# Patient Record
Sex: MALE | Race: WHITE | NOT HISPANIC OR LATINO | Employment: PART TIME | URBAN - METROPOLITAN AREA
[De-identification: names, ages, dates, MRNs, and addresses within clinical notes are randomized per-mention and may not be internally consistent; named-entity substitution may affect disease eponyms.]

---

## 2017-01-04 ENCOUNTER — GENERIC CONVERSION - ENCOUNTER (OUTPATIENT)
Dept: OTHER | Facility: OTHER | Age: 52
End: 2017-01-04

## 2017-02-16 ENCOUNTER — GENERIC CONVERSION - ENCOUNTER (OUTPATIENT)
Dept: OTHER | Facility: OTHER | Age: 52
End: 2017-02-16

## 2017-02-19 ENCOUNTER — ALLSCRIPTS OFFICE VISIT (OUTPATIENT)
Dept: OTHER | Facility: OTHER | Age: 52
End: 2017-02-19

## 2017-02-23 ENCOUNTER — ALLSCRIPTS OFFICE VISIT (OUTPATIENT)
Dept: OTHER | Facility: OTHER | Age: 52
End: 2017-02-23

## 2017-03-22 ENCOUNTER — TRANSCRIBE ORDERS (OUTPATIENT)
Dept: ADMINISTRATIVE | Facility: HOSPITAL | Age: 52
End: 2017-03-22

## 2017-03-22 ENCOUNTER — HOSPITAL ENCOUNTER (OUTPATIENT)
Dept: RADIOLOGY | Facility: MEDICAL CENTER | Age: 52
Discharge: HOME | End: 2017-03-22
Payer: COMMERCIAL

## 2017-03-22 DIAGNOSIS — R06.09 OTHER FORMS OF DYSPNEA: ICD-10-CM

## 2017-03-22 PROCEDURE — 71020 HB CHEST X-RAY 2VW FRONTAL&LATL: CPT

## 2017-03-24 ENCOUNTER — ALLSCRIPTS OFFICE VISIT (OUTPATIENT)
Dept: OTHER | Facility: OTHER | Age: 52
End: 2017-03-24

## 2017-03-24 ENCOUNTER — GENERIC CONVERSION - ENCOUNTER (OUTPATIENT)
Dept: OTHER | Facility: OTHER | Age: 52
End: 2017-03-24

## 2017-07-17 ENCOUNTER — ALLSCRIPTS OFFICE VISIT (OUTPATIENT)
Dept: OTHER | Facility: OTHER | Age: 52
End: 2017-07-17

## 2017-07-26 ENCOUNTER — ALLSCRIPTS OFFICE VISIT (OUTPATIENT)
Dept: OTHER | Facility: OTHER | Age: 52
End: 2017-07-26

## 2017-08-09 ENCOUNTER — GENERIC CONVERSION - ENCOUNTER (OUTPATIENT)
Dept: OTHER | Facility: OTHER | Age: 52
End: 2017-08-09

## 2017-08-09 ENCOUNTER — ALLSCRIPTS OFFICE VISIT (OUTPATIENT)
Dept: OTHER | Facility: OTHER | Age: 52
End: 2017-08-09

## 2017-10-27 DIAGNOSIS — Z12.11 ENCOUNTER FOR SCREENING FOR MALIGNANT NEOPLASM OF COLON: ICD-10-CM

## 2017-10-27 DIAGNOSIS — Z12.5 ENCOUNTER FOR SCREENING FOR MALIGNANT NEOPLASM OF PROSTATE: ICD-10-CM

## 2017-10-27 DIAGNOSIS — Z11.59 ENCOUNTER FOR SCREENING FOR OTHER VIRAL DISEASES (CODE): ICD-10-CM

## 2017-10-27 DIAGNOSIS — Z13.1 ENCOUNTER FOR SCREENING FOR DIABETES MELLITUS: ICD-10-CM

## 2017-10-27 DIAGNOSIS — Z13.220 ENCOUNTER FOR SCREENING FOR LIPOID DISORDERS: ICD-10-CM

## 2017-10-27 DIAGNOSIS — Z13.29 ENCOUNTER FOR SCREENING FOR OTHER SUSPECTED ENDOCRINE DISORDER: ICD-10-CM

## 2017-10-27 DIAGNOSIS — J45.990 EXERCISE-INDUCED BRONCHOSPASM: ICD-10-CM

## 2017-10-27 DIAGNOSIS — Z13.0 ENCOUNTER FOR SCREENING FOR DISEASES OF THE BLOOD AND BLOOD-FORMING ORGANS AND CERTAIN DISORDERS INVOLVING THE IMMUNE MECHANISM: ICD-10-CM

## 2017-11-06 ENCOUNTER — TRANSCRIBE ORDERS (OUTPATIENT)
Dept: ADMINISTRATIVE | Facility: HOSPITAL | Age: 52
End: 2017-11-06

## 2017-11-06 ENCOUNTER — APPOINTMENT (OUTPATIENT)
Dept: LAB | Facility: MEDICAL CENTER | Age: 52
End: 2017-11-06
Payer: COMMERCIAL

## 2017-11-06 DIAGNOSIS — J40 BRONCHITIS: ICD-10-CM

## 2017-11-06 DIAGNOSIS — R06.02 SOB (SHORTNESS OF BREATH): ICD-10-CM

## 2017-11-06 DIAGNOSIS — Z12.11 ENCOUNTER FOR SCREENING FOR MALIGNANT NEOPLASM OF COLON: ICD-10-CM

## 2017-11-06 DIAGNOSIS — Z11.59 ENCOUNTER FOR SCREENING FOR OTHER VIRAL DISEASES (CODE): ICD-10-CM

## 2017-11-06 DIAGNOSIS — Z12.5 ENCOUNTER FOR SCREENING FOR MALIGNANT NEOPLASM OF PROSTATE: ICD-10-CM

## 2017-11-06 DIAGNOSIS — J45.990 EXERCISE-INDUCED BRONCHOSPASM: ICD-10-CM

## 2017-11-06 DIAGNOSIS — R05.9 COUGH: ICD-10-CM

## 2017-11-06 DIAGNOSIS — R05.9 COUGH: Primary | ICD-10-CM

## 2017-11-06 DIAGNOSIS — Z13.29 ENCOUNTER FOR SCREENING FOR OTHER SUSPECTED ENDOCRINE DISORDER: ICD-10-CM

## 2017-11-06 DIAGNOSIS — Z13.1 ENCOUNTER FOR SCREENING FOR DIABETES MELLITUS: ICD-10-CM

## 2017-11-06 DIAGNOSIS — Z13.0 ENCOUNTER FOR SCREENING FOR DISEASES OF THE BLOOD AND BLOOD-FORMING ORGANS AND CERTAIN DISORDERS INVOLVING THE IMMUNE MECHANISM: ICD-10-CM

## 2017-11-06 DIAGNOSIS — Z13.220 ENCOUNTER FOR SCREENING FOR LIPOID DISORDERS: ICD-10-CM

## 2017-11-06 LAB
ALBUMIN SERPL BCP-MCNC: 4.3 G/DL (ref 3.5–5)
ALP SERPL-CCNC: 92 U/L (ref 46–116)
ALT SERPL W P-5'-P-CCNC: 48 U/L (ref 12–78)
ANION GAP SERPL CALCULATED.3IONS-SCNC: 5 MMOL/L (ref 4–13)
AST SERPL W P-5'-P-CCNC: 24 U/L (ref 5–45)
BASOPHILS # BLD AUTO: 0.03 THOUSANDS/ΜL (ref 0–0.1)
BASOPHILS NFR BLD AUTO: 1 % (ref 0–1)
BILIRUB SERPL-MCNC: 0.64 MG/DL (ref 0.2–1)
BUN SERPL-MCNC: 13 MG/DL (ref 5–25)
CALCIUM SERPL-MCNC: 9.4 MG/DL (ref 8.3–10.1)
CHLORIDE SERPL-SCNC: 103 MMOL/L (ref 100–108)
CHOLEST SERPL-MCNC: 189 MG/DL (ref 50–200)
CO2 SERPL-SCNC: 30 MMOL/L (ref 21–32)
CREAT SERPL-MCNC: 1.11 MG/DL (ref 0.6–1.3)
EOSINOPHIL # BLD AUTO: 0.22 THOUSAND/ΜL (ref 0–0.61)
EOSINOPHIL NFR BLD AUTO: 4 % (ref 0–6)
ERYTHROCYTE [DISTWIDTH] IN BLOOD BY AUTOMATED COUNT: 12.6 % (ref 11.6–15.1)
GFR SERPL CREATININE-BSD FRML MDRD: 76 ML/MIN/1.73SQ M
GLUCOSE P FAST SERPL-MCNC: 101 MG/DL (ref 65–99)
HCT VFR BLD AUTO: 48.8 % (ref 36.5–49.3)
HDLC SERPL-MCNC: 60 MG/DL (ref 40–60)
HGB BLD-MCNC: 17.1 G/DL (ref 12–17)
IGA SERPL-MCNC: 324 MG/DL (ref 70–400)
IGG SERPL-MCNC: 957 MG/DL (ref 700–1600)
IGM SERPL-MCNC: 102 MG/DL (ref 40–230)
LDLC SERPL CALC-MCNC: 112 MG/DL (ref 0–100)
LYMPHOCYTES # BLD AUTO: 1.57 THOUSANDS/ΜL (ref 0.6–4.47)
LYMPHOCYTES NFR BLD AUTO: 28 % (ref 14–44)
MCH RBC QN AUTO: 32 PG (ref 26.8–34.3)
MCHC RBC AUTO-ENTMCNC: 35 G/DL (ref 31.4–37.4)
MCV RBC AUTO: 91 FL (ref 82–98)
MONOCYTES # BLD AUTO: 0.61 THOUSAND/ΜL (ref 0.17–1.22)
MONOCYTES NFR BLD AUTO: 11 % (ref 4–12)
NEUTROPHILS # BLD AUTO: 3.21 THOUSANDS/ΜL (ref 1.85–7.62)
NEUTS SEG NFR BLD AUTO: 56 % (ref 43–75)
NRBC BLD AUTO-RTO: 0 /100 WBCS
PLATELET # BLD AUTO: 212 THOUSANDS/UL (ref 149–390)
PMV BLD AUTO: 10.1 FL (ref 8.9–12.7)
POTASSIUM SERPL-SCNC: 4.5 MMOL/L (ref 3.5–5.3)
PROT SERPL-MCNC: 8 G/DL (ref 6.4–8.2)
RBC # BLD AUTO: 5.34 MILLION/UL (ref 3.88–5.62)
SODIUM SERPL-SCNC: 138 MMOL/L (ref 136–145)
TRIGL SERPL-MCNC: 83 MG/DL
TSH SERPL DL<=0.05 MIU/L-ACNC: 3.55 UIU/ML (ref 0.36–3.74)
WBC # BLD AUTO: 5.65 THOUSAND/UL (ref 4.31–10.16)

## 2017-11-06 PROCEDURE — 86803 HEPATITIS C AB TEST: CPT

## 2017-11-06 PROCEDURE — 80061 LIPID PANEL: CPT

## 2017-11-06 PROCEDURE — 85025 COMPLETE CBC W/AUTO DIFF WBC: CPT

## 2017-11-06 PROCEDURE — 80053 COMPREHEN METABOLIC PANEL: CPT

## 2017-11-06 PROCEDURE — 82784 ASSAY IGA/IGD/IGG/IGM EACH: CPT

## 2017-11-06 PROCEDURE — 36415 COLL VENOUS BLD VENIPUNCTURE: CPT

## 2017-11-06 PROCEDURE — 84443 ASSAY THYROID STIM HORMONE: CPT

## 2017-11-07 LAB — HCV AB SER QL: NORMAL

## 2017-11-15 ENCOUNTER — ALLSCRIPTS OFFICE VISIT (OUTPATIENT)
Dept: OTHER | Facility: OTHER | Age: 52
End: 2017-11-15

## 2017-11-22 ENCOUNTER — GENERIC CONVERSION - ENCOUNTER (OUTPATIENT)
Dept: OTHER | Facility: OTHER | Age: 52
End: 2017-11-22

## 2017-12-15 ENCOUNTER — GENERIC CONVERSION - ENCOUNTER (OUTPATIENT)
Dept: FAMILY MEDICINE CLINIC | Facility: CLINIC | Age: 52
End: 2017-12-15

## 2018-01-10 NOTE — RESULT NOTES
Verified Results  (1) TESTOSTERONE, FREE (DIRECT) AND TOTAL [id number] 12:00AM Chidi Sanchez     Test Name Result Flag Reference   Testosterone, Total, LC/ 2 ng/dL  348 0-1197 0   Free Testosterone(Direct) 10 4 pg/mL  7 2-24 0

## 2018-01-11 NOTE — PROGRESS NOTES
Assessment    1  Encounter for preventive health examination (V70 0) (Z00 00)    Discussion/Summary  health maintenance visit eats an adequate diet Currently, he has an adequate exercise regimen  Prostate cancer screening: prostate cancer screening is current  Testicular cancer screening: monthly self testicular exam was advised  Colorectal cancer screening: colorectal cancer screening is current  The immunizations are up to date  Advice and education were given regarding nutrition, cardiovascular risk reduction, weight bearing exercise and weight loss  Patient discussion: discussed with the patient  The patient agrees to Hepatitis C screening  Patient's physical exam is fairly unremarkable  He is struggling a bit with his shortness of breath which is unpredictable and intermittent  He is using his inhalers as recommended by his pulmonologist and will be returning there shortly to review the results of his pulmonary function testing  I reviewed with him his routine blood work today  His total cholesterol was good at 189 with an LDL of 112  Interestingly his HDL has risen significantly over the last 6 months likely due to his significant increase in exercise during that time  The rest of his blood work is unremarkable  Will see him again in 6 months for routine follow-up  Chief Complaint  Patient presents for annual physical  Patient also has a form to get filled out  History of Present Illness  , Adult Male: The patient is being seen for a health maintenance evaluation  General Health: The patient's health since the last visit is described as good  He has regular dental visits  He complains of vision problems  Vision care includes wearing glasses  He denies hearing loss  Lifestyle:  He consumes a diverse and healthy diet  He has weight concerns  He exercises regularly  He does not use tobacco  He consumes alcohol  He reports occasional alcohol use     Reproductive health:  the patient is sexually active  Screening: cancer screening reviewed and current  metabolic screening reviewed and current  risk screening reviewed and current  HPI: Patient presents for annual physical exam  He feels overall that he is struggling with his health a bit currently  He has a problem with chronic cough chest congestion with shortness of breath however cardiac workup and pulmonary workup have been somewhat unremarkable  He continues to see Pulmonary Medicine and has an upcoming appointment to review some recent testing and pulmonary function evaluation  He also has some chronic gastrointestinal issues since his bouts of diverticulitis with a partial colon resection  He is struggling to lose weight despite the fact that he is dieting and working out regularly at the gym with a   Review of Systems    Constitutional: feeling tired  Eyes: No complaints of eye pain, no red eyes, no discharge from eyes, no itchy eyes  ENT: no complaints of earache, no hearing loss, no nosebleeds, no nasal discharge, no sore throat, no hoarseness  Cardiovascular: No complaints of slow heart rate, no fast heart rate, no chest pain, no palpitations, no leg claudication, no lower extremity  Respiratory: cough and shortness of breath during exertion  Genitourinary: No complaints of dysuria, no incontinence, no hesitancy, no nocturia, no genital lesion, no testicular pain  Musculoskeletal: No complaints of arthralgia, no myalgias, no joint swelling or stiffness, no limb pain or swelling  Integumentary: No complaints of skin rash or skin lesions, no itching, no skin wound, no dry skin  Neurological: No compliants of headache, no confusion, no convulsions, no numbness or tingling, no dizziness or fainting, no limb weakness, no difficulty walking  Psychiatric: Is not suicidal, no sleep disturbances, no anxiety or depression, no change in personality, no emotional problems     Endocrine: No complaints of proptosis, no hot flashes, no muscle weakness, no erectile dysfunction, no deepening of the voice, no feelings of weakness  Hematologic/Lymphatic: No complaints of swollen glands, no swollen glands in the neck, does not bleed easily, no easy bruising  Over the past 2 weeks, how often have you been bothered by the following problems? 1 ) Little interest or pleasure in doing things? Not at all    2 ) Feeling down, depressed or hopeless? Not at all    3 ) Trouble falling asleep or sleeping too much? Several days  4 ) Feeling tired or having little energy? Several days  5 ) Poor appetite or overeating? Not at all    6 ) Feeling bad about yourself, or that you are a failure, or have let yourself or your family down? Not at all    7 ) Trouble concentrating on things, such as reading a newspaper or watching television? Not at all    8 ) Moving or speaking so slowly that other people could have noticed, or the opposite, moving or speaking faster than usual? Not at all  How difficult have these problems made it for you to do your work, take care of things at home, or get along with people? Not at all  Score 2      Active Problems    1  Acute bronchitis, unspecified organism (466 0) (J20 9)   2  Diverticulitis of colon (562 11) (K57 32)   3  Dyspnea on exertion (786 09) (R06 09)   4  Exercise-induced bronchospasm (493 81) (J45 990)   5  Herpes simplex type 1 infection (054 9) (B00 9)   6  Need for hepatitis C screening test (V73 89) (Z11 59)   7  Prostate cancer screening (V76 44) (Z12 5)   8  Screening for colon cancer (V76 51) (Z12 11)   9  Screening for deficiency anemia (V78 1) (Z13 0)   10  Screening for diabetes mellitus (DM) (V77 1) (Z13 1)   11  Screening for lipid disorders (V77 91) (Z13 220)   12   Screening for thyroid disorder (V77 0) (Z13 29)    Past Medical History    · History of Herpes infection (054 9) (B00 9)   · History of Plantar fasciitis (838 71) (M72 2)   · History of Proctitis (569 49) (K62 89)   · History of Rectal fissure (565 0) (K60 2)    Surgical History    · History of Elbow Surgery   · History of Hand Surgery   · History of Knee Arthroscopy (Therapeutic)   · History of Myringotomy   · History of Pyloromyotomy   · History of Septoplasty   · History of Septoplasty    Family History  Mother    · Family history of Breast Cancer (V16 3)  Father    · Family history of Angina Pectoris   · Family history of Reported Family History Of Heart Disease  Paternal Grandmother    · Family history of Brain Tumor  Maternal Grandfather    · Family history of Lung Cancer (V16 1)    Social History    · Never A Smoker    Current Meds   1  Acyclovir 5 % External Ointment; APPLY TO AFFECTED AREA UP TO 4 TIMES DAILY; Therapy: 90GEH3206 to (Last Rx:[id number])  Requested for: 61OMX5222 Ordered   2  Advair Diskus 500-50 MCG/DOSE Inhalation Aerosol Powder Breath Activated; Therapy: 76OYL3423 to Recorded   3  Albuterol Sulfate (2 5 MG/3ML) 0 083% Inhalation Nebulization Solution; USE 1 UNIT   DOSE EVERY 4-6 HOURS AS NEEDED FOR WHEEZING ; Therapy: 83WOX1112 to Recorded   4  Aspirin 81 MG CAPS; take 1 capsule daily; Therapy: (Recorded:24Mar2017) to Recorded   5  Fish Oil 1200 MG Oral Capsule; take 1 capsule daily; Therapy: (Recorded:24Mar2017) to Recorded   6  ProAir  (90 Base) MCG/ACT Inhalation Aerosol Solution; INHALE 2 PUFFS   Every 6 hours PRN; Therapy: 72EQB7294 to (Last Rx:[id number])  Requested for: 15IUH9458 Ordered   7  Singulair 10 MG Oral Tablet; take 1 tablet by mouth every day; Therapy: 93WHE5542 to Recorded   8  Vitamin D3 1000 UNIT Oral Tablet; TAKE 1 TABLET DAILY; Therapy: (Recorded:24Mar2017) to Recorded    Allergies    1   No Known Drug Allergies    Vitals   Recorded: 61NTF1585 01:38PM   Temperature 99 9 F, Tympanic   Heart Rate 104   Pulse Quality Normal   Respiration Quality Normal   Respiration 16   Systolic 662, LUE, Sitting   Diastolic 88, LUE, Sitting   Height 6 ft 3 in Weight 239 lb 6 oz   BMI Calculated 29 92   BSA Calculated 2 37   O2 Saturation 97, RA   Pain Scale 0     Physical Exam    Constitutional   General appearance: No acute distress, well appearing and well nourished  Head and Face   Head and face: Normal     Eyes   Conjunctiva and lids: No erythema, swelling or discharge  Pupils and irises: Equal, round, reactive to light  Ears, Nose, Mouth, and Throat   External inspection of ears and nose: Normal     Otoscopic examination: Tympanic membranes translucent with normal light reflex  Canals patent without erythema  Hearing: Normal     Nasal mucosa, septum, and turbinates: Normal without edema or erythema  Lips, teeth, and gums: Normal, good dentition  Oropharynx: Normal with no erythema, edema, exudate or lesions  Neck   Neck: Supple, symmetric, trachea midline, no masses  Thyroid: Normal, no thyromegaly  Pulmonary   Respiratory effort: No increased work of breathing or signs of respiratory distress  Auscultation of lungs: Clear to auscultation  Cardiovascular   Auscultation of heart: Normal rate and rhythm, normal S1 and S2, no murmurs  Carotid pulses: 2+ bilaterally  Femoral pulses: 2+ bilaterally  Examination of extremities for edema and/or varicosities: Normal     Abdomen   Abdomen: Non-tender, no masses  Liver and spleen: No hepatomegaly or splenomegaly  Lymphatic   Palpation of lymph nodes in neck: No lymphadenopathy  Musculoskeletal   Gait and station: Normal     Inspection/palpation of digits and nails: Normal without clubbing or cyanosis  Inspection/palpation of joints, bones, and muscles: Normal     Range of motion: Normal     Stability: Normal     Muscle strength/tone: Normal     Skin   Skin and subcutaneous tissue: Normal without rashes or lesions  Neurologic   Cranial nerves: Cranial nerves 2-12 intact  Cortical function: Normal mental status  Reflexes: 2+ and symmetric      Sensation: No sensory loss     Coordination: Normal finger to nose and heel to shin  Psychiatric   Judgment and insight: Normal     Orientation to person, place and time: Normal     Recent and remote memory: Intact  Mood and affect: Normal        Health Management  History of Encounter for screening colonoscopy   COLONOSCOPY; every 5 years; Last 89ONZ9805; Next Due: 15FJD0005;  Active    Signatures   Electronically signed by : Maria De Jesus St DO; Nov 15 2017  4:26PM EST                       (Author)

## 2018-01-12 VITALS
DIASTOLIC BLOOD PRESSURE: 84 MMHG | HEART RATE: 78 BPM | SYSTOLIC BLOOD PRESSURE: 130 MMHG | WEIGHT: 237.38 LBS | BODY MASS INDEX: 29.51 KG/M2 | RESPIRATION RATE: 16 BRPM | TEMPERATURE: 97.9 F | HEIGHT: 75 IN | OXYGEN SATURATION: 98 %

## 2018-01-12 VITALS
WEIGHT: 242 LBS | HEART RATE: 84 BPM | SYSTOLIC BLOOD PRESSURE: 136 MMHG | DIASTOLIC BLOOD PRESSURE: 84 MMHG | TEMPERATURE: 98.8 F | HEIGHT: 75 IN | BODY MASS INDEX: 30.09 KG/M2 | OXYGEN SATURATION: 98 % | RESPIRATION RATE: 18 BRPM

## 2018-01-13 VITALS
SYSTOLIC BLOOD PRESSURE: 112 MMHG | HEART RATE: 108 BPM | OXYGEN SATURATION: 96 % | TEMPERATURE: 98.5 F | BODY MASS INDEX: 29.65 KG/M2 | RESPIRATION RATE: 18 BRPM | HEIGHT: 75 IN | DIASTOLIC BLOOD PRESSURE: 78 MMHG | WEIGHT: 238.5 LBS

## 2018-01-13 VITALS
TEMPERATURE: 99.9 F | DIASTOLIC BLOOD PRESSURE: 88 MMHG | HEIGHT: 75 IN | WEIGHT: 239.38 LBS | HEART RATE: 104 BPM | BODY MASS INDEX: 29.76 KG/M2 | OXYGEN SATURATION: 97 % | RESPIRATION RATE: 16 BRPM | SYSTOLIC BLOOD PRESSURE: 118 MMHG

## 2018-01-14 VITALS
OXYGEN SATURATION: 98 % | WEIGHT: 243.31 LBS | RESPIRATION RATE: 16 BRPM | TEMPERATURE: 98.1 F | SYSTOLIC BLOOD PRESSURE: 138 MMHG | HEART RATE: 78 BPM | DIASTOLIC BLOOD PRESSURE: 98 MMHG | HEIGHT: 75 IN | BODY MASS INDEX: 30.25 KG/M2

## 2018-01-14 NOTE — RESULT NOTES
Verified Results  * XR CHEST PA & LATERAL [id number] 05:05PM [place] Order Number: [id number]     Test Name Result Flag Reference   XR CHEST PA & LATERAL (Report)     CHEST 2 View     INDICATION: Bronchitis 3 months ago with shortness of breath  COMPARISON: None     VIEWS: PA and lateral projections; 2 images     FINDINGS:        Cardiomediastinal silhouette appears unremarkable  The lungs are clear  No pneumothorax or pleural effusion  Visualized osseous structures appear within normal limits for the patient's age  IMPRESSION:     No active pulmonary disease  Workstation performed: [id number]     Signed by:    Rony Acosta MD   3/24/17

## 2018-01-15 VITALS
OXYGEN SATURATION: 99 % | HEART RATE: 95 BPM | DIASTOLIC BLOOD PRESSURE: 78 MMHG | TEMPERATURE: 97.7 F | SYSTOLIC BLOOD PRESSURE: 100 MMHG

## 2018-01-17 NOTE — PROGRESS NOTES
Assessment    1  Encounter for preventive health examination (V70 0) (Z00 00)   2  Abdominal pain (789 00) (R10 9)   3  Flu vaccine need (V04 81) (Z23)   4  Need for Tdap vaccination (V06 1) (Z23)    Plan  Abdominal pain    · 2 - Flaquito MULLEN, Corina Roberts  (Gastroenterology) Physician Referral  Consult Only: the  expectation is that the referring provider will communicate back to the patient on  treatment options  Evaluation and Treatment: the expectation is that the referred to  provider will communicate back to the patient on treatment options  Status: Active   Requested for: [id number]  Care Summary provided  : Yes  Flu vaccine need    · Fluzone Quadrivalent Intramuscular Suspension  Need for Tdap vaccination    · Adacel 5-2-15 5 LF-MCG/0 5 Intramuscular Suspension    Discussion/Summary  Impression: health maintenance visit  Currently, he eats an adequate diet and has an adequate exercise regimen  Prostate cancer screening: prostate cancer screening is current  Testicular cancer screening: monthly self testicular exam was advised  Colorectal cancer screening: colorectal cancer screening is current  The risks and benefits of immunizations were discussed, immunizations are needed and immunizations will be given as outlined in the orders  Advice and education were given regarding nutrition, aerobic exercise and weight bearing exercise  Patient discussion: discussed with the patient  Patient has a relatively normal physical exam  He does have GI complaints for which the etiology is unclear at this time  We will refer him to GI for further evaluation  He is due for flu vaccine and an Adacel which we'll give today  We reviewed all of his recent blood work  His PSA is normal  Hemoglobin A1c is 5 1 total cholesterol is 173 and LDL is 105 and HDL has risen to 43  Chief Complaint  Pt presents for annual physical and Flu inj today  Pt has no concern at this time  History of Present Illness  , Adult Male:  The patient is being seen for a health maintenance evaluation  General Health: The patient's health since the last visit is described as good  He has regular dental visits  He denies vision problems  He denies hearing loss  Immunizations status: not up to date The patient needs the following immunization(s): tetanus vaccine and influenza vaccine  Lifestyle:  He consumes a diverse and healthy diet  He has weight concerns  He exercises regularly  He does not use tobacco  He consumes alcohol  He denies drug use  Screening: cancer screening reviewed and updated  metabolic screening reviewed and updated  risk screening reviewed and updated  HPI: Patient presents for routine annual wellness physical  His only concerns at this time are related to gastrointestinal issues  Over the last several years he has had GI surgeries for diverticulitis and colon resection  Since then he has intermittent symptoms of gas bloating and abdominal discomfort which are sometimes triggered are made worse by certain foods or exercise  His stool habits have changed since his surgery  He has tried changing his diet and changing his activity without any significant improvement in his symptoms  He does exercise regularly doing both cardio and resistance training  He drinks only moderate alcohol and he feels that his diet is fairly healthy  Review of Systems    Constitutional: feeling tired  Eyes: No complaints of eye pain, no red eyes, no discharge from eyes, no itchy eyes  ENT: no complaints of earache, no hearing loss, no nosebleeds, no nasal discharge, no sore throat, no hoarseness  Cardiovascular: No complaints of slow heart rate, no fast heart rate, no chest pain, no palpitations, no leg claudication, no lower extremity  Respiratory: No complaints of shortness of breath, no wheezing, no cough, no SOB on exertion, no orthopnea or PND  Gastrointestinal: as noted in HPI     Genitourinary: No complaints of dysuria, no incontinence, no hesitancy, no nocturia, no genital lesion, no testicular pain  Musculoskeletal: No complaints of arthralgia, no myalgias, no joint swelling or stiffness, no limb pain or swelling  Integumentary: No complaints of skin rash or skin lesions, no itching, no skin wound, no dry skin  Neurological: No compliants of headache, no confusion, no convulsions, no numbness or tingling, no dizziness or fainting, no limb weakness, no difficulty walking  Psychiatric: Is not suicidal, no sleep disturbances, no anxiety or depression, no change in personality, no emotional problems  Endocrine: No complaints of proptosis, no hot flashes, no muscle weakness, no erectile dysfunction, no deepening of the voice, no feelings of weakness  Active Problems    1  Abdominal pain, RLQ (right lower quadrant) (789 03) (R10 31)   2  Cheilitis (528 5) (K13 0)   3  Diverticulitis of colon (562 11) (K57 32)   4  Dyspnea on exertion (786 09) (R06 09)   5  Fatigue (780 79) (R53 83)   6  Herpes infection (054 9) (B00 9)   7  Proctitis (569 49) (K62 89)   8  Rectal fissure (565 0) (K60 2)   9  Rectal pain (569 42) (K62 89)   10  Screening for diabetes mellitus (V77 1) (Z13 1)   11  Screening for lipid disorders (V77 91) (Z13 220)   12  Screening for prostate cancer (V76 44) (Z12 5)   13   Screening for thyroid disorder (V77 0) (Z13 29)    Past Medical History    · History of Abdominal pain (789 00) (R10 9)   · History of Encounter for screening colonoscopy (V76 51) (Z12 11)   · History of abdominal pain (V13 89) (T08 518)   · History of acute bronchitis (V12 69) (Z87 09)   · History of chest pain (V13 89) (R02 310)   · History of dizziness (V13 89) (J25 025)   · History of shortness of breath (V13 89) (L66 778)   · History of Need for influenza vaccination (V04 81) (Z23)   · History of Plantar fasciitis (728 71) (M72 2)   · History of Visit for pre-operative examination (V72 84) (O48 963)    Surgical History    · History of Elbow Surgery   · History of Hand Surgery   · History of Knee Arthroscopy   · History of Myringotomy   · History of Pyloromyotomy   · History of Septoplasty   · History of Septoplasty    Family History  Mother    · Family history of Breast Cancer (V16 3)  Father    · Family history of Angina Pectoris   · Family history of Reported Family History Of Heart Disease  Paternal Grandmother    · Family history of Brain Tumor  Maternal Grandfather    · Family history of Lung Cancer (V16 1)    Social History    · Never A Smoker    Current Meds   1  No Reported Medications Recorded    Allergies    1  No Known Drug Allergies    Vitals   Recorded: [id number] 09:09AM   Temperature 98 2 F, Tympanic   Heart Rate 103   Pulse Quality Norm   Respiration Quality Norm   Respiration 17   Systolic 871, LUE, Sitting   Diastolic 76, LUE, Sitting   Height 6 ft 3 in   Weight 235 lb 7 04 oz   BMI Calculated 29 43   BSA Calculated 2 36   O2 Saturation 95, RA   Pain Scale 0     Physical Exam    Constitutional   General appearance: No acute distress, well appearing and well nourished  Head and Face   Head and face: Normal     Eyes   Conjunctiva and lids: No erythema, swelling or discharge  Pupils and irises: Equal, round, reactive to light  Ears, Nose, Mouth, and Throat   External inspection of ears and nose: Normal     Otoscopic examination: Tympanic membranes translucent with normal light reflex  Canals patent without erythema  Hearing: Normal     Nasal mucosa, septum, and turbinates: Normal without edema or erythema  Lips, teeth, and gums: Normal, good dentition  Oropharynx: Normal with no erythema, edema, exudate or lesions  Neck   Neck: Supple, symmetric, trachea midline, no masses  Thyroid: Normal, no thyromegaly  Pulmonary   Respiratory effort: No increased work of breathing or signs of respiratory distress  Auscultation of lungs: Clear to auscultation      Cardiovascular   Auscultation of heart: Normal rate and rhythm, normal S1 and S2, no murmurs  Carotid pulses: 2+ bilaterally  Examination of extremities for edema and/or varicosities: Normal     Abdomen   Abdomen: Non-tender, no masses  Liver and spleen: No hepatomegaly or splenomegaly  Lymphatic   Palpation of lymph nodes in neck: No lymphadenopathy  Musculoskeletal   Gait and station: Normal     Inspection/palpation of digits and nails: Normal without clubbing or cyanosis  Inspection/palpation of joints, bones, and muscles: Normal     Range of motion: Normal     Stability: Normal     Muscle strength/tone: Normal     Neurologic   Cranial nerves: Cranial nerves 2-12 intact  Reflexes: 2+ and symmetric  Coordination: Normal finger to nose and heel to shin  Psychiatric   Judgment and insight: Normal     Orientation to person, place and time: Normal     Recent and remote memory: Intact  Mood and affect: Normal        Results/Data  (1) COMPREHENSIVE METABOLIC PANEL [id number] 05:52TD [facility] Order Number: [id number]     Test Name Result Flag Reference   GLUCOSE,RANDM 93 mg/dL     If the patient is fasting, the ADA then defines impaired fasting glucose as > 100 mg/dL and diabetes as > or equal to 123 mg/dL  SODIUM 137 mmol/L  136-145   POTASSIUM 4 3 mmol/L  3 5-5 3   CHLORIDE 103 mmol/L  100-108   CARBON DIOXIDE 29 mmol/L  21-32   ANION GAP (CALC) 5 mmol/L  4-13   BLOOD UREA NITROGEN 16 mg/dL  5-25   CREATININE 1 05 mg/dL  0 60-1 30   Standardized to IDMS reference method   CALCIUM 9 3 mg/dL  8 3-10 1   BILI, TOTAL 0 82 mg/dL  0 20-1 00   ALK PHOSPHATAS 94 U/L     ALT (SGPT) 37 U/L  12-78   AST(SGOT) 15 U/L  5-45   ALBUMIN 4 5 g/dL  3 5-5 0   TOTAL PROTEIN 7 7 g/dL  6 4-8 2   eGFR Non-African American      >60 0 ml/min/1 73sq m   - Patient Instructions:  This is a fasting blood test  Water, black tea or black coffee only after 9:00pm the night before test Drink 2 glasses of water the morning of test - Patient Instructions: This bloodwork is non-fasting  Please drink two glasses of   water morning of bloodwork  National Kidney Disease Education Program recommendations are as follows:  GFR calculation is accurate only with a steady state creatinine  Chronic Kidney disease less than 60 ml/min/1 73 sq  meters  Kidney failure less than 15 ml/min/1 73 sq  meters  (1) HEMOGLOBIN A1C [id number] 10:51AM [facility] Order Number: [id number]     Test Name Result Flag Reference   HEMOGLOBIN A1C 5 1 %  4 2-6 3   EST  AVG  GLUCOSE 100 mg/dl       (1) LIPID PANEL FASTING W DIRECT LDL REFLEX [id number] 10:51AM Bry[facility] Order Number: [id number]     Test Name Result Flag Reference   CHOLESTEROL 173 mg/dL     LDL CHOLESTEROL CALCULATED 105 mg/dL H 0-100   - Patient Instructions: This is a fasting blood test  Water, black tea or black coffee only after 9:00pm the night before test   Drink 2 glasses of water the morning of test     - Patient Instructions: This is a fasting blood test  Water, black tea or black coffee only after 9:00pm the night before test Drink 2 glasses of water the morning of test - Patient Instructions: This bloodwork is non-fasting  Please drink two glasses of   water morning of bloodwork  Triglyceride:         Normal              <150 mg/dl       Borderline High    150-199 mg/dl       High               200-499 mg/dl       Very High          >499 mg/dl  Cholesterol:         Desirable        <200 mg/dl      Borderline High  200-239 mg/dl      High             >239 mg/dl  HDL Cholesterol:        High    >59 mg/dL      Low     <41 mg/dL  LDL Cholesterol:        Optimal          <100 mg/dl        Near Optimal     100-129 mg/dl        Above Optimal          Borderline High   130-159 mg/dl          High              160-189 mg/dl          Very High        >189 mg/dl  LDL CALCULATED:    This screening LDL is a calculated result    It does not have the accuracy of the Direct Measured LDL in the monitoring of patients with hyperlipidemia and/or statin therapy  Direct Measure LDL (XTI967) must be ordered separately in these patients  TRIGLYCERIDES 127 mg/dL  <=150   Specimen collection should occur prior to N-Acetylcysteine or Metamizole administration due to the potential for falsely depressed results  HDL,DIRECT 43 mg/dL  40-60   Specimen collection should occur prior to Metamizole administration due to the potential for falsely depressed results  (1) PSA (SCREEN) (Dx V76 44 Screen for Prostate Cancer) [id number] 10:51AM [facility] Order Number: [id number]     Test Name Result Flag Reference   PROSTATE SPECIFIC ANTIGEN 0 4 ng/mL  0 0-4 0   American Urological Association Guidelines define biochemical recurrence of prostate cancer as a detectable or rising PSA value post-radical prostatectomy that is greater than or equal to 0 2 ng/mL with a second confirmatory level of greater than or equal to 0 2 ng/mL  - Patient Instructions: This test is non-fasting  Please drink two glasses of water morning of bloodwork  - Patient Instructions: This test is non-fasting  Please drink two glasses of water morning of bloodwork  (1) TSH [id number] 10:51AM [facility] Order Number: [id number]     Test Name Result Flag Reference   TSH 3 330 uIU/mL  0 358-3 740   - Patient Instructions: This bloodwork is non-fasting  Please drink two glasses of water morning of bloodwork  - Patient Instructions: This is a fasting blood test  Water, black tea or black coffee only after 9:00pm the night before test Drink 2 glasses of water the morning of test - Patient Instructions: This bloodwork is non-fasting  Please drink two glasses of   water morning of bloodwork  Patients undergoing fluorescein dye angiography may retain small amounts of fluorescein in the body for 48-72 hours post procedure   Samples containing fluorescein can produce falsely depressed TSH values  If the patient had this procedure,a specimen should be resubmitted post fluorescein clearance  Health Management  History of Encounter for screening colonoscopy   COLONOSCOPY; every 5 years; Last [id number]; Next Due: 43WQE8982;  Active    Signatures   Electronically signed by : Kat Giles DO; Dec 16 2016 10:33AM EST                       (Author)

## 2018-01-17 NOTE — RESULT NOTES
Verified Results  (1) CBC/PLT/DIFF [id number] 12:00AM Mellen Ray     Test Name Result Flag Reference   WBC 5 2 x10E3/uL  3 4-10 8   RBC 5 55 x10E6/uL  4 14-5 80   Hemoglobin 17 3 g/dL  12 6-17 7   Hematocrit 50 1 %  37 5-51 0   MCV 90 fL  79-97   MCH 31 2 pg  26 6-33 0   MCHC 34 5 g/dL  31 5-35 7   RDW 13 1 %  12 3-15 4   Platelets 110 R85U8/AX  150-379   Neutrophils 52 %     Lymphs 27 %     Monocytes 15 %     Eos 5 %     Basos 1 %     Neutrophils (Absolute) 2 6 x10E3/uL  1 4-7 0   Lymphs (Absolute) 1 4 x10E3/uL  0 7-3 1   Monocytes(Absolute) 0 8 x10E3/uL  0 1-0 9   Eos (Absolute) 0 3 x10E3/uL  0 0-0 4   Baso (Absolute) 0 1 x10E3/uL  0 0-0 2   Immature Granulocytes 0 %     Immature Grans (Abs) 0 0 x10E3/uL  0 0-0 1     (1) COMPREHENSIVE METABOLIC PANEL [id number] 48:52JY Mellen Ray     Test Name Result Flag Reference   Glucose, Serum 90 mg/dL  65-99   BUN 20 mg/dL  6-24   Creatinine, Serum 0 97 mg/dL  0 76-1 27   eGFR If NonAfricn Am 90 mL/min/1 73  >59   eGFR If Africn Am 104 mL/min/1 73  >59   BUN/Creatinine Ratio 21 H 9-20   Sodium, Serum 138 mmol/L  134-144   Potassium, Serum 4 3 mmol/L  3 5-5 2   Chloride, Serum 98 mmol/L     Carbon Dioxide, Total 23 mmol/L  18-29   Calcium, Serum 9 2 mg/dL  8 7-10 2   Protein, Total, Serum 7 0 g/dL  6 0-8 5   Albumin, Serum 4 6 g/dL  3 5-5 5   Globulin, Total 2 4 g/dL  1 5-4 5   A/G Ratio 1 9  1 1-2 5   Bilirubin, Total 0 3 mg/dL  0 0-1 2   Alkaline Phosphatase, S 96 IU/L     AST (SGOT) 19 IU/L  0-40   ALT (SGPT) 29 IU/L  0-44     (1) TSH [id number] 12:00AM Marla Ray     Test Name Result Flag Reference   TSH 3 670 uIU/mL  0 450-4 500     (1) VITAMIN D 25-HYDROXY [id number] 12:00AM Marla Ray     Test Name Result Flag Reference   Vitamin D, 25-Hydroxy 20 8 ng/mL L 30 0-100 0   Vitamin D deficiency has been defined by the [place] of  Medicine and an Endocrine Society practice guideline as a  level of serum 25-OH vitamin D less than 20 ng/mL (1,2)   The Endocrine Society went on to further define vitamin D  insufficiency as a level between 21 and 29 ng/mL (2)  1  IOM ([place] of Medicine)  2010  Dietary reference     intakes for calcium and D  [facility]: The     Scrip-t  2  Luigi MF, Benoit DEY, Surendra MILLER, et al      Evaluation, treatment, and prevention of vitamin D     deficiency: an Endocrine Society clinical practice     guideline  [facility]  2011 Jul; 96(7):1911-30  [facility] [id number] 12:00AM [employer] [facility]     Test Name Result Flag Reference   Cholesterol, Total 199 mg/dL  100-199   Triglycerides 250 mg/dL H 0-149   HDL Cholesterol 37 mg/dL L >39   According to ATP-III Guidelines, HDL-C >59 mg/dL is considered a  negative risk factor for CHD  VLDL Cholesterol Cesar 50 mg/dL H 5-40   LDL Cholesterol Calc 112 mg/dL H 0-99     (1) PSA (SCREEN) (Dx V76 44 Screen for Prostate Cancer) [id number] 12:00AM [employer] [facility]     Test Name Result Flag Reference   Prostate Specific Ag, Serum 0 4 ng/mL  0 0-4 0   Roche ECLIA methodology  According to the American Urological Association, Serum PSA should  decrease and remain at undetectable levels after radical  prostatectomy  The AUA defines biochemical recurrence as an initial  PSA value 0 2 ng/mL or greater followed by a subsequent confirmatory  PSA value 0 2 ng/mL or greater  Values obtained with different assay methods or kits cannot be used  interchangeably  Results cannot be interpreted as absolute evidence  of the presence or absence of malignant disease

## 2018-01-22 VITALS
HEART RATE: 78 BPM | HEIGHT: 75 IN | WEIGHT: 235 LBS | BODY MASS INDEX: 29.22 KG/M2 | DIASTOLIC BLOOD PRESSURE: 84 MMHG | SYSTOLIC BLOOD PRESSURE: 120 MMHG

## 2018-01-22 VITALS
SYSTOLIC BLOOD PRESSURE: 130 MMHG | BODY MASS INDEX: 29.72 KG/M2 | HEART RATE: 69 BPM | DIASTOLIC BLOOD PRESSURE: 90 MMHG | HEIGHT: 75 IN | WEIGHT: 239 LBS

## 2018-01-31 RX ORDER — ACYCLOVIR 50 MG/G
OINTMENT TOPICAL 4 TIMES DAILY
COMMUNITY
Start: 2017-07-17 | End: 2020-11-09

## 2018-01-31 RX ORDER — BIOTIN 1 MG
1 TABLET ORAL DAILY
COMMUNITY

## 2018-01-31 RX ORDER — BUDESONIDE AND FORMOTEROL FUMARATE DIHYDRATE 160; 4.5 UG/1; UG/1
AEROSOL RESPIRATORY (INHALATION)
Refills: 3 | COMMUNITY
Start: 2017-12-06

## 2018-01-31 RX ORDER — MONTELUKAST SODIUM 10 MG/1
1 TABLET ORAL DAILY
COMMUNITY
Start: 2017-11-15 | End: 2020-11-09

## 2018-01-31 RX ORDER — AMOXICILLIN 500 MG
1 CAPSULE ORAL DAILY
COMMUNITY

## 2018-01-31 RX ORDER — ALBUTEROL SULFATE 2.5 MG/3ML
1 SOLUTION RESPIRATORY (INHALATION)
COMMUNITY
Start: 2017-11-15

## 2018-01-31 RX ORDER — ALBUTEROL SULFATE 90 UG/1
2 AEROSOL, METERED RESPIRATORY (INHALATION) EVERY 6 HOURS PRN
COMMUNITY
Start: 2017-02-19 | End: 2018-03-02 | Stop reason: SDUPTHER

## 2018-02-05 ENCOUNTER — OFFICE VISIT (OUTPATIENT)
Dept: FAMILY MEDICINE CLINIC | Facility: CLINIC | Age: 53
End: 2018-02-05

## 2018-02-05 VITALS
HEIGHT: 76 IN | SYSTOLIC BLOOD PRESSURE: 110 MMHG | BODY MASS INDEX: 29.96 KG/M2 | RESPIRATION RATE: 16 BRPM | HEART RATE: 72 BPM | WEIGHT: 246 LBS | DIASTOLIC BLOOD PRESSURE: 80 MMHG

## 2018-02-05 DIAGNOSIS — Z02.89 ENCOUNTER FOR FEDERAL AVIATION ADMINISTRATION (FAA) EXAMINATION: Primary | ICD-10-CM

## 2018-02-05 PROCEDURE — 99499FA: Performed by: FAMILY MEDICINE

## 2018-02-06 NOTE — PROGRESS NOTES
Chief Complaint   Patient presents with   [facility] Exam     [address] [place] [id number] [facility] Contacts

## 2018-03-01 PROBLEM — J45.990 EXERCISE-INDUCED BRONCHOSPASM: Status: ACTIVE | Noted: 2017-03-27

## 2018-03-01 PROBLEM — B00.9 HERPES SIMPLEX TYPE 1 INFECTION: Status: ACTIVE | Noted: 2017-07-17

## 2018-03-01 PROBLEM — R06.00 DYSPNEA ON EXERTION: Status: ACTIVE | Noted: 2017-02-23

## 2018-03-01 PROBLEM — R06.09 DYSPNEA ON EXERTION: Status: ACTIVE | Noted: 2017-02-23

## 2018-03-01 RX ORDER — DM/P-EPHED/ACETAMINOPH/DOXYLAM
2000 LIQUID (ML) ORAL
COMMUNITY
Start: 2014-07-25

## 2018-03-01 RX ORDER — UBIDECARENONE 100 MG
100 CAPSULE ORAL
COMMUNITY
Start: 2014-07-25

## 2018-03-02 ENCOUNTER — DOCUMENTATION (OUTPATIENT)
Dept: FAMILY MEDICINE CLINIC | Facility: CLINIC | Age: 53
End: 2018-03-02

## 2018-03-02 ENCOUNTER — OFFICE VISIT (OUTPATIENT)
Dept: FAMILY MEDICINE CLINIC | Facility: CLINIC | Age: 53
End: 2018-03-02
Payer: COMMERCIAL

## 2018-03-02 VITALS
BODY MASS INDEX: 32.23 KG/M2 | OXYGEN SATURATION: 97 % | HEIGHT: 73 IN | SYSTOLIC BLOOD PRESSURE: 122 MMHG | RESPIRATION RATE: 20 BRPM | WEIGHT: 243.2 LBS | DIASTOLIC BLOOD PRESSURE: 94 MMHG | HEART RATE: 83 BPM | TEMPERATURE: 97.7 F

## 2018-03-02 DIAGNOSIS — R10.84 GENERALIZED ABDOMINAL PAIN: Primary | ICD-10-CM

## 2018-03-02 DIAGNOSIS — K57.32 DIVERTICULITIS OF LARGE INTESTINE WITHOUT PERFORATION OR ABSCESS WITHOUT BLEEDING: ICD-10-CM

## 2018-03-02 PROCEDURE — 1036F TOBACCO NON-USER: CPT | Performed by: FAMILY MEDICINE

## 2018-03-02 PROCEDURE — 99214 OFFICE O/P EST MOD 30 MIN: CPT | Performed by: FAMILY MEDICINE

## 2018-03-02 NOTE — PROGRESS NOTES
Assessment/Plan:  30 minutes spent in review of history and discussion of alternatives    Generalized abdominal pain  Generalized intermittent abdominal pain in a patient with a history of prior abdominal surgery and diverticulitis  Pain does not seem to be acute or surgical in nature however given that it has been present and slightly worse over the last 6 weeks will check CT scan  Diagnoses and all orders for this visit:    Generalized abdominal pain  -     CT abdomen pelvis wo contrast; Future    Diverticulitis of large intestine without perforation or abscess without bleeding  -     CT abdomen pelvis wo contrast; Future          Subjective:      Patient ID: Marcel Connelly is a 48 y o  male  Patient presents with a chief complaint of abdominal pain  Patient has a history of chronic issues over the last several years with his abdomen and has had 2 surgeries related to diverticulitis  He relates that over the last few months and in particular over the last 6 weeks he has had recurrent bouts of rather sharp now gang or stabbing pain in his right lower quadrant left upper quadrant and in scattered areas of his abdomen  The pain generally comes in go was though in his right lower quadrant is almost constant  He has no fever chills  There has been no change from his current bowel habit  The following portions of the patient's history were reviewed and updated as appropriate: allergies, current medications, past family history, past medical history, past social history, past surgical history and problem list     Review of Systems   Constitutional: Negative  Respiratory: Negative  Cardiovascular: Negative  Gastrointestinal: Positive for abdominal pain  Negative for blood in stool, constipation and diarrhea  Genitourinary: Negative  Musculoskeletal: Negative  Psychiatric/Behavioral: Negative            Objective:      /94 (BP Location: Left arm, Patient Position: Sitting, Cuff Size: Large)   Pulse 83   Temp 97 7 °F (36 5 °C) (Tympanic)   Resp 20   Ht 6' 1 23" (1 86 m)   Wt 110 kg (243 lb 3 2 oz)   SpO2 97%   BMI 31 89 kg/m²          Physical Exam   Constitutional: He is oriented to person, place, and time  He appears well-developed and well-nourished  No distress  HENT:   Head: Normocephalic and atraumatic  Eyes: Conjunctivae are normal  Pupils are equal, round, and reactive to light  Right eye exhibits no discharge  Neck: Normal range of motion  No thyromegaly present  Cardiovascular: Normal rate and regular rhythm  Pulmonary/Chest: Effort normal and breath sounds normal  No respiratory distress  Abdominal: Soft  Bowel sounds are normal  He exhibits no distension and no mass  There is tenderness  There is no rebound and no guarding  Mild generalized tenderness  Slightly worse in right lower quadrant  No guarding or rebound  Lymphadenopathy:     He has no cervical adenopathy  Neurological: He is alert and oriented to person, place, and time  Skin: Skin is warm and dry  He is not diaphoretic  Psychiatric: He has a normal mood and affect  His behavior is normal  Judgment and thought content normal    Nursing note and vitals reviewed

## 2018-03-02 NOTE — ASSESSMENT & PLAN NOTE
Generalized intermittent abdominal pain in a patient with a history of prior abdominal surgery and diverticulitis  Pain does not seem to be acute or surgical in nature however given that it has been present and slightly worse over the last 6 weeks will check CT scan

## 2018-03-10 ENCOUNTER — HOSPITAL ENCOUNTER (OUTPATIENT)
Dept: CT IMAGING | Facility: HOSPITAL | Age: 53
Discharge: HOME | End: 2018-03-10
Payer: COMMERCIAL

## 2018-03-10 DIAGNOSIS — K57.32 DIVERTICULITIS OF LARGE INTESTINE WITHOUT PERFORATION OR ABSCESS WITHOUT BLEEDING: ICD-10-CM

## 2018-03-10 DIAGNOSIS — R10.84 GENERALIZED ABDOMINAL PAIN: ICD-10-CM

## 2018-03-10 PROCEDURE — 74176 CT ABD & PELVIS W/O CONTRAST: CPT

## 2018-08-14 ENCOUNTER — OFFICE VISIT (OUTPATIENT)
Dept: FAMILY MEDICINE CLINIC | Facility: CLINIC | Age: 53
End: 2018-08-14

## 2018-08-14 VITALS
BODY MASS INDEX: 31.57 KG/M2 | DIASTOLIC BLOOD PRESSURE: 62 MMHG | HEART RATE: 69 BPM | SYSTOLIC BLOOD PRESSURE: 110 MMHG | WEIGHT: 246 LBS | HEIGHT: 74 IN

## 2018-08-14 DIAGNOSIS — Z02.89 ENCOUNTER FOR FEDERAL AVIATION ADMINISTRATION (FAA) EXAMINATION: Primary | ICD-10-CM

## 2018-08-14 PROCEDURE — 93000 ELECTROCARDIOGRAM COMPLETE: CPT | Performed by: FAMILY MEDICINE

## 2018-08-14 PROCEDURE — 99499 UNLISTED E&M SERVICE: CPT | Performed by: FAMILY MEDICINE

## 2018-08-14 NOTE — PROGRESS NOTES
Chief Complaint   Patient presents with    Physical Exam     [facility] 1ST CLASS ,WITH EKG/MEDS FJTW [id number]

## 2018-11-19 DIAGNOSIS — Z13.29 SCREENING FOR THYROID DISORDER: Primary | ICD-10-CM

## 2018-11-19 DIAGNOSIS — Z12.5 SCREENING FOR PROSTATE CANCER: ICD-10-CM

## 2018-11-19 DIAGNOSIS — Z13.1 SCREENING FOR DIABETES MELLITUS: ICD-10-CM

## 2018-11-19 DIAGNOSIS — E78.5 HYPERLIPIDEMIA, UNSPECIFIED HYPERLIPIDEMIA TYPE: ICD-10-CM

## 2018-11-23 ENCOUNTER — APPOINTMENT (OUTPATIENT)
Dept: LAB | Facility: MEDICAL CENTER | Age: 53
End: 2018-11-23
Payer: COMMERCIAL

## 2018-11-23 DIAGNOSIS — E78.5 HYPERLIPIDEMIA, UNSPECIFIED HYPERLIPIDEMIA TYPE: ICD-10-CM

## 2018-11-23 DIAGNOSIS — Z12.5 SCREENING FOR PROSTATE CANCER: ICD-10-CM

## 2018-11-23 DIAGNOSIS — Z13.1 SCREENING FOR DIABETES MELLITUS: ICD-10-CM

## 2018-11-23 DIAGNOSIS — Z13.29 SCREENING FOR THYROID DISORDER: ICD-10-CM

## 2018-11-23 LAB
ALBUMIN SERPL BCP-MCNC: 4.4 G/DL (ref 3.5–5)
ALP SERPL-CCNC: 101 U/L (ref 46–116)
ALT SERPL W P-5'-P-CCNC: 46 U/L (ref 12–78)
ANION GAP SERPL CALCULATED.3IONS-SCNC: 3 MMOL/L (ref 4–13)
AST SERPL W P-5'-P-CCNC: 21 U/L (ref 5–45)
BILIRUB SERPL-MCNC: 0.69 MG/DL (ref 0.2–1)
BUN SERPL-MCNC: 13 MG/DL (ref 5–25)
CALCIUM SERPL-MCNC: 8.7 MG/DL (ref 8.3–10.1)
CHLORIDE SERPL-SCNC: 104 MMOL/L (ref 100–108)
CHOLEST SERPL-MCNC: 185 MG/DL (ref 50–200)
CO2 SERPL-SCNC: 30 MMOL/L (ref 21–32)
CREAT SERPL-MCNC: 1.07 MG/DL (ref 0.6–1.3)
GFR SERPL CREATININE-BSD FRML MDRD: 79 ML/MIN/1.73SQ M
GLUCOSE P FAST SERPL-MCNC: 91 MG/DL (ref 65–99)
HDLC SERPL-MCNC: 47 MG/DL (ref 40–60)
LDLC SERPL CALC-MCNC: 115 MG/DL (ref 0–100)
POTASSIUM SERPL-SCNC: 4.4 MMOL/L (ref 3.5–5.3)
PROT SERPL-MCNC: 7.8 G/DL (ref 6.4–8.2)
SODIUM SERPL-SCNC: 137 MMOL/L (ref 136–145)
TRIGL SERPL-MCNC: 114 MG/DL
TSH SERPL DL<=0.05 MIU/L-ACNC: 3.85 UIU/ML (ref 0.36–3.74)

## 2018-11-23 PROCEDURE — 84153 ASSAY OF PSA TOTAL: CPT

## 2018-11-23 PROCEDURE — 84154 ASSAY OF PSA FREE: CPT

## 2018-11-23 PROCEDURE — 36415 COLL VENOUS BLD VENIPUNCTURE: CPT

## 2018-11-23 PROCEDURE — 80061 LIPID PANEL: CPT

## 2018-11-23 PROCEDURE — 80053 COMPREHEN METABOLIC PANEL: CPT

## 2018-11-23 PROCEDURE — 84443 ASSAY THYROID STIM HORMONE: CPT

## 2018-11-24 LAB
PSA FREE MFR SERPL: 45 %
PSA FREE SERPL-MCNC: 0.18 NG/ML
PSA SERPL-MCNC: 0.4 NG/ML (ref 0–4)

## 2018-12-05 ENCOUNTER — OFFICE VISIT (OUTPATIENT)
Dept: FAMILY MEDICINE CLINIC | Facility: CLINIC | Age: 53
End: 2018-12-05
Payer: COMMERCIAL

## 2018-12-05 VITALS
HEIGHT: 74 IN | BODY MASS INDEX: 31.79 KG/M2 | SYSTOLIC BLOOD PRESSURE: 128 MMHG | WEIGHT: 247.7 LBS | OXYGEN SATURATION: 97 % | DIASTOLIC BLOOD PRESSURE: 82 MMHG | TEMPERATURE: 98.8 F | HEART RATE: 90 BPM

## 2018-12-05 DIAGNOSIS — Z00.00 ANNUAL PHYSICAL EXAM: Primary | ICD-10-CM

## 2018-12-05 DIAGNOSIS — E66.9 OBESITY (BMI 30.0-34.9): ICD-10-CM

## 2018-12-05 PROCEDURE — 99396 PREV VISIT EST AGE 40-64: CPT | Performed by: FAMILY MEDICINE

## 2018-12-05 RX ORDER — PROPRANOLOL/HYDROCHLOROTHIAZID 40 MG-25MG
1 TABLET ORAL
COMMUNITY

## 2018-12-05 RX ORDER — MULTIVIT-MIN/IRON FUM/FOLIC AC 7.5 MG-4
1 TABLET ORAL DAILY
COMMUNITY

## 2018-12-05 NOTE — PROGRESS NOTES
ADULT ANNUAL PHYSICAL  [facility] - [address] [facility]    NAME: Angelika Sandoval  AGE: 48 y o  SEX: male  : 1965     DATE: 2018     Assessment and Plan:    patient's physical exam is fairly unremarkable  We reviewed recent fasting blood work all of which is acceptable  His blood pressure has been borderline at times but it is good today  We did discuss his ongoing chronic abdominal pain which I believe may be coming from multiple sources  He does have right lower quadrant pain which seems to be related to physical activity  This may be related to his prior extensive bowel surgery for diverticulitis  He also has GI symptoms with gas bloating and discomfort especially after certain foods  This may be more of an irritable bowel situation  He is not interested in taking medication for his symptoms but is frustrated with them  The other issue is more difficult to discern  He does state that he does not feel well he feels somewhat dysphoric in over well as at times  He has multiple stressors related to family issues  He also has a high-stress occupation as an   He does not exhibit overt signs of depression or generalized anxiety and is not interested in medication  I encouraged him to continue with his exercise regimen and keeping on has stable at diet as possible  He should limit caffeine and alcohol  He is up-to-date on general screening  Problem List Items Addressed This Visit     None          Health maintenance and preventative care screenings were discussed with patient today  Appropriate education was printed on patient's after visit summary  · Discussed risks/benefits of screening for prostate cancer, high cholesterol and diabetes  Patient is up-to-date with their preventive screenings  · Immunizations were reviewed: patient is up-to-date with his immunizations      Counseling:  · Dental Health: discussed importance of regular tooth brushing, flossing, and dental visits  No Follow-up on file  Chief Complaint:     Chief Complaint   Patient presents with    Physical Exam      History of Present Illness:     Adult Annual Physical   Patient here for a comprehensive physical exam  The patient reports   Chronic abdominal pain and mild dysphoric mood  Diet and Physical Activity  · Diet/Nutrition: well balanced diet  · Weight concerns: patient has class 1 obesity (BMI 30-34 9)  · Exercise: vigorous cardiovascular exercise, strength training exercises and 3-4 times a week on average  Depression Screening  PHQ-9 Depression Screening    PHQ-9:    Frequency of the following problems over the past two weeks:       Little interest or pleasure in doing things:  0 - not at all  Feeling down, depressed, or hopeless:  0 - not at all  PHQ-2 Score:  0       General Health  · Sleep: sleeps well  · Hearing: normal - bilateral   · Vision: no vision problems  · Dental: regular dental visits   Health  · Erectile dysfunction: no        Review of Systems:     Review of Systems   Constitutional: Negative  HENT: Negative  Negative for congestion, ear pain, hearing loss, nosebleeds, sore throat and trouble swallowing  Eyes: Negative  Respiratory: Negative for apnea, cough, chest tightness, shortness of breath and wheezing  Cardiovascular: Negative  Gastrointestinal: Positive for abdominal pain  Negative for blood in stool, constipation, diarrhea, nausea and vomiting  Endocrine: Negative  Genitourinary: Negative for difficulty urinating, dysuria, frequency, hematuria and urgency  Musculoskeletal: Negative for arthralgias, joint swelling and myalgias  Skin: Negative for rash  Neurological: Negative for dizziness, syncope, light-headedness, numbness and headaches  Hematological: Negative  Psychiatric/Behavioral: Positive for dysphoric mood  Negative for confusion and sleep disturbance   The patient is not nervous/anxious  Past Medical History:     History reviewed  No pertinent past medical history  Past Surgical History:     History reviewed  No pertinent surgical history  Social History:     Social History     Social History    Marital status: /Civil Union     Spouse name: N/A    Number of children: N/A    Years of education: N/A     Social History Main Topics    Smoking status: Never Smoker    Smokeless tobacco: Never Used    Alcohol use Yes    Drug use: No    Sexual activity: Yes     Partners: Female     Other Topics Concern    None     Social History Narrative    None      Family History:     History reviewed  No pertinent family history  Current Medications:     Current Outpatient Prescriptions   Medication Sig Dispense Refill    Acai Berry 500 MG CAPS 2,000 mg      acyclovir (ZOVIRAX) 5 % ointment Apply topically 4 (four) times a day      aspirin 81 MG tablet Take 1 capsule by mouth daily      B-Complex-C CAPS take one cap by mouth once daily      Cholecalciferol (VITAMIN D3) 1000 units CAPS Take 1 tablet by mouth daily      Cholecalciferol 1000 units capsule Take 1,000 Units by mouth      co-enzyme Q-10 100 mg capsule Take 100 mg by mouth      montelukast (SINGULAIR) 10 mg tablet Take 1 tablet by mouth daily      Multiple Vitamins-Minerals (MULTIVITAMIN WITH MINERALS) tablet Take 1 tablet by mouth daily      Omega-3 Fatty Acids (FISH OIL) 1200 MG CAPS Take 1 capsule by mouth daily      Turmeric (CURCUMIN 95) 500 MG CAPS Take 1 capsule by mouth      albuterol (2 5 mg/3 mL) 0 083 % nebulizer solution Inhale 1 each      fluticasone-salmeterol (ADVAIR DISKUS) 250-50 mcg/dose inhaler Inhale      fluticasone-salmeterol (ADVAIR DISKUS) 500-50 mcg/dose Inhale      SYMBICORT 160-4 5 MCG/ACT inhaler INL 2 PUFFS PO Q 12 H  3     No current facility-administered medications for this visit  Allergies:      Allergies   Allergen Reactions    Bee Venom Other (See Comments) swelling      Objective:     /82   Pulse 90   Temp 98 8 °F (37 1 °C) (Tympanic)   Ht 6' 2" (1 88 m)   Wt 112 kg (247 lb 11 2 oz)   SpO2 97%   BMI 31 80 kg/m²     Physical Exam   Constitutional: He is oriented to person, place, and time  He appears well-developed and well-nourished  HENT:   Head: Normocephalic and atraumatic  Right Ear: External ear normal    Left Ear: External ear normal    Nose: Nose normal    Mouth/Throat: Oropharynx is clear and moist    Eyes: Pupils are equal, round, and reactive to light  Conjunctivae and EOM are normal    Neck: Normal range of motion  Neck supple  Cardiovascular: Normal rate, regular rhythm, normal heart sounds and intact distal pulses  Pulmonary/Chest: Effort normal and breath sounds normal    Musculoskeletal: Normal range of motion  Neurological: He is alert and oriented to person, place, and time  Skin: Skin is warm and dry  Psychiatric: He has a normal mood and affect  His behavior is normal  Judgment and thought content normal    Nursing note and vitals reviewed         Health Maintenance:     Health Maintenance   Topic Date Due    CRC Screening: Colonoscopy  10/07/2019    Depression Screening PHQ  12/05/2019    Hepatitis C Screening  Completed    INFLUENZA VACCINE  Completed    DTaP,Tdap,and Td Vaccines  Excluded     Immunization History   Administered Date(s) Administered    Influenza 11/02/2018    Influenza Quadrivalent, 6-35 Months IM 12/16/2016, 09/30/2017    Influenza TIV (IM) 10/20/2014    Tdap 12/16/2016       Tali Joshi DO  [address] [address]

## 2018-12-05 NOTE — PATIENT INSTRUCTIONS
Wellness Visit for Adults   AMBULATORY CARE:   A wellness visit  is when you see your healthcare provider to get screened for health problems  You can also get advice on how to stay healthy  Write down your questions so you remember to ask them  Ask your healthcare provider how often you should have a wellness visit  What happens at a wellness visit:  Your healthcare provider will ask about your health, and your family history of health problems  This includes high blood pressure, heart disease, and cancer  He or she will ask if you have symptoms that concern you, if you smoke, and about your mood  You may also be asked about your intake of medicines, supplements, food, and alcohol  Any of the following may be done:  · Your weight  will be checked  Your height may also be checked so your body mass index (BMI) can be calculated  Your BMI shows if you are at a healthy weight  · Your blood pressure  and heart rate will be checked  Your temperature may also be checked  · Blood and urine tests  may be done  Blood tests may be done to check your cholesterol levels  Abnormal cholesterol levels increase your risk for heart disease and stroke  You may also need a blood or urine test to check for diabetes if you are at increased risk  Urine tests may be done to look for signs of an infection or kidney disease  · A physical exam  includes checking your heartbeat and lungs with a stethoscope  Your healthcare provider may also check your skin to look for sun damage  · Screening tests  may be recommended  A screening test is done to check for diseases that may not cause symptoms  The screening tests you may need depend on your age, gender, family history, and lifestyle habits  For example, colorectal screening may be recommended if you are 48years old or older  Screening tests you need if you are a woman:   · A Pap smear  is used to screen for cervical cancer   Pap smears are usually done every 3 to 5 years depending on your age  You may need them more often if you have had abnormal Pap smear test results in the past  Ask your healthcare provider how often you should have a Pap smear  · A mammogram  is an x-ray of your breasts to screen for breast cancer  Experts recommend mammograms every 2 years starting at age 48 years  You may need a mammogram at age 52 years or younger if you have an increased risk for breast cancer  Talk to your healthcare provider about when you should start having mammograms and how often you need them  Vaccines you may need:   · Get an influenza vaccine  every year  The influenza vaccine protects you from the flu  Several types of viruses cause the flu  The viruses change over time, so new vaccines are made each year  · Get a tetanus-diphtheria (Td) booster vaccine  every 10 years  This vaccine protects you against tetanus and diphtheria  Tetanus is a severe infection that may cause painful muscle spasms and lockjaw  Diphtheria is a severe bacterial infection that causes a thick covering in the back of your mouth and throat  · Get a human papillomavirus (HPV) vaccine  if you are female and aged 23 to 32 or male 23 to 24 and never received it  This vaccine protects you from HPV infection  HPV is the most common infection spread by sexual contact  HPV may also cause vaginal, penile, and anal cancers  · Get a pneumococcal vaccine  if you are aged 72 years or older  The pneumococcal vaccine is an injection given to protect you from pneumococcal disease  Pneumococcal disease is an infection caused by pneumococcal bacteria  The infection may cause pneumonia, meningitis, or an ear infection  · Get a shingles vaccine  if you are aged 61 or older, even if you have had shingles before  The shingles vaccine is an injection to protect you from the varicella-zoster virus  This is the same virus that causes chickenpox   Shingles is a painful rash that develops in people who had chickenpox or have been exposed to the virus  How to eat healthy:  My Plate is a model for planning healthy meals  It shows the types and amounts of foods that should go on your plate  Fruits and vegetables make up about half of your plate, and grains and protein make up the other half  A serving of dairy is included on the side of your plate  The amount of calories and serving sizes you need depends on your age, gender, weight, and height  Examples of healthy foods are listed below:  · Eat a variety of vegetables  such as dark green, red, and orange vegetables  You can also include canned vegetables low in sodium (salt) and frozen vegetables without added butter or sauces  · Eat a variety of fresh fruits , canned fruit in 100% juice, frozen fruit, and dried fruit  · Include whole grains  At least half of the grains you eat should be whole grains  Examples include whole-wheat bread, wheat pasta, brown rice, and whole-grain cereals such as oatmeal     · Eat a variety of protein foods such as seafood (fish and shellfish), lean meat, and poultry without skin (turkey and chicken)  Examples of lean meats include pork leg, shoulder, or tenderloin, and beef round, sirloin, tenderloin, and extra lean ground beef  Other protein foods include eggs and egg substitutes, beans, peas, soy products, nuts, and seeds  · Choose low-fat dairy products such as skim or 1% milk or low-fat yogurt, cheese, and cottage cheese  · Limit unhealthy fats  such as butter, hard margarine, and shortening  Exercise:  Exercise at least 30 minutes per day on most days of the week  Some examples of exercise include walking, biking, dancing, and swimming  You can also fit in more physical activity by taking the stairs instead of the elevator or parking farther away from stores  Include muscle strengthening activities 2 days each week  Regular exercise provides many health benefits   It helps you manage your weight, and decreases your risk for type 2 diabetes, heart disease, stroke, and high blood pressure  Exercise can also help improve your mood  Ask your healthcare provider about the best exercise plan for you  General health and safety guidelines:   · Do not smoke  Nicotine and other chemicals in cigarettes and cigars can cause lung damage  Ask your healthcare provider for information if you currently smoke and need help to quit  E-cigarettes or smokeless tobacco still contain nicotine  Talk to your healthcare provider before you use these products  · Limit alcohol  A drink of alcohol is 12 ounces of beer, 5 ounces of wine, or 1½ ounces of liquor  · Lose weight, if needed  Being overweight increases your risk of certain health conditions  These include heart disease, high blood pressure, type 2 diabetes, and certain types of cancer  · Protect your skin  Do not sunbathe or use tanning beds  Use sunscreen with a SPF 15 or higher  Apply sunscreen at least 15 minutes before you go outside  Reapply sunscreen every 2 hours  Wear protective clothing, hats, and sunglasses when you are outside  · Drive safely  Always wear your seatbelt  Make sure everyone in your car wears a seatbelt  A seatbelt can save your life if you are in an accident  Do not use your cell phone when you are driving  This could distract you and cause an accident  Pull over if you need to make a call or send a text message  · Practice safe sex  Use latex condoms if are sexually active and have more than one partner  Your healthcare provider may recommend screening tests for sexually transmitted infections (STIs)  · Wear helmets, lifejackets, and protective gear  Always wear a helmet when you ride a bike or motorcycle, go skiing, or play sports that could cause a head injury  Wear protective equipment when you play sports  Wear a lifejacket when you are on a boat or doing water sports    © 2017 [employer] Information is for End User's use only and may not be sold, redistributed or otherwise used for commercial purposes  All illustrations and images included in CareNotes® are the copyrighted property of [employer]  or [employer]  The above information is an  only  It is not intended as medical advice for individual conditions or treatments  Talk to your doctor, nurse or pharmacist before following any medical regimen to see if it is safe and effective for you  Weight Management   AMBULATORY CARE:   Why it is important to manage your weight:  Being overweight increases your risk of health conditions such as heart disease, high blood pressure, type 2 diabetes, and certain types of cancer  It can also increase your risk for osteoarthritis, sleep apnea, and other respiratory problems  Aim for a slow, steady weight loss  Even a small amount of weight loss can lower your risk of health problems  How to lose weight safely:  A safe and healthy way to lose weight is to eat fewer calories and get regular exercise  You can lose up about 1 pound a week by decreasing the number of calories you eat by 500 calories each day  You can decrease calories by eating smaller portion sizes or by cutting out high-calorie foods  Read labels to find out how many calories are in the foods you eat  You can also burn calories with exercise such as walking, swimming, or biking  You will be more likely to keep weight off if you make these changes part of your lifestyle  Healthy meal plan for weight management:  A healthy meal plan includes a variety of foods, contains fewer calories, and helps you stay healthy  A healthy meal plan includes the following:  · Eat whole-grain foods more often  A healthy meal plan should contain fiber  Fiber is the part of grains, fruits, and vegetables that is not broken down by your body  Whole-grain foods are healthy and provide extra fiber in your diet   Some examples of whole-grain foods are whole-wheat breads and pastas, oatmeal, brown rice, and bulgur  · Eat a variety of vegetables every day  Include dark, leafy greens such as spinach, kale, malina greens, and mustard greens  Eat yellow and orange vegetables such as carrots, sweet potatoes, and winter squash  · Eat a variety of fruits every day  Choose fresh or canned fruit (canned in its own juice or light syrup) instead of juice  Fruit juice has very little or no fiber  · Eat low-fat dairy foods  Drink fat-free (skim) milk or 1% milk  Eat fat-free yogurt and low-fat cottage cheese  Try low-fat cheeses such as mozzarella and other reduced-fat cheeses  · Choose meat and other protein foods that are low in fat  Choose beans or other legumes such as split peas or lentils  Choose fish, skinless poultry (chicken or turkey), or lean cuts of red meat (beef or pork)  Before you cook meat or poultry, cut off any visible fat  · Use less fat and oil  Try baking foods instead of frying them  Add less fat, such as margarine, sour cream, regular salad dressing and mayonnaise to foods  Eat fewer high-fat foods  Some examples of high-fat foods include french fries, doughnuts, ice cream, and cakes  · Eat fewer sweets  Limit foods and drinks that are high in sugar  This includes candy, cookies, regular soda, and sweetened drinks  Ways to decrease calories:   · Eat smaller portions  ¨ Use a small plate with smaller servings  ¨ Do not eat second helpings  ¨ When you eat at a restaurant, ask for a box and place half of your meal in the box before you eat  ¨ Share an entrée with someone else  · Replace high-calorie snacks with healthy, low-calorie snacks  ¨ Choose fresh fruit, vegetables, fat-free rice cakes, or air-popped popcorn instead of potato chips, nuts, or chocolate  ¨ Choose water or calorie-free drinks instead of soda or sweetened drinks  · Eat regular meals  Skipping meals can lead to overeating later in the day   Eat a healthy snack in place of a meal if you do not have time to eat a regular meal      · Do not shop for groceries when you are hungry  You may be more likely to make unhealthy food choices  Take a grocery list of healthy foods and shop after you have eaten  Exercise:  Exercise at least 30 minutes per day on most days of the week  Some examples of exercise include walking, biking, dancing, and swimming  You can also fit in more physical activity by taking the stairs instead of the elevator or parking farther away from stores  Ask your healthcare provider about the best exercise plan for you  Other things to consider as you try to lose weight:   · Be aware of situations that may give you the urge to overeat, such as eating while watching television  Find ways to avoid these situations  For example, read a book, go for a walk, or do crafts  · Meet with a weight loss support group or friends who are also trying to lose weight  This may help you stay motivated to continue working on your weight loss goals  © 2017 [employer] Information is for End User's use only and may not be sold, redistributed or otherwise used for commercial purposes  All illustrations and images included in CareNotes® are the copyrighted property of [employer]  or [employer]  The above information is an  only  It is not intended as medical advice for individual conditions or treatments  Talk to your doctor, nurse or pharmacist before following any medical regimen to see if it is safe and effective for you

## 2019-02-14 ENCOUNTER — OFFICE VISIT (OUTPATIENT)
Dept: FAMILY MEDICINE CLINIC | Facility: CLINIC | Age: 54
End: 2019-02-14
Payer: COMMERCIAL

## 2019-02-14 VITALS
DIASTOLIC BLOOD PRESSURE: 68 MMHG | HEART RATE: 79 BPM | HEIGHT: 74 IN | SYSTOLIC BLOOD PRESSURE: 116 MMHG | WEIGHT: 249 LBS | BODY MASS INDEX: 31.95 KG/M2

## 2019-02-14 DIAGNOSIS — Z02.89 ENCOUNTER FOR FEDERAL AVIATION ADMINISTRATION (FAA) EXAMINATION: Primary | ICD-10-CM

## 2019-02-14 PROCEDURE — 3008F BODY MASS INDEX DOCD: CPT | Performed by: FAMILY MEDICINE

## 2019-02-14 PROCEDURE — 99499 UNLISTED E&M SERVICE: CPT | Performed by: FAMILY MEDICINE

## 2019-02-14 NOTE — PROGRESS NOTES
Chief Complaint   Patient presents with    Physical Exam     1st Class FAA No EKG (08/15/2018) W/Glasses Conf Numb [id number] NO SODA and NO JOSEPH

## 2019-08-06 ENCOUNTER — OFFICE VISIT (OUTPATIENT)
Dept: FAMILY MEDICINE CLINIC | Facility: CLINIC | Age: 54
End: 2019-08-06

## 2019-08-06 VITALS
WEIGHT: 247 LBS | HEIGHT: 74 IN | HEART RATE: 89 BPM | DIASTOLIC BLOOD PRESSURE: 82 MMHG | BODY MASS INDEX: 31.7 KG/M2 | SYSTOLIC BLOOD PRESSURE: 118 MMHG

## 2019-08-06 DIAGNOSIS — Z02.89 ENCOUNTER FOR FEDERAL AVIATION ADMINISTRATION (FAA) EXAMINATION: Primary | ICD-10-CM

## 2019-08-06 PROCEDURE — 93000 ELECTROCARDIOGRAM COMPLETE: CPT | Performed by: FAMILY MEDICINE

## 2019-08-06 PROCEDURE — 99499 UNLISTED E&M SERVICE: CPT | Performed by: FAMILY MEDICINE

## 2019-08-06 NOTE — PROGRESS NOTES
Chief Complaint   Patient presents with    Physical Exam     FAA 1st class (WITH EKG) [facility] #[id number] corrected vision  No meds

## 2019-09-25 ENCOUNTER — OFFICE VISIT (OUTPATIENT)
Dept: FAMILY MEDICINE CLINIC | Facility: CLINIC | Age: 54
End: 2019-09-25
Payer: COMMERCIAL

## 2019-09-25 ENCOUNTER — TELEPHONE (OUTPATIENT)
Dept: FAMILY MEDICINE CLINIC | Facility: CLINIC | Age: 54
End: 2019-09-25

## 2019-09-25 VITALS
DIASTOLIC BLOOD PRESSURE: 88 MMHG | HEIGHT: 74 IN | BODY MASS INDEX: 32.03 KG/M2 | WEIGHT: 249.6 LBS | SYSTOLIC BLOOD PRESSURE: 118 MMHG | OXYGEN SATURATION: 96 % | HEART RATE: 86 BPM | TEMPERATURE: 98.1 F | RESPIRATION RATE: 16 BRPM

## 2019-09-25 DIAGNOSIS — T63.441A BEE STING REACTION, ACCIDENTAL OR UNINTENTIONAL, INITIAL ENCOUNTER: Primary | ICD-10-CM

## 2019-09-25 DIAGNOSIS — Z23 NEED FOR INFLUENZA VACCINATION: ICD-10-CM

## 2019-09-25 PROBLEM — IMO0002 CURRENT TEAR OF MEDIAL CARTILAGE OR MENISCUS OF KNEE: Status: ACTIVE | Noted: 2019-09-25

## 2019-09-25 PROBLEM — M67.00 ACQUIRED SHORT ACHILLES TENDON: Status: ACTIVE | Noted: 2019-09-25

## 2019-09-25 PROBLEM — M77.50 TENDINITIS OF ANKLE: Status: ACTIVE | Noted: 2019-09-25

## 2019-09-25 PROBLEM — G56.30 LESION OF RADIAL NERVE: Status: ACTIVE | Noted: 2019-09-25

## 2019-09-25 PROBLEM — R06.09 DYSPNEA ON EXERTION: Status: RESOLVED | Noted: 2017-02-23 | Resolved: 2019-09-25

## 2019-09-25 PROBLEM — Z02.89 ENCOUNTER FOR FEDERAL AVIATION ADMINISTRATION (FAA) EXAMINATION: Status: RESOLVED | Noted: 2019-02-14 | Resolved: 2019-09-25

## 2019-09-25 PROBLEM — R06.00 DYSPNEA ON EXERTION: Status: RESOLVED | Noted: 2017-02-23 | Resolved: 2019-09-25

## 2019-09-25 PROBLEM — M22.40 CHONDROMALACIA PATELLAE: Status: ACTIVE | Noted: 2019-09-25

## 2019-09-25 PROBLEM — R10.84 GENERALIZED ABDOMINAL PAIN: Status: RESOLVED | Noted: 2018-03-02 | Resolved: 2019-09-25

## 2019-09-25 PROCEDURE — 90682 RIV4 VACC RECOMBINANT DNA IM: CPT | Performed by: PHYSICIAN ASSISTANT

## 2019-09-25 PROCEDURE — 90471 IMMUNIZATION ADMIN: CPT | Performed by: PHYSICIAN ASSISTANT

## 2019-09-25 PROCEDURE — 3008F BODY MASS INDEX DOCD: CPT | Performed by: PHYSICIAN ASSISTANT

## 2019-09-25 PROCEDURE — 99213 OFFICE O/P EST LOW 20 MIN: CPT | Performed by: PHYSICIAN ASSISTANT

## 2019-09-25 RX ORDER — METHYLPREDNISOLONE 4 MG/1
TABLET ORAL
Qty: 21 EACH | Refills: 0 | Status: SHIPPED | OUTPATIENT
Start: 2019-09-25 | End: 2020-11-09

## 2019-09-25 RX ORDER — NICOTINE 14MG/24HR
PATCH, TRANSDERMAL 24 HOURS TRANSDERMAL
COMMUNITY

## 2019-09-25 NOTE — TELEPHONE ENCOUNTER
Dr Sahara Tellez was in the office today  He will be scheduling an appt for a physical with you soon  He asked if you would please put bl wk orders in so he can have it done before seeing you  He also asked if you would please include a PSA      Thanks

## 2019-09-25 NOTE — PROGRESS NOTES
Assessment/Plan:    1  Bee sting reaction, accidental or unintentional, initial encounter  The reaction to bee venom  Discussed treatment options  Patient agreeable to Medrol pack  Finished course completely  No NSAIDs during course  Tylenol as needed  Benadryl as needed  Return to care if symptoms worsen or fail to improve  - methylPREDNISolone 4 MG tablet therapy pack; Use as directed on package  Dispense: 21 each; Refill: 0    2  Need for influenza vaccination  Influenza vaccine given today  Patient tolerated well  - influenza vaccine, 3620-4325, quadrivalent, recombinant, PF, 0 5 mL, for patients 18 yr+ (FLUBLOK)    No problem-specific Assessment & Plan notes found for this encounter  Patient Active Problem List   Diagnosis    Exercise-induced bronchospasm    Herpes simplex type 1 infection    Acquired short Achilles tendon    Tendinitis of ankle    Chondromalacia patellae    Current tear of medial cartilage or meniscus of knee    Lesion of radial nerve     Subjective:      Patient ID: Annika Rubio is a 47 y o  male  Patient is here complaining a bee sting 4 days ago  States he was stung on his right inner thigh  It has been red, swollen, and tender since then  He states it is not getting any better  It does feel warm at times  Denies fever  Denies spreading  States he has had reactions to bee stings in the past   Denies ever having a anaphylactic reaction  Denies cough, wheezing, and shortness of breath  He has been taking Benadryl to no relief  The following portions of the patient's history were reviewed and updated as appropriate: allergies, current medications, past family history, past medical history, past social history, past surgical history and problem list     Review of Systems   Constitutional: Negative for chills, fatigue and fever  HENT: Negative for congestion, ear pain, postnasal drip, rhinorrhea and sore throat      Respiratory: Negative for cough, chest tightness, shortness of breath and wheezing  Cardiovascular: Negative for chest pain and palpitations  Gastrointestinal: Negative for abdominal pain, diarrhea, nausea and vomiting  Musculoskeletal: Positive for arthralgias (Sees an orthopedic) and myalgias  Skin: Negative for rash  Allergic/Immunologic: Positive for environmental allergies  Neurological: Negative for dizziness, weakness, light-headedness, numbness and headaches  Hematological: Negative for adenopathy  Objective:      /88 (BP Location: Left arm, Patient Position: Sitting)   Pulse 86   Temp 98 1 °F (36 7 °C) (Tympanic)   Resp 16   Ht 6' 2" (1 88 m)   Wt 113 kg (249 lb 9 6 oz)   SpO2 96%   BMI 32 05 kg/m²          Physical Exam   Constitutional: He is oriented to person, place, and time  He appears well-developed and well-nourished  HENT:   Head: Normocephalic and atraumatic  Eyes: Pupils are equal, round, and reactive to light  Conjunctivae are normal    Neck: Normal range of motion  Neck supple  Cardiovascular: Normal rate, regular rhythm, S1 normal, S2 normal, normal heart sounds and intact distal pulses  Pulmonary/Chest: Effort normal and breath sounds normal    Abdominal: Soft  Normal appearance and bowel sounds are normal  He exhibits no mass  There is no hepatosplenomegaly  There is no tenderness  Musculoskeletal: Normal range of motion  Lymphadenopathy:     He has no cervical adenopathy  Neurological: He is alert and oriented to person, place, and time  Skin: Skin is warm, dry and intact  No rash noted  3x2cm area of erythema and induration, tender to touch surrounding mild erythema right inner thigh  No warmth or fluctuance  Psychiatric: He has a normal mood and affect  His behavior is normal  Judgment and thought content normal    Nursing note and vitals reviewed        Current Outpatient Medications on File Prior to Visit   Medication Sig Dispense Refill    Acai Berry 500 MG CAPS 2,000 mg      albuterol (2 5 mg/3 mL) 0 083 % nebulizer solution Inhale 1 each      aspirin 81 MG tablet Take 1 capsule by mouth daily      B-Complex-C CAPS take one cap by mouth once daily      Cholecalciferol (VITAMIN D3) 1000 units CAPS Take 1 tablet by mouth daily      Cholecalciferol 1000 units capsule Take 1,000 Units by mouth      co-enzyme Q-10 100 mg capsule Take 100 mg by mouth      montelukast (SINGULAIR) 10 mg tablet Take 1 tablet by mouth daily      Multiple Vitamins-Minerals (MULTIVITAMIN WITH MINERALS) tablet Take 1 tablet by mouth daily      Omega-3 Fatty Acids (FISH OIL) 1200 MG CAPS Take 1 capsule by mouth daily      Saccharomyces boulardii (PROBIOTIC) 250 MG CAPS Take by mouth      SYMBICORT 160-4 5 MCG/ACT inhaler INL 2 PUFFS PO Q 12 H  3    Turmeric (CURCUMIN 95) 500 MG CAPS Take 1 capsule by mouth      acyclovir (ZOVIRAX) 5 % ointment Apply topically 4 (four) times a day      fluticasone-salmeterol (ADVAIR DISKUS) 250-50 mcg/dose inhaler Inhale      fluticasone-salmeterol (ADVAIR DISKUS) 500-50 mcg/dose Inhale       No current facility-administered medications on file prior to visit

## 2019-10-23 ENCOUNTER — TELEPHONE (OUTPATIENT)
Dept: FAMILY MEDICINE CLINIC | Facility: CLINIC | Age: 54
End: 2019-10-23

## 2019-10-23 DIAGNOSIS — Z12.5 SCREENING FOR PROSTATE CANCER: ICD-10-CM

## 2019-10-23 DIAGNOSIS — E78.2 MIXED HYPERLIPIDEMIA: Primary | ICD-10-CM

## 2019-10-23 NOTE — TELEPHONE ENCOUNTER
Patient came into the office requesting BW orders to be placed so that he can have this and his physical entered before 11/30/19  Patient is schedule for a physical 11/18  Atilio Simpson please advise

## 2019-11-04 ENCOUNTER — TRANSCRIBE ORDERS (OUTPATIENT)
Dept: ADMINISTRATIVE | Facility: HOSPITAL | Age: 54
End: 2019-11-04

## 2019-11-04 ENCOUNTER — APPOINTMENT (OUTPATIENT)
Dept: LAB | Facility: MEDICAL CENTER | Age: 54
End: 2019-11-04
Payer: COMMERCIAL

## 2019-11-04 DIAGNOSIS — J45.40 MODERATE PERSISTENT ASTHMA, UNSPECIFIED WHETHER COMPLICATED: ICD-10-CM

## 2019-11-04 DIAGNOSIS — D84.89 PRIMARY IMMUNE DEFICIENCY DISORDER (HCC): ICD-10-CM

## 2019-11-04 DIAGNOSIS — E78.2 MIXED HYPERLIPIDEMIA: Primary | ICD-10-CM

## 2019-11-04 DIAGNOSIS — D84.89 PRIMARY IMMUNE DEFICIENCY DISORDER (HCC): Primary | ICD-10-CM

## 2019-11-04 DIAGNOSIS — Z12.5 SCREENING FOR PROSTATE CANCER: ICD-10-CM

## 2019-11-04 LAB
ALBUMIN SERPL BCP-MCNC: 4 G/DL (ref 3.5–5)
ALP SERPL-CCNC: 94 U/L (ref 46–116)
ALT SERPL W P-5'-P-CCNC: 41 U/L (ref 12–78)
ANION GAP SERPL CALCULATED.3IONS-SCNC: 6 MMOL/L (ref 4–13)
AST SERPL W P-5'-P-CCNC: 18 U/L (ref 5–45)
BASOPHILS # BLD AUTO: 0.08 THOUSANDS/ΜL (ref 0–0.1)
BASOPHILS NFR BLD AUTO: 2 % (ref 0–1)
BILIRUB SERPL-MCNC: 0.52 MG/DL (ref 0.2–1)
BUN SERPL-MCNC: 20 MG/DL (ref 5–25)
CALCIUM SERPL-MCNC: 8.9 MG/DL (ref 8.3–10.1)
CHLORIDE SERPL-SCNC: 109 MMOL/L (ref 100–108)
CHOLEST SERPL-MCNC: 171 MG/DL (ref 50–200)
CO2 SERPL-SCNC: 24 MMOL/L (ref 21–32)
CREAT SERPL-MCNC: 0.98 MG/DL (ref 0.6–1.3)
EOSINOPHIL # BLD AUTO: 0.19 THOUSAND/ΜL (ref 0–0.61)
EOSINOPHIL NFR BLD AUTO: 4 % (ref 0–6)
ERYTHROCYTE [DISTWIDTH] IN BLOOD BY AUTOMATED COUNT: 11.9 % (ref 11.6–15.1)
GFR SERPL CREATININE-BSD FRML MDRD: 87 ML/MIN/1.73SQ M
GLUCOSE P FAST SERPL-MCNC: 103 MG/DL (ref 65–99)
HCT VFR BLD AUTO: 48.4 % (ref 36.5–49.3)
HDLC SERPL-MCNC: 47 MG/DL
HGB BLD-MCNC: 16.7 G/DL (ref 12–17)
IGA SERPL-MCNC: 241 MG/DL (ref 70–400)
IGG SERPL-MCNC: 778 MG/DL (ref 700–1600)
IGM SERPL-MCNC: 67 MG/DL (ref 40–230)
IMM GRANULOCYTES # BLD AUTO: 0.01 THOUSAND/UL (ref 0–0.2)
IMM GRANULOCYTES NFR BLD AUTO: 0 % (ref 0–2)
LDLC SERPL CALC-MCNC: 110 MG/DL (ref 0–100)
LYMPHOCYTES # BLD AUTO: 1.15 THOUSANDS/ΜL (ref 0.6–4.47)
LYMPHOCYTES NFR BLD AUTO: 25 % (ref 14–44)
MCH RBC QN AUTO: 31.9 PG (ref 26.8–34.3)
MCHC RBC AUTO-ENTMCNC: 34.5 G/DL (ref 31.4–37.4)
MCV RBC AUTO: 93 FL (ref 82–98)
MONOCYTES # BLD AUTO: 0.7 THOUSAND/ΜL (ref 0.17–1.22)
MONOCYTES NFR BLD AUTO: 16 % (ref 4–12)
NEUTROPHILS # BLD AUTO: 2.4 THOUSANDS/ΜL (ref 1.85–7.62)
NEUTS SEG NFR BLD AUTO: 53 % (ref 43–75)
NRBC BLD AUTO-RTO: 0 /100 WBCS
PLATELET # BLD AUTO: 207 THOUSANDS/UL (ref 149–390)
PMV BLD AUTO: 10.5 FL (ref 8.9–12.7)
POTASSIUM SERPL-SCNC: 4.3 MMOL/L (ref 3.5–5.3)
PROT SERPL-MCNC: 7.4 G/DL (ref 6.4–8.2)
RBC # BLD AUTO: 5.23 MILLION/UL (ref 3.88–5.62)
SODIUM SERPL-SCNC: 139 MMOL/L (ref 136–145)
TRIGL SERPL-MCNC: 68 MG/DL
TSH SERPL DL<=0.05 MIU/L-ACNC: 3.02 UIU/ML (ref 0.36–3.74)
WBC # BLD AUTO: 4.53 THOUSAND/UL (ref 4.31–10.16)

## 2019-11-04 PROCEDURE — 85025 COMPLETE CBC W/AUTO DIFF WBC: CPT

## 2019-11-04 PROCEDURE — 84443 ASSAY THYROID STIM HORMONE: CPT

## 2019-11-04 PROCEDURE — 82784 ASSAY IGA/IGD/IGG/IGM EACH: CPT

## 2019-11-04 PROCEDURE — 86355 B CELLS TOTAL COUNT: CPT

## 2019-11-04 PROCEDURE — 86360 T CELL ABSOLUTE COUNT/RATIO: CPT

## 2019-11-04 PROCEDURE — 36415 COLL VENOUS BLD VENIPUNCTURE: CPT

## 2019-11-04 PROCEDURE — 84153 ASSAY OF PSA TOTAL: CPT

## 2019-11-04 PROCEDURE — 80061 LIPID PANEL: CPT

## 2019-11-04 PROCEDURE — 84154 ASSAY OF PSA FREE: CPT

## 2019-11-04 PROCEDURE — 86359 T CELLS TOTAL COUNT: CPT

## 2019-11-04 PROCEDURE — 86357 NK CELLS TOTAL COUNT: CPT

## 2019-11-04 PROCEDURE — 86609 BACTERIUM ANTIBODY: CPT

## 2019-11-04 PROCEDURE — 80053 COMPREHEN METABOLIC PANEL: CPT

## 2019-11-05 LAB
MISCELLANEOUS LAB TEST RESULT: NORMAL
PSA FREE MFR SERPL: 40 %
PSA FREE SERPL-MCNC: 0.16 NG/ML
PSA SERPL-MCNC: 0.4 NG/ML (ref 0–4)

## 2019-11-07 LAB
DEPRECATED S PNEUM14 IGG SER-MCNC: 2.2 UG/ML
DEPRECATED S PNEUM19 IGG SER-MCNC: 1 UG/ML
DEPRECATED S PNEUM23 IGG SER-MCNC: 0.2 UG/ML
DEPRECATED S PNEUM3 IGG SER-MCNC: 0.5 UG/ML
DEPRECATED S PNEUM4 IGG SER-MCNC: <0.1 UG/ML
DEPRECATED S PNEUM8 AB SER-MCNC: <0.1 UG/ML
DEPRECATED S PNEUM9 IGG SER-MCNC: <0.1 UG/ML
FLUBV RNA SPEC QL NAA+PROBE: <0.1 UG/ML
S PNEUM DA 18C IGG SER-MCNC: 0.5 UG/ML
S PNEUM DA 19A IGG SER-MCNC: 1.1 UG/ML
S PNEUM DA 6B IGG SER-MCNC: 0.2 UG/ML
STREP PNEUMO TYPE 1: <0.1 UG/ML
STREP PNEUMO TYPE 51: 0.2 UG/ML
STREP PNEUMO TYPE 68: 0.2 UG/ML

## 2019-11-18 ENCOUNTER — OFFICE VISIT (OUTPATIENT)
Dept: FAMILY MEDICINE CLINIC | Facility: CLINIC | Age: 54
End: 2019-11-18
Payer: COMMERCIAL

## 2019-11-18 VITALS
HEART RATE: 72 BPM | TEMPERATURE: 97.6 F | HEIGHT: 75 IN | SYSTOLIC BLOOD PRESSURE: 122 MMHG | DIASTOLIC BLOOD PRESSURE: 84 MMHG | BODY MASS INDEX: 30.31 KG/M2 | WEIGHT: 243.8 LBS | OXYGEN SATURATION: 95 % | RESPIRATION RATE: 16 BRPM

## 2019-11-18 DIAGNOSIS — Z01.84 IMMUNITY STATUS TESTING: ICD-10-CM

## 2019-11-18 DIAGNOSIS — Z00.00 ANNUAL PHYSICAL EXAM: Primary | ICD-10-CM

## 2019-11-18 DIAGNOSIS — Z23 NEED FOR VACCINATION: ICD-10-CM

## 2019-11-18 PROCEDURE — 90732 PPSV23 VACC 2 YRS+ SUBQ/IM: CPT | Performed by: FAMILY MEDICINE

## 2019-11-18 PROCEDURE — 99396 PREV VISIT EST AGE 40-64: CPT | Performed by: FAMILY MEDICINE

## 2019-11-18 PROCEDURE — 90471 IMMUNIZATION ADMIN: CPT | Performed by: FAMILY MEDICINE

## 2019-11-19 NOTE — PATIENT INSTRUCTIONS

## 2019-11-19 NOTE — PROGRESS NOTES
[facility]    NAME: Angie Mcneil  AGE: 47 y o  SEX: male  : 1965     DATE: 2019     Assessment and Plan:     Problem List Items Addressed This Visit     None      Visit Diagnoses     Need for vaccination    -  Primary    Relevant Orders    PNEUMOCOCCAL POLYSACCHARIDE VACCINE 23-VALENT =>1YO SQ IM (Completed)    Immunity status testing        Relevant Orders    PNEUMOCOCCAL POLYSACCHARIDE VACCINE 23-VALENT =>1YO SQ IM (Completed)    Streptococcus Pneumoniae IgG Antibody    Annual physical exam        BMI 30 0-30 9,adult              Immunizations and preventive care screenings were discussed with patient today  Appropriate education was printed on patient's after visit summary  Counseling:  Dental Health: discussed importance of regular tooth brushing, flossing, and dental visits  · Exercise: the importance of regular exercise/physical activity was discussed  Recommend exercise 3-5 times per week for at least 30 minutes  BMI Counseling: Body mass index is 30 63 kg/m²  The BMI is above normal  Nutrition recommendations include decreasing portion sizes, consuming healthier snacks, moderation in carbohydrate intake and reducing intake of saturated and trans fat  Exercise recommendations include moderate physical activity 150 minutes/week and exercising 3-5 times per week  No follow-ups on file  Chief Complaint:     Chief Complaint   Patient presents with    Physical Exam      History of Present Illness:     Adult Annual Physical   Patient here for a comprehensive physical exam  The patient reports General malaise fogginess over the last several days with muscular and body aches some headache    Patient also wishes to review recent treatment and evaluation by allergist   He was referred by his pulmonologist to see an allergist   Immunology testing was performed by his allergist which resulted in low titers of pneumococcal antibodies which was unexpected given patient's prior history  Allergist has recommended immunization with pneumococcal vaccine as a diagnostic tool to be followed in 6 weeks by pneumococcal antibody titers to be drawn       Diet and Physical Activity  · Diet/Nutrition: well balanced diet, limited junk food and Hi protein including supplements  Daily protein intake it least 130 g per day      · Exercise: vigorous cardiovascular exercise and 3-4 times a week on average  Depression Screening  PHQ-9 Depression Screening    PHQ-9:    Frequency of the following problems over the past two weeks:            General Health  · Sleep: sleeps well and gets 4-6 hours of sleep on average  · Hearing: normal - bilateral   · Vision: no vision problems  · Dental: regular dental visits   Health  · Symptoms include: none     Review of Systems:     Review of Systems   Constitutional: Positive for fatigue  HENT: Negative  Negative for congestion, ear pain, hearing loss, nosebleeds, sore throat and trouble swallowing  Eyes: Negative  Respiratory: Negative for apnea, cough, chest tightness, shortness of breath and wheezing  Cardiovascular: Negative  Gastrointestinal: Negative for abdominal pain, blood in stool, constipation, diarrhea, nausea and vomiting  Endocrine: Negative  Genitourinary: Negative for difficulty urinating, dysuria, frequency, hematuria and urgency  Musculoskeletal: Negative for arthralgias, joint swelling and myalgias  Skin: Negative for rash  Neurological: Positive for light-headedness and headaches  Negative for dizziness, syncope and numbness  Hematological: Negative  Psychiatric/Behavioral: Negative for confusion, dysphoric mood and sleep disturbance  The patient is not nervous/anxious  Past Medical History:     History reviewed  No pertinent past medical history  Past Surgical History:     History reviewed   No pertinent surgical history  Family History:     History reviewed  No pertinent family history  Social History:     Social History     Socioeconomic History    Marital status: /Civil Union     Spouse name: None    Number of children: None    Years of education: None    Highest education level: None   Occupational History    None   Social Needs    Financial resource strain: None    Food insecurity:     Worry: None     Inability: None    Transportation needs:     Medical: None     Non-medical: None   Tobacco Use    Smoking status: Never Smoker    Smokeless tobacco: Never Used   Substance and Sexual Activity    Alcohol use:  Yes    Drug use: No    Sexual activity: Yes     Partners: Female   Lifestyle    Physical activity:     Days per week: None     Minutes per session: None    Stress: None   Relationships    Social connections:     Talks on phone: None     Gets together: None     Attends [employer] service: None     Active member of club or organization: None     Attends meetings of clubs or organizations: None     Relationship status: None    Intimate partner violence:     Fear of current or ex partner: None     Emotionally abused: None     Physically abused: None     Forced sexual activity: None   Other Topics Concern    None   Social History Narrative    None      Current Medications:     Current Outpatient Medications   Medication Sig Dispense Refill    Acai Berry 500 MG CAPS 2,000 mg      albuterol (2 5 mg/3 mL) 0 083 % nebulizer solution Inhale 1 each      aspirin 81 MG tablet Take 1 capsule by mouth daily      B-Complex-C CAPS take one cap by mouth once daily      Cholecalciferol (VITAMIN D3) 1000 units CAPS Take 1 tablet by mouth daily      co-enzyme Q-10 100 mg capsule Take 100 mg by mouth      montelukast (SINGULAIR) 10 mg tablet Take 1 tablet by mouth daily      Multiple Vitamins-Minerals (MULTIVITAMIN WITH MINERALS) tablet Take 1 tablet by mouth daily      Omega-3 Fatty Acids (FISH OIL) 1200 MG CAPS Take 1 capsule by mouth daily      Saccharomyces boulardii (PROBIOTIC) 250 MG CAPS Take by mouth      SYMBICORT 160-4 5 MCG/ACT inhaler INL 2 PUFFS PO Q 12 H  3    Turmeric (CURCUMIN 95) 500 MG CAPS Take 1 capsule by mouth      acyclovir (ZOVIRAX) 5 % ointment Apply topically 4 (four) times a day      fluticasone-salmeterol (ADVAIR DISKUS) 250-50 mcg/dose inhaler Inhale      fluticasone-salmeterol (ADVAIR DISKUS) 500-50 mcg/dose Inhale      methylPREDNISolone 4 MG tablet therapy pack Use as directed on package 21 each 0     No current facility-administered medications for this visit  Allergies: Allergies   Allergen Reactions    Bee Venom Other (See Comments)     swelling      Physical Exam:     /84   Pulse 72   Temp 97 6 °F (36 4 °C) (Tympanic)   Resp 16   Ht 6' 2 8" (1 9 m)   Wt 111 kg (243 lb 12 8 oz)   SpO2 95%   BMI 30 63 kg/m²     Physical Exam   Constitutional: He is oriented to person, place, and time  He appears well-developed and well-nourished  No distress  HENT:   Head: Normocephalic and atraumatic  Eyes: Pupils are equal, round, and reactive to light  Conjunctivae are normal  Right eye exhibits no discharge  Neck: Normal range of motion  No thyromegaly present  Cardiovascular: Normal rate and regular rhythm  Pulmonary/Chest: Effort normal and breath sounds normal  No respiratory distress  Lymphadenopathy:     He has no cervical adenopathy  Neurological: He is alert and oriented to person, place, and time  Skin: Skin is warm and dry  He is not diaphoretic  Psychiatric: He has a normal mood and affect  His behavior is normal  Judgment and thought content normal    Nursing note and vitals reviewed        Janet Crane DO  [facility] [address] [facility] [address]

## 2019-11-20 ENCOUNTER — TELEPHONE (OUTPATIENT)
Dept: FAMILY MEDICINE CLINIC | Facility: CLINIC | Age: 54
End: 2019-11-20

## 2019-11-20 NOTE — TELEPHONE ENCOUNTER
Patient notified and he will be coming in to sign the form  Please refax and copy to be scanned into chart

## 2019-11-20 NOTE — TELEPHONE ENCOUNTER
----- Message from Anthony Workman DO sent at 11/20/2019  9:33 AM EST -----  Please let patient know that his physical form can't be faxed because he did not sign it  If he would like to come in and sign it we can then fax it or he can fax it himself whichever he prefers  I will put form in my  been in the office

## 2019-12-31 ENCOUNTER — APPOINTMENT (OUTPATIENT)
Dept: LAB | Facility: MEDICAL CENTER | Age: 54
End: 2019-12-31
Payer: COMMERCIAL

## 2019-12-31 DIAGNOSIS — Z01.84 IMMUNITY STATUS TESTING: ICD-10-CM

## 2019-12-31 PROCEDURE — 86609 BACTERIUM ANTIBODY: CPT

## 2020-01-09 LAB
DEPRECATED S PNEUM14 IGG SER-MCNC: >17.4 UG/ML
DEPRECATED S PNEUM19 IGG SER-MCNC: 5.7 UG/ML
DEPRECATED S PNEUM23 IGG SER-MCNC: 1.2 UG/ML
DEPRECATED S PNEUM3 IGG SER-MCNC: 1.2 UG/ML
DEPRECATED S PNEUM4 IGG SER-MCNC: 0.5 UG/ML
DEPRECATED S PNEUM8 AB SER-MCNC: 2.4 UG/ML
DEPRECATED S PNEUM9 IGG SER-MCNC: 1.2 UG/ML
FLUBV RNA SPEC QL NAA+PROBE: 0.8 UG/ML
S PNEUM DA 18C IGG SER-MCNC: 1.4 UG/ML
S PNEUM DA 19A IGG SER-MCNC: 2.7 UG/ML
S PNEUM DA 6B IGG SER-MCNC: 1.6 UG/ML
STREP PNEUMO TYPE 1: 3.4 UG/ML
STREP PNEUMO TYPE 51: 1.8 UG/ML
STREP PNEUMO TYPE 68: 2.3 UG/ML

## 2020-01-13 ENCOUNTER — TELEPHONE (OUTPATIENT)
Dept: FAMILY MEDICINE CLINIC | Facility: CLINIC | Age: 55
End: 2020-01-13

## 2020-01-13 NOTE — TELEPHONE ENCOUNTER
Spoke with patient regarding results   I will fax results to [address] [facility] [phone]

## 2020-01-13 NOTE — TELEPHONE ENCOUNTER
----- Message from Javi Zarate DO sent at 1/10/2020 12:52 PM EST -----  Please let patient know that we received the results of his strep titers  His levels did go up after his shot but some of them are still below expected levels for adequate immunity  For full interpretation the test results he should check back with the allergist that requested it  We will fax copies of the results to her      (Dr Maty Rubio telephone [phone]) please send results from 2 months ago and from 10 days ago to Dr Maty Rubio

## 2020-02-06 ENCOUNTER — OFFICE VISIT (OUTPATIENT)
Dept: FAMILY MEDICINE CLINIC | Facility: CLINIC | Age: 55
End: 2020-02-06

## 2020-02-06 VITALS
DIASTOLIC BLOOD PRESSURE: 82 MMHG | SYSTOLIC BLOOD PRESSURE: 120 MMHG | BODY MASS INDEX: 31.06 KG/M2 | HEIGHT: 74 IN | WEIGHT: 242 LBS | HEART RATE: 90 BPM

## 2020-02-06 DIAGNOSIS — Z02.89 ENCOUNTER FOR FEDERAL AVIATION ADMINISTRATION (FAA) EXAMINATION: Primary | ICD-10-CM

## 2020-02-06 PROCEDURE — 99499 UNLISTED E&M SERVICE: CPT | Performed by: FAMILY MEDICINE

## 2020-02-06 PROCEDURE — 3008F BODY MASS INDEX DOCD: CPT | Performed by: FAMILY MEDICINE

## 2020-02-06 NOTE — PROGRESS NOTES
Chief Complaint   Patient presents with    Physical Exam     FAA 1st class GA#[id number] (NO EKG)

## 2020-07-16 ENCOUNTER — TELEPHONE (OUTPATIENT)
Dept: FAMILY MEDICINE CLINIC | Facility: CLINIC | Age: 55
End: 2020-07-16

## 2020-07-16 ENCOUNTER — CLINICAL SUPPORT (OUTPATIENT)
Dept: FAMILY MEDICINE CLINIC | Facility: CLINIC | Age: 55
End: 2020-07-16
Payer: COMMERCIAL

## 2020-07-16 VITALS — TEMPERATURE: 99.1 F

## 2020-07-16 DIAGNOSIS — Z23 NEED FOR PNEUMOCOCCAL VACCINATION: Primary | ICD-10-CM

## 2020-07-16 PROCEDURE — 90670 PCV13 VACCINE IM: CPT | Performed by: FAMILY MEDICINE

## 2020-07-16 PROCEDURE — 90471 IMMUNIZATION ADMIN: CPT | Performed by: FAMILY MEDICINE

## 2020-07-16 NOTE — TELEPHONE ENCOUNTER
Patient was here today and received Prevnar 13  He wants to know if he should have titer done in the future?

## 2020-07-16 NOTE — TELEPHONE ENCOUNTER
I would recommend that he check with his allergist on that to see what specific test they want and the timing of it

## 2020-11-04 ENCOUNTER — TELEPHONE (OUTPATIENT)
Dept: FAMILY MEDICINE CLINIC | Facility: CLINIC | Age: 55
End: 2020-11-04

## 2020-11-04 DIAGNOSIS — Z13.1 SCREENING FOR DIABETES MELLITUS: ICD-10-CM

## 2020-11-04 DIAGNOSIS — E78.2 MIXED HYPERLIPIDEMIA: Primary | ICD-10-CM

## 2020-11-04 DIAGNOSIS — Z12.5 SCREENING FOR PROSTATE CANCER: ICD-10-CM

## 2020-11-05 ENCOUNTER — APPOINTMENT (OUTPATIENT)
Dept: LAB | Facility: MEDICAL CENTER | Age: 55
End: 2020-11-05
Payer: COMMERCIAL

## 2020-11-05 ENCOUNTER — OFFICE VISIT (OUTPATIENT)
Dept: FAMILY MEDICINE CLINIC | Facility: CLINIC | Age: 55
End: 2020-11-05

## 2020-11-05 VITALS
SYSTOLIC BLOOD PRESSURE: 120 MMHG | HEART RATE: 78 BPM | BODY MASS INDEX: 29.34 KG/M2 | DIASTOLIC BLOOD PRESSURE: 78 MMHG | HEIGHT: 75 IN | WEIGHT: 236 LBS

## 2020-11-05 DIAGNOSIS — Z12.5 SCREENING FOR PROSTATE CANCER: ICD-10-CM

## 2020-11-05 DIAGNOSIS — Z02.89 ENCOUNTER FOR FEDERAL AVIATION ADMINISTRATION (FAA) EXAMINATION: Primary | ICD-10-CM

## 2020-11-05 DIAGNOSIS — E78.2 MIXED HYPERLIPIDEMIA: ICD-10-CM

## 2020-11-05 DIAGNOSIS — Z13.1 SCREENING FOR DIABETES MELLITUS: ICD-10-CM

## 2020-11-05 LAB
ALBUMIN SERPL BCP-MCNC: 4.1 G/DL (ref 3.5–5)
ALP SERPL-CCNC: 90 U/L (ref 46–116)
ALT SERPL W P-5'-P-CCNC: 49 U/L (ref 12–78)
ANION GAP SERPL CALCULATED.3IONS-SCNC: 2 MMOL/L (ref 4–13)
AST SERPL W P-5'-P-CCNC: 20 U/L (ref 5–45)
BILIRUB SERPL-MCNC: 0.69 MG/DL (ref 0.2–1)
BUN SERPL-MCNC: 21 MG/DL (ref 5–25)
CALCIUM SERPL-MCNC: 9.2 MG/DL (ref 8.3–10.1)
CHLORIDE SERPL-SCNC: 109 MMOL/L (ref 100–108)
CHOLEST SERPL-MCNC: 185 MG/DL (ref 50–200)
CO2 SERPL-SCNC: 28 MMOL/L (ref 21–32)
CREAT SERPL-MCNC: 0.94 MG/DL (ref 0.6–1.3)
EST. AVERAGE GLUCOSE BLD GHB EST-MCNC: 103 MG/DL
GFR SERPL CREATININE-BSD FRML MDRD: 91 ML/MIN/1.73SQ M
GLUCOSE P FAST SERPL-MCNC: 96 MG/DL (ref 65–99)
HBA1C MFR BLD: 5.2 %
HDLC SERPL-MCNC: 53 MG/DL
LDLC SERPL CALC-MCNC: 117 MG/DL (ref 0–100)
POTASSIUM SERPL-SCNC: 3.9 MMOL/L (ref 3.5–5.3)
PROT SERPL-MCNC: 7.4 G/DL (ref 6.4–8.2)
PSA SERPL-MCNC: 0.3 NG/ML (ref 0–4)
SODIUM SERPL-SCNC: 139 MMOL/L (ref 136–145)
TRIGL SERPL-MCNC: 73 MG/DL
TSH SERPL DL<=0.05 MIU/L-ACNC: 2.76 UIU/ML (ref 0.36–3.74)

## 2020-11-05 PROCEDURE — 80053 COMPREHEN METABOLIC PANEL: CPT

## 2020-11-05 PROCEDURE — 84443 ASSAY THYROID STIM HORMONE: CPT

## 2020-11-05 PROCEDURE — 99499 UNLISTED E&M SERVICE: CPT | Performed by: FAMILY MEDICINE

## 2020-11-05 PROCEDURE — 80061 LIPID PANEL: CPT

## 2020-11-05 PROCEDURE — 36415 COLL VENOUS BLD VENIPUNCTURE: CPT

## 2020-11-05 PROCEDURE — G0103 PSA SCREENING: HCPCS

## 2020-11-05 PROCEDURE — 83036 HEMOGLOBIN GLYCOSYLATED A1C: CPT

## 2020-11-05 PROCEDURE — 93000 ELECTROCARDIOGRAM COMPLETE: CPT | Performed by: FAMILY MEDICINE

## 2020-11-09 ENCOUNTER — OFFICE VISIT (OUTPATIENT)
Dept: FAMILY MEDICINE CLINIC | Facility: CLINIC | Age: 55
End: 2020-11-09
Payer: COMMERCIAL

## 2020-11-09 VITALS
TEMPERATURE: 98 F | WEIGHT: 238 LBS | SYSTOLIC BLOOD PRESSURE: 118 MMHG | HEIGHT: 75 IN | OXYGEN SATURATION: 98 % | DIASTOLIC BLOOD PRESSURE: 80 MMHG | BODY MASS INDEX: 29.59 KG/M2 | HEART RATE: 76 BPM

## 2020-11-09 DIAGNOSIS — Z00.00 ANNUAL PHYSICAL EXAM: Primary | ICD-10-CM

## 2020-11-09 PROCEDURE — 3008F BODY MASS INDEX DOCD: CPT | Performed by: FAMILY MEDICINE

## 2020-11-09 PROCEDURE — 99396 PREV VISIT EST AGE 40-64: CPT | Performed by: FAMILY MEDICINE

## 2020-11-09 PROCEDURE — 1036F TOBACCO NON-USER: CPT | Performed by: FAMILY MEDICINE

## 2020-11-09 RX ORDER — OMEPRAZOLE 40 MG/1
CAPSULE, DELAYED RELEASE ORAL
COMMUNITY
End: 2021-04-15

## 2020-11-09 RX ORDER — BUDESONIDE AND FORMOTEROL FUMARATE DIHYDRATE 160; 4.5 UG/1; UG/1
AEROSOL RESPIRATORY (INHALATION)
COMMUNITY
End: 2021-04-15

## 2020-11-09 RX ORDER — MELOXICAM 15 MG/1
TABLET ORAL
COMMUNITY
End: 2021-04-15

## 2020-11-11 ENCOUNTER — TELEPHONE (OUTPATIENT)
Dept: FAMILY MEDICINE CLINIC | Facility: CLINIC | Age: 55
End: 2020-11-11

## 2020-12-07 ENCOUNTER — TRANSCRIBE ORDERS (OUTPATIENT)
Dept: PHYSICAL THERAPY | Facility: MEDICAL CENTER | Age: 55
End: 2020-12-07

## 2020-12-07 ENCOUNTER — EVALUATION (OUTPATIENT)
Dept: PHYSICAL THERAPY | Facility: MEDICAL CENTER | Age: 55
End: 2020-12-07
Payer: COMMERCIAL

## 2020-12-07 DIAGNOSIS — M12.811 ROTATOR CUFF TEAR ARTHROPATHY OF RIGHT SHOULDER: ICD-10-CM

## 2020-12-07 DIAGNOSIS — M25.511 RIGHT SHOULDER PAIN, UNSPECIFIED CHRONICITY: Primary | ICD-10-CM

## 2020-12-07 DIAGNOSIS — M75.101 ROTATOR CUFF TEAR ARTHROPATHY OF RIGHT SHOULDER: ICD-10-CM

## 2020-12-07 PROCEDURE — 97161 PT EVAL LOW COMPLEX 20 MIN: CPT | Performed by: PHYSICAL THERAPIST

## 2020-12-07 PROCEDURE — 97140 MANUAL THERAPY 1/> REGIONS: CPT | Performed by: PHYSICAL THERAPIST

## 2020-12-10 ENCOUNTER — OFFICE VISIT (OUTPATIENT)
Dept: PHYSICAL THERAPY | Facility: MEDICAL CENTER | Age: 55
End: 2020-12-10
Payer: COMMERCIAL

## 2020-12-10 DIAGNOSIS — M12.811 ROTATOR CUFF TEAR ARTHROPATHY OF RIGHT SHOULDER: ICD-10-CM

## 2020-12-10 DIAGNOSIS — M25.511 RIGHT SHOULDER PAIN, UNSPECIFIED CHRONICITY: Primary | ICD-10-CM

## 2020-12-10 DIAGNOSIS — M75.101 ROTATOR CUFF TEAR ARTHROPATHY OF RIGHT SHOULDER: ICD-10-CM

## 2020-12-10 PROCEDURE — 97140 MANUAL THERAPY 1/> REGIONS: CPT | Performed by: PHYSICAL THERAPIST

## 2020-12-10 PROCEDURE — 97110 THERAPEUTIC EXERCISES: CPT | Performed by: PHYSICAL THERAPIST

## 2020-12-10 PROCEDURE — 97112 NEUROMUSCULAR REEDUCATION: CPT | Performed by: PHYSICAL THERAPIST

## 2020-12-11 ENCOUNTER — OFFICE VISIT (OUTPATIENT)
Dept: PHYSICAL THERAPY | Facility: MEDICAL CENTER | Age: 55
End: 2020-12-11
Payer: COMMERCIAL

## 2020-12-11 DIAGNOSIS — M75.101 ROTATOR CUFF TEAR ARTHROPATHY OF RIGHT SHOULDER: ICD-10-CM

## 2020-12-11 DIAGNOSIS — M25.511 RIGHT SHOULDER PAIN, UNSPECIFIED CHRONICITY: Primary | ICD-10-CM

## 2020-12-11 DIAGNOSIS — M12.811 ROTATOR CUFF TEAR ARTHROPATHY OF RIGHT SHOULDER: ICD-10-CM

## 2020-12-11 PROCEDURE — 97112 NEUROMUSCULAR REEDUCATION: CPT

## 2020-12-11 PROCEDURE — 97110 THERAPEUTIC EXERCISES: CPT

## 2020-12-11 PROCEDURE — 97140 MANUAL THERAPY 1/> REGIONS: CPT

## 2020-12-14 ENCOUNTER — OFFICE VISIT (OUTPATIENT)
Dept: PHYSICAL THERAPY | Facility: MEDICAL CENTER | Age: 55
End: 2020-12-14
Payer: COMMERCIAL

## 2020-12-14 DIAGNOSIS — M12.811 ROTATOR CUFF TEAR ARTHROPATHY OF RIGHT SHOULDER: ICD-10-CM

## 2020-12-14 DIAGNOSIS — M25.511 RIGHT SHOULDER PAIN, UNSPECIFIED CHRONICITY: Primary | ICD-10-CM

## 2020-12-14 DIAGNOSIS — M75.101 ROTATOR CUFF TEAR ARTHROPATHY OF RIGHT SHOULDER: ICD-10-CM

## 2020-12-14 PROCEDURE — 97110 THERAPEUTIC EXERCISES: CPT | Performed by: PHYSICAL THERAPIST

## 2020-12-14 PROCEDURE — 97112 NEUROMUSCULAR REEDUCATION: CPT | Performed by: PHYSICAL THERAPIST

## 2020-12-14 PROCEDURE — 97140 MANUAL THERAPY 1/> REGIONS: CPT | Performed by: PHYSICAL THERAPIST

## 2020-12-18 ENCOUNTER — OFFICE VISIT (OUTPATIENT)
Dept: PHYSICAL THERAPY | Facility: MEDICAL CENTER | Age: 55
End: 2020-12-18
Payer: COMMERCIAL

## 2020-12-18 DIAGNOSIS — M75.101 ROTATOR CUFF TEAR ARTHROPATHY OF RIGHT SHOULDER: ICD-10-CM

## 2020-12-18 DIAGNOSIS — M12.811 ROTATOR CUFF TEAR ARTHROPATHY OF RIGHT SHOULDER: ICD-10-CM

## 2020-12-18 DIAGNOSIS — M25.511 RIGHT SHOULDER PAIN, UNSPECIFIED CHRONICITY: Primary | ICD-10-CM

## 2020-12-18 PROCEDURE — 97140 MANUAL THERAPY 1/> REGIONS: CPT | Performed by: PHYSICAL THERAPIST

## 2020-12-18 PROCEDURE — 97110 THERAPEUTIC EXERCISES: CPT | Performed by: PHYSICAL THERAPIST

## 2020-12-18 PROCEDURE — 97112 NEUROMUSCULAR REEDUCATION: CPT | Performed by: PHYSICAL THERAPIST

## 2020-12-28 ENCOUNTER — OFFICE VISIT (OUTPATIENT)
Dept: PHYSICAL THERAPY | Facility: MEDICAL CENTER | Age: 55
End: 2020-12-28
Payer: COMMERCIAL

## 2020-12-28 DIAGNOSIS — M75.101 ROTATOR CUFF TEAR ARTHROPATHY OF RIGHT SHOULDER: ICD-10-CM

## 2020-12-28 DIAGNOSIS — M12.811 ROTATOR CUFF TEAR ARTHROPATHY OF RIGHT SHOULDER: ICD-10-CM

## 2020-12-28 DIAGNOSIS — M25.511 RIGHT SHOULDER PAIN, UNSPECIFIED CHRONICITY: Primary | ICD-10-CM

## 2020-12-28 PROCEDURE — 97140 MANUAL THERAPY 1/> REGIONS: CPT | Performed by: PHYSICAL THERAPIST

## 2020-12-28 PROCEDURE — 97110 THERAPEUTIC EXERCISES: CPT | Performed by: PHYSICAL THERAPIST

## 2020-12-28 PROCEDURE — 97112 NEUROMUSCULAR REEDUCATION: CPT | Performed by: PHYSICAL THERAPIST

## 2020-12-31 ENCOUNTER — OFFICE VISIT (OUTPATIENT)
Dept: PHYSICAL THERAPY | Facility: MEDICAL CENTER | Age: 55
End: 2020-12-31
Payer: COMMERCIAL

## 2020-12-31 DIAGNOSIS — M25.511 RIGHT SHOULDER PAIN, UNSPECIFIED CHRONICITY: Primary | ICD-10-CM

## 2020-12-31 DIAGNOSIS — M75.101 ROTATOR CUFF TEAR ARTHROPATHY OF RIGHT SHOULDER: ICD-10-CM

## 2020-12-31 DIAGNOSIS — M12.811 ROTATOR CUFF TEAR ARTHROPATHY OF RIGHT SHOULDER: ICD-10-CM

## 2020-12-31 PROCEDURE — 97140 MANUAL THERAPY 1/> REGIONS: CPT

## 2020-12-31 PROCEDURE — 97110 THERAPEUTIC EXERCISES: CPT

## 2020-12-31 PROCEDURE — 97112 NEUROMUSCULAR REEDUCATION: CPT

## 2021-01-14 ENCOUNTER — OFFICE VISIT (OUTPATIENT)
Dept: PHYSICAL THERAPY | Facility: MEDICAL CENTER | Age: 56
End: 2021-01-14
Payer: COMMERCIAL

## 2021-01-14 DIAGNOSIS — M25.511 RIGHT SHOULDER PAIN, UNSPECIFIED CHRONICITY: Primary | ICD-10-CM

## 2021-01-14 DIAGNOSIS — M12.811 ROTATOR CUFF TEAR ARTHROPATHY OF RIGHT SHOULDER: ICD-10-CM

## 2021-01-14 DIAGNOSIS — M75.101 ROTATOR CUFF TEAR ARTHROPATHY OF RIGHT SHOULDER: ICD-10-CM

## 2021-01-14 PROCEDURE — 97140 MANUAL THERAPY 1/> REGIONS: CPT | Performed by: PHYSICAL THERAPIST

## 2021-01-14 PROCEDURE — 97110 THERAPEUTIC EXERCISES: CPT | Performed by: PHYSICAL THERAPIST

## 2021-01-14 NOTE — PROGRESS NOTES
Daily Note     Today's date: 2021  Patient name: Eduardo Martinez  : 1965  MRN: [id number]  Referring provider: Mariel Kumar*  Dx:   Encounter Diagnosis     ICD-10-CM    1  Right shoulder pain, unspecified chronicity  M25 511    2  Rotator cuff tear arthropathy of right shoulder  M75 101     M12 811          Subjective: Pt reports that he was stuck in [address] after a trip  He did his exercises regularly, but feels like his ER ROM is getting stiff  Pt went to toss a shrimp to his cat and that caused shoulder pain  He notes that in some regards he has improved, but he still experiences sharp pain with dynamic movements  Objective: See treatment diary below      Assessment: Tolerated treatment fair  Patient demonstrated fatigue post treatment, exhibited good technique with therapeutic exercises and would benefit from continued PT  ER PROM improved post manual stretching  Plan: Continue per plan of care        Precautions: None      Manuals 12/7 12/10 12/11 12/14 12/18 12/28 12/31 1/14     Inf and post [facility] mobs  HK HK np HK HK HK  HK     PROM HK HK KO HK HK HK KO HK     AC jt mobs  HK np HK HK HK  HK                  Ther Ex 12/7 12/10 12/11 12/14 12/18 12/28 12/31 1/14     UBE NV 3F/3B 3F/3B 3F/3B 3F/3B 3F/3B 3F/3B 3F/3B     Scap 4 5"  x10 Red  3x10 Red  3x12 Red  3x15 Green  3x10 Green  3x12 Green  3x15 Green  3x15     UE alpha NV Sup  2X Sup  2x 3X Sup  3X  1# Sup  3x  1# Sup  3x  1# 3X  Sup  1#     Prone pro/ret NV 3x10 3x10 3x10 3x10 3x10 3x10 x30     SL ER NV 3x15 3x15 1#  3x15 2#  3x10 2#  3x10 2#  3x12 1#  3x15     Wall slides Sup  NV Sup  3x10 Sup  3x15 Sup  3x15 1#  3x10  sup 3x10  sup 3x10  Sup 3x10  sup

## 2021-01-22 ENCOUNTER — OFFICE VISIT (OUTPATIENT)
Dept: PHYSICAL THERAPY | Facility: MEDICAL CENTER | Age: 56
End: 2021-01-22
Payer: COMMERCIAL

## 2021-01-22 DIAGNOSIS — M75.101 ROTATOR CUFF TEAR ARTHROPATHY OF RIGHT SHOULDER: ICD-10-CM

## 2021-01-22 DIAGNOSIS — M25.511 RIGHT SHOULDER PAIN, UNSPECIFIED CHRONICITY: Primary | ICD-10-CM

## 2021-01-22 DIAGNOSIS — M12.811 ROTATOR CUFF TEAR ARTHROPATHY OF RIGHT SHOULDER: ICD-10-CM

## 2021-01-22 PROCEDURE — 97112 NEUROMUSCULAR REEDUCATION: CPT

## 2021-01-22 PROCEDURE — 97110 THERAPEUTIC EXERCISES: CPT

## 2021-01-22 PROCEDURE — 97140 MANUAL THERAPY 1/> REGIONS: CPT

## 2021-01-22 NOTE — PROGRESS NOTES
Daily Note     Today's date: 2021  Patient name: Godfrey Norman  : 1965  MRN: [id number]  Referring provider: Rahat Dutton  Dx:   Encounter Diagnosis     ICD-10-CM    1  Right shoulder pain, unspecified chronicity  M25 511    2  Rotator cuff tear arthropathy of right shoulder  M75 101     M12 811                   Subjective: Pt reports that his shoulder continues to be painful at times  Pt states that if he keeps it in a protected range in front of him, he does not have pain, but that it increases when reaching out to the side or behind him  Pt also notes that he has sleep disturbances due to pain, but is dependent on positioning  Objective: See treatment diary below      Assessment: Tolerated treatment well  Patient demonstrated fatigue post treatment, exhibited good technique with therapeutic exercises and would benefit from continued PT  Pain with PROM - all planes  Fair tolerance to TE as noted  Plan: Continue per plan of care        Precautions: None      Manuals 12/7 12/10 12/11 12/14 12/18 12/28 12/31 1/14 1/22    Inf and post [facility] mobs  HK HK np HK HK HK  HK     PROM HK HK KO HK HK HK KO HK KO    AC jt mobs  HK np HK HK HK  HK                  Ther Ex 12/7 12/10 12/11 12/14 12/18 12/28 12/31 1/14 1/22    UBE NV 3F/3B 3F/3B 3F/3B 3F/3B 3F/3B 3F/3B 3F/3B 3F/3B    Scap 4 5"  x10 Red  3x10 Red  3x12 Red  3x15 Green  3x10 Green  3x12 Green  3x15 Green  3x15 Green  3x15    UE alpha NV Sup  2X Sup  2x 3X Sup  3X  1# Sup  3x  1# Sup  3x  1# 3X  Sup  1# 3x  Sup  1#    Prone pro/ret NV 3x10 3x10 3x10 3x10 3x10 3x10 x30 x30    SL ER NV 3x15 3x15 1#  3x15 2#  3x10 2#  3x10 2#  3x12 1#  3x15 1#  3x15    Wall slides Sup  NV Sup  3x10 Sup  3x15 Sup  3x15 1#  3x10  sup 3x10  sup 3x10  Sup 3x10  sup 3x10  sup

## 2021-01-25 ENCOUNTER — APPOINTMENT (OUTPATIENT)
Dept: PHYSICAL THERAPY | Facility: MEDICAL CENTER | Age: 56
End: 2021-01-25
Payer: COMMERCIAL

## 2021-01-29 ENCOUNTER — OFFICE VISIT (OUTPATIENT)
Dept: PHYSICAL THERAPY | Facility: MEDICAL CENTER | Age: 56
End: 2021-01-29
Payer: COMMERCIAL

## 2021-01-29 DIAGNOSIS — M75.101 ROTATOR CUFF TEAR ARTHROPATHY OF RIGHT SHOULDER: ICD-10-CM

## 2021-01-29 DIAGNOSIS — M12.811 ROTATOR CUFF TEAR ARTHROPATHY OF RIGHT SHOULDER: ICD-10-CM

## 2021-01-29 DIAGNOSIS — M25.511 RIGHT SHOULDER PAIN, UNSPECIFIED CHRONICITY: Primary | ICD-10-CM

## 2021-01-29 PROCEDURE — 97110 THERAPEUTIC EXERCISES: CPT

## 2021-01-29 PROCEDURE — 97140 MANUAL THERAPY 1/> REGIONS: CPT

## 2021-01-29 PROCEDURE — 97112 NEUROMUSCULAR REEDUCATION: CPT

## 2021-01-29 NOTE — PROGRESS NOTES
Daily Note     Today's date: 2021  Patient name: Godfrey Norman  : 1965  MRN: [id number]  Referring provider: Ether Sicard*  Dx:   Encounter Diagnosis     ICD-10-CM    1  Right shoulder pain, unspecified chronicity  M25 511    2  Rotator cuff tear arthropathy of right shoulder  M75 101     M12 811                   Subjective: Pt reports that his shoulder remains painful in certain positions and was more sore yesterday for some reason  Objective: See treatment diary below      Assessment: Tolerated treatment fairly well  Patient would benefit from continued PT and will have one more PT appointment prior to shoulder surgery  Plan: Continue per plan of care        Precautions: None      Manuals 12/7 12/10 12/11 12/14 12/18 12/28 12/31 1/14 1/22 1/29   Inf and post [address] mobs  HK HK np HK HK HK  HK     PROM HK HK KO HK HK HK KO HK KO KO   AC jt mobs  HK np HK HK HK  HK                  Ther Ex 12/7 12/10 12/11 12/14 12/18 12/28 12/31 1/14 1/22 1/29   UBE NV 3F/3B 3F/3B 3F/3B 3F/3B 3F/3B 3F/3B 3F/3B 3F/3B 3F/3B   Scap 4 5"  x10 Red  3x10 Red  3x12 Red  3x15 Green  3x10 Green  3x12 Green  3x15 Green  3x15 Green  3x15 Green  3x15   UE alpha NV Sup  2X Sup  2x 3X Sup  3X  1# Sup  3x  1# Sup  3x  1# 3X  Sup  1# 3x  Sup  1# 3x  Sup  1#   Prone pro/ret NV 3x10 3x10 3x10 3x10 3x10 3x10 x30 x30 x30   SL ER NV 3x15 3x15 1#  3x15 2#  3x10 2#  3x10 2#  3x12 1#  3x15 1#  3x15 1#  3x15   Wall slides Sup  NV Sup  3x10 Sup  3x15 Sup  3x15 1#  3x10  sup 3x10  sup 3x10  Sup 3x10  sup 3x10  sup 3x10  sup

## 2021-02-05 ENCOUNTER — APPOINTMENT (OUTPATIENT)
Dept: PHYSICAL THERAPY | Facility: MEDICAL CENTER | Age: 56
End: 2021-02-05
Payer: COMMERCIAL

## 2021-02-12 ENCOUNTER — OFFICE VISIT (OUTPATIENT)
Dept: PHYSICAL THERAPY | Facility: MEDICAL CENTER | Age: 56
End: 2021-02-12
Payer: COMMERCIAL

## 2021-02-12 DIAGNOSIS — M12.811 ROTATOR CUFF TEAR ARTHROPATHY OF RIGHT SHOULDER: ICD-10-CM

## 2021-02-12 DIAGNOSIS — M75.101 ROTATOR CUFF TEAR ARTHROPATHY OF RIGHT SHOULDER: ICD-10-CM

## 2021-02-12 DIAGNOSIS — M25.511 RIGHT SHOULDER PAIN, UNSPECIFIED CHRONICITY: Primary | ICD-10-CM

## 2021-02-12 PROCEDURE — 97140 MANUAL THERAPY 1/> REGIONS: CPT

## 2021-02-12 PROCEDURE — 97112 NEUROMUSCULAR REEDUCATION: CPT

## 2021-02-12 PROCEDURE — 97110 THERAPEUTIC EXERCISES: CPT

## 2021-02-12 NOTE — PROGRESS NOTES
Daily Note     Today's date: 2021  Patient name: Lori Rodgers  : 1965  MRN: [id number]  Referring provider: Jimmy Veliz  Dx:   Encounter Diagnosis     ICD-10-CM    1  Right shoulder pain, unspecified chronicity  M25 511    2  Rotator cuff tear arthropathy of right shoulder  M75 101     M12 811                   Subjective: Pt reports that his UT and the back of his shoulder is more sore today 2* doing some training yesterday for work  Objective: See treatment diary below      Assessment: Tolerated treatment well  Patient demonstrated fatigue post treatment, exhibited good technique with therapeutic exercises and would benefit from continued PT  Pt is scheduled for RTC surgery next week  Plan: Continue per plan of care        Precautions: None      Manuals             Inf and post [facility] mobs              PROM KO            AC jt mobs                          Ther Ex             UBE 3F/3B            Scap 4 Green  3x15            UE alpha 1#  3x  sup            Prone pro/ret x30            SL ER 1#  3x15            Wall slides Sup  3x10                                                                                                                                                                                                                                         MHP 15'

## 2021-02-26 ENCOUNTER — OFFICE VISIT (OUTPATIENT)
Dept: PHYSICAL THERAPY | Facility: MEDICAL CENTER | Age: 56
End: 2021-02-26
Payer: COMMERCIAL

## 2021-02-26 ENCOUNTER — TRANSCRIBE ORDERS (OUTPATIENT)
Dept: PHYSICAL THERAPY | Facility: MEDICAL CENTER | Age: 56
End: 2021-02-26

## 2021-02-26 DIAGNOSIS — M25.511 RIGHT SHOULDER PAIN, UNSPECIFIED CHRONICITY: Primary | ICD-10-CM

## 2021-02-26 DIAGNOSIS — M75.101 ROTATOR CUFF TEAR ARTHROPATHY OF RIGHT SHOULDER: ICD-10-CM

## 2021-02-26 DIAGNOSIS — M12.811 ROTATOR CUFF TEAR ARTHROPATHY OF RIGHT SHOULDER: ICD-10-CM

## 2021-02-26 PROCEDURE — 97161 PT EVAL LOW COMPLEX 20 MIN: CPT | Performed by: PHYSICAL THERAPIST

## 2021-02-26 PROCEDURE — 97140 MANUAL THERAPY 1/> REGIONS: CPT | Performed by: PHYSICAL THERAPIST

## 2021-02-26 NOTE — PROGRESS NOTES
PT Evaluation     Today's date: 2021  Patient name: Yadira Mederos  : 1965  MRN: [id number]  Referring provider: Dina Muniz  Dx:   Encounter Diagnosis     ICD-10-CM    1  Right shoulder pain, unspecified chronicity  M25 511    2  Rotator cuff tear arthropathy of right shoulder  M75 101     M12 811                   Assessment  Assessment details: Pt is a pleasant 65 yo male presenting to physical therapy s/p R shoulder RC repair  Pt would benefit from skilled PT to address current impairments and return pt to pre-morbid function  Understanding of Dx/Px/POC: good   Prognosis: good    Goals  Impairment Goals  - Pt I with initial HEP in 1-2 visits  - Improve ROM equal to contralateral side in 6-8 weeks  - Increase strength to 5/5 in all affected areas in 8+ weeks    Functional Goals  - Increase FOTO to at least 64 in 12+ weeks  - Patient will be independent with comprehensive HEP in 6-8 weeks  - Patient will be able to reach for object from shelf at shoulder height with min reports of difficulty in 6-8 weeks  - Patient will be able to reach for object overhead with min to difficulty in 8-12 weeks  - Patient will be able to lift/carry objects without provocation of symptoms in 12+ weeks  - Patient will be able to return to pre-morbid activity level with min difficulty or discomfort in 12+ weeks          Plan  Patient would benefit from: skilled physical therapy  Other planned modality interventions: Modalites prn   Planned therapy interventions: manual therapy, neuromuscular re-education, patient education, strengthening, stretching, therapeutic activities, therapeutic exercise, postural training and home exercise program  Frequency: 2x week  Duration in weeks: 12  Treatment plan discussed with: patient        Subjective Evaluation    History of Present Illness  Date of surgery: 2021  Mechanism of injury: Pt failed conservative care and decided to proceed with surgery    2021 he underwent a R shoulder RC repair  He has been wearing his sling regularly and doing the exercises given to him by his surgeon  Pain  Current pain ratin  At best pain ratin  At worst pain ratin    Social Support    Employment status: not working ()  Exercise history: Wt lifting, exercise, softball    Patient Goals  Patient goals for therapy: decreased pain, increased motion, increased strength, return to sport/leisure activities, independence with ADLs/IADLs and return to work          Objective     Observations     Additional Observation Details  Pt presents to physical therapy wearing a sling on his R shoulder  Surgical wounds are healing well    They are closed with suture and there is no sign of infection     - Swelling R shoulder    Neurological Testing     Sensation     Shoulder   Left Shoulder   Intact: light touch    Right Shoulder   Intact: light touch    Active Range of Motion   Left Shoulder   Normal active range of motion    Additional Active Range of Motion Details  R shoulder NT secondary to recency of surgery    Passive Range of Motion   Left Shoulder   Normal passive range of motion    Right Shoulder   Flexion: 30 degrees   Abduction: 45 degrees   External rotation 0°: 5 degrees   Internal rotation 0°: 20 degrees     Strength/Myotome Testing     Left Shoulder   Normal muscle strength    Additional Strength Details  R shoulder NT secondary to recency of surgery               Precautions: s/p R shoulder RC repair      Manuals             PROM HK                                                   Ther Ex             HEP review  HK                                      Modalities              PRN

## 2021-02-26 NOTE — LETTER
2021    Catrachito Penn MD  [address] [address]    Patient: Enrico Goins   YOB: 1965   Date of Visit: 2021     Encounter Diagnosis     ICD-10-CM    1  Right shoulder pain, unspecified chronicity  M25 511    2  Rotator cuff tear arthropathy of right shoulder  M75 101     M12 811        Dear Dr Rebeca Marie:    Thank you for your recent referral of Enrico Goins  Please review the attached evaluation summary from Filipe's recent visit  Please verify that you agree with the plan of care by signing the attached order  If you have any questions or concerns, please do not hesitate to call  I sincerely appreciate the opportunity to share in the care of one of your patients and hope to have another opportunity to work with you in the near future  Sincerely,    Ricky Cummings, PT      Referring Provider:      I certify that I have read the below Plan of Care and certify the need for these services furnished under this plan of treatment while under my care  Catrachito Penn MD  [address] [facility]  Via Fax: [phone]          PT Evaluation     Today's date: 2021  Patient name: Enrico Goins  : 1965  MRN: [id number]  Referring provider: Jim Reddy*  Dx:   Encounter Diagnosis     ICD-10-CM    1  Right shoulder pain, unspecified chronicity  M25 511    2  Rotator cuff tear arthropathy of right shoulder  M75 101     M12 811                   Assessment  Assessment details: Pt is a pleasant 63 yo male presenting to physical therapy s/p R shoulder RC repair  Pt would benefit from skilled PT to address current impairments and return pt to pre-morbid function      Understanding of Dx/Px/POC: good   Prognosis: good    Goals  Impairment Goals  - Pt I with initial HEP in 1-2 visits  - Improve ROM equal to contralateral side in 6-8 weeks  - Increase strength to 5/5 in all affected areas in 8+ weeks    Functional Goals  - Increase FOTO to at least 64 in 12+ weeks  - Patient will be independent with comprehensive HEP in 6-8 weeks  - Patient will be able to reach for object from shelf at shoulder height with min reports of difficulty in 6-8 weeks  - Patient will be able to reach for object overhead with min to difficulty in 8-12 weeks  - Patient will be able to lift/carry objects without provocation of symptoms in 12+ weeks  - Patient will be able to return to pre-morbid activity level with min difficulty or discomfort in 12+ weeks          Plan  Patient would benefit from: skilled physical therapy  Other planned modality interventions: Modalites prn   Planned therapy interventions: manual therapy, neuromuscular re-education, patient education, strengthening, stretching, therapeutic activities, therapeutic exercise, postural training and home exercise program  Frequency: 2x week  Duration in weeks: 12  Treatment plan discussed with: patient        Subjective Evaluation    History of Present Illness  Date of surgery: 2021  Mechanism of injury: Pt failed conservative care and decided to proceed with surgery  2021 he underwent a R shoulder RC repair  He has been wearing his sling regularly and doing the exercises given to him by his surgeon  Pain  Current pain ratin  At best pain ratin  At worst pain ratin    Social Support    Employment status: not working ()  Exercise history: Wt lifting, exercise, softball    Patient Goals  Patient goals for therapy: decreased pain, increased motion, increased strength, return to sport/leisure activities, independence with ADLs/IADLs and return to work          Objective     Observations     Additional Observation Details  Pt presents to physical therapy wearing a sling on his R shoulder  Surgical wounds are healing well    They are closed with suture and there is no sign of infection     - Swelling R shoulder    Neurological Testing Sensation     Shoulder   Left Shoulder   Intact: light touch    Right Shoulder   Intact: light touch    Active Range of Motion   Left Shoulder   Normal active range of motion    Additional Active Range of Motion Details  R shoulder NT secondary to recency of surgery    Passive Range of Motion   Left Shoulder   Normal passive range of motion    Right Shoulder   Flexion: 30 degrees   Abduction: 45 degrees   External rotation 0°: 5 degrees   Internal rotation 0°: 20 degrees     Strength/Myotome Testing     Left Shoulder   Normal muscle strength    Additional Strength Details  R shoulder NT secondary to recency of surgery               Precautions: s/p R shoulder RC repair      Manuals 2/26            PROM HK                                                   Ther Ex 2/26            HEP review  HK                                      Modalities 2/26             PRN

## 2021-03-01 ENCOUNTER — OFFICE VISIT (OUTPATIENT)
Dept: PHYSICAL THERAPY | Facility: MEDICAL CENTER | Age: 56
End: 2021-03-01
Payer: COMMERCIAL

## 2021-03-01 DIAGNOSIS — M75.101 ROTATOR CUFF TEAR ARTHROPATHY OF RIGHT SHOULDER: ICD-10-CM

## 2021-03-01 DIAGNOSIS — M25.511 RIGHT SHOULDER PAIN, UNSPECIFIED CHRONICITY: Primary | ICD-10-CM

## 2021-03-01 DIAGNOSIS — M12.811 ROTATOR CUFF TEAR ARTHROPATHY OF RIGHT SHOULDER: ICD-10-CM

## 2021-03-01 PROCEDURE — 97140 MANUAL THERAPY 1/> REGIONS: CPT | Performed by: PHYSICAL THERAPIST

## 2021-03-01 NOTE — PROGRESS NOTES
Daily Note     Today's date: 3/1/2021  Patient name: Steve Diaz  : 1965  MRN: [id number]  Referring provider: Andreia Corea*  Dx:   Encounter Diagnosis     ICD-10-CM    1  Right shoulder pain, unspecified chronicity  M25 511    2  Rotator cuff tear arthropathy of right shoulder  M75 101     M12 811          Subjective: Pt reports that his shoulder is feeling pretty good and the pain is not that bad  He doesn't feel like he is getting much movement with the pendulums  Objective: See treatment diary below      Assessment: Tolerated treatment fair  Patient would benefit from continued PT  PROM improved from last visit  Plan: Continue per plan of care        Precautions: s/p R shoulder RC repair      Manuals 2/26 3/1           PROM HK HK                                                  Ther Ex             HEP review  HK                                      Modalities 2/26 3/1           Ice PRN 15'

## 2021-03-04 ENCOUNTER — OFFICE VISIT (OUTPATIENT)
Dept: PHYSICAL THERAPY | Facility: MEDICAL CENTER | Age: 56
End: 2021-03-04
Payer: COMMERCIAL

## 2021-03-04 DIAGNOSIS — M25.511 RIGHT SHOULDER PAIN, UNSPECIFIED CHRONICITY: Primary | ICD-10-CM

## 2021-03-04 DIAGNOSIS — M75.101 ROTATOR CUFF TEAR ARTHROPATHY OF RIGHT SHOULDER: ICD-10-CM

## 2021-03-04 DIAGNOSIS — M12.811 ROTATOR CUFF TEAR ARTHROPATHY OF RIGHT SHOULDER: ICD-10-CM

## 2021-03-04 PROCEDURE — 97140 MANUAL THERAPY 1/> REGIONS: CPT | Performed by: PHYSICAL THERAPIST

## 2021-03-04 NOTE — PROGRESS NOTES
Daily Note     Today's date: 3/4/2021  Patient name: Pollo Garcia  : 1965  MRN: [id number]  Referring provider: Leo Jain*  Dx:   Encounter Diagnosis     ICD-10-CM    1  Right shoulder pain, unspecified chronicity  M25 511    2  Rotator cuff tear arthropathy of right shoulder  M75 101     M12 811          Subjective: Pt reports that his shoulder is doing pretty well  He is not having significant pain on a regular basis  Objective: See treatment diary below      Assessment: Tolerated treatment well  Patient would benefit from continued PT  ER is the most limited motion  Plan: Continue per plan of care        Precautions: s/p R shoulder RC repair      Manuals 2/26 3/1 3/4          PROM HK HK HK                                                 Ther Ex             HEP review  HK                                      Modalities 2/26 3/1 3/4          Ice PRN 15' 15'

## 2021-03-08 ENCOUNTER — OFFICE VISIT (OUTPATIENT)
Dept: PHYSICAL THERAPY | Facility: MEDICAL CENTER | Age: 56
End: 2021-03-08
Payer: COMMERCIAL

## 2021-03-08 DIAGNOSIS — M75.101 ROTATOR CUFF TEAR ARTHROPATHY OF RIGHT SHOULDER: ICD-10-CM

## 2021-03-08 DIAGNOSIS — M25.511 RIGHT SHOULDER PAIN, UNSPECIFIED CHRONICITY: Primary | ICD-10-CM

## 2021-03-08 DIAGNOSIS — M12.811 ROTATOR CUFF TEAR ARTHROPATHY OF RIGHT SHOULDER: ICD-10-CM

## 2021-03-08 PROCEDURE — 97140 MANUAL THERAPY 1/> REGIONS: CPT | Performed by: PHYSICAL THERAPIST

## 2021-03-08 NOTE — PROGRESS NOTES
Daily Note     Today's date: 3/8/2021  Patient name: Yadira Mederos  : 1965  MRN: [id number]  Referring provider: Dina Muniz*  Dx:   Encounter Diagnosis     ICD-10-CM    1  Right shoulder pain, unspecified chronicity  M25 511    2  Rotator cuff tear arthropathy of right shoulder  M75 101     M12 811          Subjective:  Pt reports that he had increased discomfort over the weekend for no apparent reason  Objective: See treatment diary below      Assessment: Tolerated treatment fair  Patient would benefit from continued PT  Pts PROM is slowly improving  Pt demonstrated improved PROM today vs last visit  Plan: Continue per plan of care        Precautions: s/p R shoulder RC repair      Manuals 2/26 3/1 3/4 3/8         PROM HK HK HK HK                                                Ther Ex             HEP review  HK                                      Modalities 2/26 3/1 3/4 3/8         Ice PRN 15' 15' NP         MH    15'

## 2021-03-11 ENCOUNTER — OFFICE VISIT (OUTPATIENT)
Dept: PHYSICAL THERAPY | Facility: MEDICAL CENTER | Age: 56
End: 2021-03-11
Payer: COMMERCIAL

## 2021-03-11 DIAGNOSIS — M25.511 RIGHT SHOULDER PAIN, UNSPECIFIED CHRONICITY: Primary | ICD-10-CM

## 2021-03-11 DIAGNOSIS — M12.811 ROTATOR CUFF TEAR ARTHROPATHY OF RIGHT SHOULDER: ICD-10-CM

## 2021-03-11 DIAGNOSIS — M75.101 ROTATOR CUFF TEAR ARTHROPATHY OF RIGHT SHOULDER: ICD-10-CM

## 2021-03-11 PROCEDURE — 97140 MANUAL THERAPY 1/> REGIONS: CPT | Performed by: PHYSICAL THERAPIST

## 2021-03-11 NOTE — PROGRESS NOTES
Daily Note     Today's date: 3/11/2021  Patient name: Shweta Tran  : 1965  MRN: [id number]  Referring provider: Latasha Scott*  Dx:   Encounter Diagnosis     ICD-10-CM    1  Right shoulder pain, unspecified chronicity  M25 511    2  Rotator cuff tear arthropathy of right shoulder  M75 101     M12 811          Subjective: Pt reports that his shoulder is feeling better than last visit  Objective: See treatment diary below      Assessment: Tolerated treatment fair  Patient would benefit from continued PT  Pt did well with table slide flexion  Pt would benefit from continuing with table slide flexion at home to help increase ROM  Plan: Continue per plan of care        Precautions: s/p R shoulder RC repair      Manuals 2/26 3/1 3/4 3/8 3/11        PROM HK HK HK HK HK                                               Ther Ex 2/26    3/11        HEP review  HK            Table slide flexion     HK                     Modalities 2/26 3/1 3/4 3/8 3/11        Ice PRN 15' 13' NP         MH    15' 15'

## 2021-03-15 ENCOUNTER — OFFICE VISIT (OUTPATIENT)
Dept: PHYSICAL THERAPY | Facility: MEDICAL CENTER | Age: 56
End: 2021-03-15
Payer: COMMERCIAL

## 2021-03-15 DIAGNOSIS — M25.511 RIGHT SHOULDER PAIN, UNSPECIFIED CHRONICITY: Primary | ICD-10-CM

## 2021-03-15 DIAGNOSIS — M12.811 ROTATOR CUFF TEAR ARTHROPATHY OF RIGHT SHOULDER: ICD-10-CM

## 2021-03-15 DIAGNOSIS — M75.101 ROTATOR CUFF TEAR ARTHROPATHY OF RIGHT SHOULDER: ICD-10-CM

## 2021-03-15 PROCEDURE — 97140 MANUAL THERAPY 1/> REGIONS: CPT | Performed by: PHYSICAL THERAPIST

## 2021-03-15 PROCEDURE — 97110 THERAPEUTIC EXERCISES: CPT | Performed by: PHYSICAL THERAPIST

## 2021-03-15 NOTE — PROGRESS NOTES
Daily Note     Today's date: 3/15/2021  Patient name: Juanita Bowles  : 1965  MRN: [id number]  Referring provider: Roya Frankel*  Dx:   Encounter Diagnosis     ICD-10-CM    1  Right shoulder pain, unspecified chronicity  M25 511    2  Rotator cuff tear arthropathy of right shoulder  M75 101     M12 811          Subjective: Pt reports that his table slides went ok, but he occasionally had some pain when he did them  Objective: See treatment diary below      Assessment: Tolerated treatment fair  Patient would benefit from continued PT  Pt would benefit from doing table slide flexion and ER at home to improve his PROM  Plan: Continue per plan of care        Precautions: s/p R shoulder RC repair      Manuals 2/26 3/1 3/4 3/8 3/11 3/15       PROM HK HK HK HK HK HK                                              Ther Ex 2/26    3/11 3/15       HEP review  HK            Table slide flexion     x30 flexion and ER  x30                    Modalities 2/26 3/1 3/4 3/8 3/11 3/15       Ice PRN 15' 15' NP         MH    15' 15' 15'

## 2021-03-18 ENCOUNTER — OFFICE VISIT (OUTPATIENT)
Dept: PHYSICAL THERAPY | Facility: MEDICAL CENTER | Age: 56
End: 2021-03-18
Payer: COMMERCIAL

## 2021-03-18 DIAGNOSIS — M12.811 ROTATOR CUFF TEAR ARTHROPATHY OF RIGHT SHOULDER: ICD-10-CM

## 2021-03-18 DIAGNOSIS — M25.511 RIGHT SHOULDER PAIN, UNSPECIFIED CHRONICITY: Primary | ICD-10-CM

## 2021-03-18 DIAGNOSIS — M75.101 ROTATOR CUFF TEAR ARTHROPATHY OF RIGHT SHOULDER: ICD-10-CM

## 2021-03-18 PROCEDURE — 97140 MANUAL THERAPY 1/> REGIONS: CPT | Performed by: PHYSICAL THERAPIST

## 2021-03-18 PROCEDURE — 97110 THERAPEUTIC EXERCISES: CPT | Performed by: PHYSICAL THERAPIST

## 2021-03-18 NOTE — PROGRESS NOTES
Daily Note     Today's date: 3/18/2021  Patient name: Feroz Steinberg  : 1965  MRN: [id number]  Referring provider: Leisa Richmond*  Dx:   Encounter Diagnosis     ICD-10-CM    1  Right shoulder pain, unspecified chronicity  M25 511    2  Rotator cuff tear arthropathy of right shoulder  M75 101     M12 811          Subjective: Pt reports that his shoulder has been very sore the past few days  Objective: See treatment diary below      Assessment: Tolerated treatment fair  Patient would benefit from continued PT  PROM remains stiff, but slowly improving  Plan: Continue per plan of care        Precautions: s/p R shoulder RC repair      Manuals 2/26 3/1 3/4 3/8 3/11 3/15 3/18      PROM HK HK HK HK HK HK HK                                             Ther Ex 2/26    3/11 3/15 3/18      HEP review  HK            Table slide flexion     x30 flexion and ER  x30 x30      Pulleys        6'      UBE       3f/3b                   Modalities 2/26 3/1 3/4 3/8 3/11 3/15 3/18      Ice PRN 15' 15' NP         MH    15' 15' 15' 15'

## 2021-03-22 ENCOUNTER — OFFICE VISIT (OUTPATIENT)
Dept: PHYSICAL THERAPY | Facility: MEDICAL CENTER | Age: 56
End: 2021-03-22
Payer: COMMERCIAL

## 2021-03-22 DIAGNOSIS — M12.811 ROTATOR CUFF TEAR ARTHROPATHY OF RIGHT SHOULDER: ICD-10-CM

## 2021-03-22 DIAGNOSIS — M75.101 ROTATOR CUFF TEAR ARTHROPATHY OF RIGHT SHOULDER: ICD-10-CM

## 2021-03-22 DIAGNOSIS — M25.511 RIGHT SHOULDER PAIN, UNSPECIFIED CHRONICITY: Primary | ICD-10-CM

## 2021-03-22 PROCEDURE — 97140 MANUAL THERAPY 1/> REGIONS: CPT | Performed by: PHYSICAL THERAPIST

## 2021-03-22 PROCEDURE — 97110 THERAPEUTIC EXERCISES: CPT | Performed by: PHYSICAL THERAPIST

## 2021-03-22 NOTE — PROGRESS NOTES
Daily Note     Today's date: 3/22/2021  Patient name: Max Baez  : 1965  MRN: [id number]  Referring provider: Nhung Francois  Dx:   Encounter Diagnosis     ICD-10-CM    1  Right shoulder pain, unspecified chronicity  M25 511    2  Rotator cuff tear arthropathy of right shoulder  M75 101     M12 811          Subjective: Pt reports that his shoulder was sore after walking 6 miles on Saturday  Objective: See treatment diary below      Assessment: Tolerated treatment well  Patient exhibited good technique with therapeutic exercises and would benefit from continued PT  PROM is slowly improving  Pt would benefit from doing his HEP regularly to work on improving PROM  Plan: Continue per plan of care        Precautions: s/p R shoulder RC repair      Manuals 2/26 3/1 3/4 3/8 3/11 3/15 3/18 3/22     PROM HK HK HK HK HK HK HK HK     GH jt mobs        HK                               Ther Ex 2/26    3/11 3/15 3/18 3/22     HEP review  HK            Table slide flexion     x30 flexion and ER  x30 x30 x30     Pulleys        6' 6'     UBE       3f/3b 3F/3B                  Modalities 2/26 3/1 3/4 3/8 3/11 3/15 3/18 3/22     Ice PRN 15' 15' NP         MH    15' 15' 15' 15' 15'

## 2021-03-25 ENCOUNTER — OFFICE VISIT (OUTPATIENT)
Dept: PHYSICAL THERAPY | Facility: MEDICAL CENTER | Age: 56
End: 2021-03-25
Payer: COMMERCIAL

## 2021-03-25 DIAGNOSIS — M25.511 RIGHT SHOULDER PAIN, UNSPECIFIED CHRONICITY: Primary | ICD-10-CM

## 2021-03-25 DIAGNOSIS — M75.101 ROTATOR CUFF TEAR ARTHROPATHY OF RIGHT SHOULDER: ICD-10-CM

## 2021-03-25 DIAGNOSIS — M12.811 ROTATOR CUFF TEAR ARTHROPATHY OF RIGHT SHOULDER: ICD-10-CM

## 2021-03-25 PROCEDURE — 97110 THERAPEUTIC EXERCISES: CPT | Performed by: PHYSICAL THERAPIST

## 2021-03-25 PROCEDURE — 97140 MANUAL THERAPY 1/> REGIONS: CPT | Performed by: PHYSICAL THERAPIST

## 2021-03-25 NOTE — PROGRESS NOTES
Daily Note     Today's date: 3/25/2021  Patient name: Marcia Freeman  : 1965  MRN: [id number]  Referring provider: Bryan Alberto*  Dx:   Encounter Diagnosis     ICD-10-CM    1  Right shoulder pain, unspecified chronicity  M25 511    2  Rotator cuff tear arthropathy of right shoulder  M75 101     M12 811          Subjective: Pt reports that he cannot get his pulleys set up to replicate the same position as he is in when in the office  Objective: See treatment diary below      Assessment: Tolerated treatment well  Patient demonstrated fatigue post treatment and would benefit from continued PT  PROM much improved today  Pts ROM remains tight, but is improving  Plan: Continue per plan of care        Precautions: s/p R shoulder RC repair      Manuals 2/26 3/1 3/4 3/8 3/11 3/15 3/18 3/22 3/25    PROM HK HK HK HK HK HK HK HK HK    GH jt mobs        HK HK                              Ther Ex 2/26    3/11 3/15 3/18 3/22 3/25    HEP review  HK            Table slide flexion     x30 flexion and ER  x30 x30 x30 x30    Pulleys        6' 6' 6'    UBE       3f/3b 3F/3B 3F/3B    Scap 4         5"  x30                              Modalities 2/26 3/1 3/4 3/8 3/11 3/15 3/18 3/22 3/25    Ice PRN 15' 15' NP         MH    15' 15' 15' 15' 15' 15'

## 2021-03-29 ENCOUNTER — OFFICE VISIT (OUTPATIENT)
Dept: PHYSICAL THERAPY | Facility: MEDICAL CENTER | Age: 56
End: 2021-03-29
Payer: COMMERCIAL

## 2021-03-29 DIAGNOSIS — M25.511 RIGHT SHOULDER PAIN, UNSPECIFIED CHRONICITY: Primary | ICD-10-CM

## 2021-03-29 DIAGNOSIS — M12.811 ROTATOR CUFF TEAR ARTHROPATHY OF RIGHT SHOULDER: ICD-10-CM

## 2021-03-29 DIAGNOSIS — M75.101 ROTATOR CUFF TEAR ARTHROPATHY OF RIGHT SHOULDER: ICD-10-CM

## 2021-03-29 PROCEDURE — 97110 THERAPEUTIC EXERCISES: CPT | Performed by: PHYSICAL THERAPIST

## 2021-03-29 PROCEDURE — 97140 MANUAL THERAPY 1/> REGIONS: CPT | Performed by: PHYSICAL THERAPIST

## 2021-03-29 NOTE — PROGRESS NOTES
Daily Note     Today's date: 3/29/2021  Patient name: Stefany Pearl  : 1965  MRN: [id number]  Referring provider: Starla Garza*  Dx:   Encounter Diagnosis     ICD-10-CM    1  Right shoulder pain, unspecified chronicity  M25 511    2  Rotator cuff tear arthropathy of right shoulder  M75 101     M12 811          Subjective: Pt reports that he reached back with his R arm while doing his exercises over the weekend and had some pain  Pt states that his shoulder is a little more sore than usual   Pt has been doing his HEP regularly  Objective: See treatment diary below      Assessment: Tolerated treatment well  Patient demonstrated fatigue post treatment, exhibited good technique with therapeutic exercises and would benefit from continued PT  PROM continues to gradually improve  Plan: Continue per plan of care        Precautions: s/p R shoulder RC repair      Manuals 2/26 3/1 3/4 3/8 3/11 3/15 3/18 3/22 3/25 3/29   PROM HK HK HK HK HK HK HK HK HK HK   [facility] jt mobs        HK HK HK                             Ther Ex 2/26    3/11 3/15 3/18 3/22 3/25 3/29   HEP review  HK            Table slide flexion     x30 flexion and ER  x30 x30 x30 x30 x30   Pulleys        6' 6' 6' 6'   UBE       3f/3b 3F/3B 3F/3B 3F/3B   Scap 4         5"  x30 5"  x30                             Modalities 2/26 3/1 3/4 3/8 3/11 3/15 3/18 3/22 3/25 3/29   Ice PRN 15' 15' NP         MH    15' 15' 15' 15' 15' 15' 15'

## 2021-04-01 ENCOUNTER — OFFICE VISIT (OUTPATIENT)
Dept: PHYSICAL THERAPY | Facility: MEDICAL CENTER | Age: 56
End: 2021-04-01
Payer: COMMERCIAL

## 2021-04-01 DIAGNOSIS — M75.101 ROTATOR CUFF TEAR ARTHROPATHY OF RIGHT SHOULDER: ICD-10-CM

## 2021-04-01 DIAGNOSIS — M12.811 ROTATOR CUFF TEAR ARTHROPATHY OF RIGHT SHOULDER: ICD-10-CM

## 2021-04-01 DIAGNOSIS — M25.511 RIGHT SHOULDER PAIN, UNSPECIFIED CHRONICITY: Primary | ICD-10-CM

## 2021-04-01 PROCEDURE — 97110 THERAPEUTIC EXERCISES: CPT | Performed by: PHYSICAL THERAPIST

## 2021-04-01 PROCEDURE — 97140 MANUAL THERAPY 1/> REGIONS: CPT | Performed by: PHYSICAL THERAPIST

## 2021-04-01 NOTE — PROGRESS NOTES
Daily Note     Today's date: 2021  Patient name: Arti Robert  : 1965  MRN: [id number]  Referring provider: Praveen Roldan*  Dx:   Encounter Diagnosis     ICD-10-CM    1  Right shoulder pain, unspecified chronicity  M25 511    2  Rotator cuff tear arthropathy of right shoulder  M75 101     M12 811             Subjective: Pt reports that he continues to do his HEP regularly  Pt doesn't think he has made progress with the pulleys since Monday  Objective: See treatment diary below      Assessment: Tolerated treatment well  Patient demonstrated fatigue post treatment, exhibited good technique with therapeutic exercises and would benefit from continued PT  Pt is slowly improving with PROM  Plan: Continue per plan of care        Precautions: s/p R shoulder RC repair      Manuals             PROM HK            GH jt mobs HK                                      Ther Ex             Table slide flexion x30            Pulleys  6'            UBE 3F/3B            Scap 4 5"  x30                                      Modalities             MH 15'

## 2021-04-05 ENCOUNTER — OFFICE VISIT (OUTPATIENT)
Dept: PHYSICAL THERAPY | Facility: MEDICAL CENTER | Age: 56
End: 2021-04-05
Payer: COMMERCIAL

## 2021-04-05 DIAGNOSIS — M12.811 ROTATOR CUFF TEAR ARTHROPATHY OF RIGHT SHOULDER: ICD-10-CM

## 2021-04-05 DIAGNOSIS — M75.101 ROTATOR CUFF TEAR ARTHROPATHY OF RIGHT SHOULDER: ICD-10-CM

## 2021-04-05 DIAGNOSIS — M25.511 RIGHT SHOULDER PAIN, UNSPECIFIED CHRONICITY: Primary | ICD-10-CM

## 2021-04-05 PROCEDURE — 97110 THERAPEUTIC EXERCISES: CPT | Performed by: PHYSICAL THERAPIST

## 2021-04-05 PROCEDURE — 97140 MANUAL THERAPY 1/> REGIONS: CPT | Performed by: PHYSICAL THERAPIST

## 2021-04-05 NOTE — PROGRESS NOTES
Daily Note     Today's date: 2021  Patient name: Zahra Willson  : 1965  MRN: [id number]  Referring provider: Thong Meng*  Dx:   Encounter Diagnosis     ICD-10-CM    1  Right shoulder pain, unspecified chronicity  M25 511    2  Rotator cuff tear arthropathy of right shoulder  M75 101     M12 811          Subjective: Pt reports that he has noted improvement in his ability to lift his arm  He states that on Friday his R shoulder was the most sore it has been since surgery  Objective: See treatment diary below      Assessment: Tolerated treatment well  Patient demonstrated fatigue post treatment, exhibited good technique with therapeutic exercises and would benefit from continued PT  Pt is gradually improving in all areas  Plan: Continue per plan of care        Precautions: s/p R shoulder RC repair      Manuals            PROM HK HK           GH jt mobs HK HK                                     Ther Ex            Table slide flexion x30 x30           Pulleys  6' 6'           UBE 3F/3B 3F/3B           Scap 4 5"  x30 5"  x30           SL ER  3x15                        Modalities            MH 15' 15'

## 2021-04-08 ENCOUNTER — OFFICE VISIT (OUTPATIENT)
Dept: PHYSICAL THERAPY | Facility: MEDICAL CENTER | Age: 56
End: 2021-04-08
Payer: COMMERCIAL

## 2021-04-08 DIAGNOSIS — M75.101 ROTATOR CUFF TEAR ARTHROPATHY OF RIGHT SHOULDER: ICD-10-CM

## 2021-04-08 DIAGNOSIS — M25.511 RIGHT SHOULDER PAIN, UNSPECIFIED CHRONICITY: Primary | ICD-10-CM

## 2021-04-08 DIAGNOSIS — M12.811 ROTATOR CUFF TEAR ARTHROPATHY OF RIGHT SHOULDER: ICD-10-CM

## 2021-04-08 PROCEDURE — 97110 THERAPEUTIC EXERCISES: CPT | Performed by: PHYSICAL THERAPIST

## 2021-04-08 PROCEDURE — 97140 MANUAL THERAPY 1/> REGIONS: CPT | Performed by: PHYSICAL THERAPIST

## 2021-04-08 PROCEDURE — 97112 NEUROMUSCULAR REEDUCATION: CPT | Performed by: PHYSICAL THERAPIST

## 2021-04-08 NOTE — PROGRESS NOTES
Daily Note     Today's date: 2021  Patient name: Kvng Patel  : 1965  MRN: [id number]  Referring provider: Weston Uribe*  Dx:   Encounter Diagnosis     ICD-10-CM    1  Right shoulder pain, unspecified chronicity  M25 511    2  Rotator cuff tear arthropathy of right shoulder  M75 101     M12 811           Subjective: Pt reports that he did not have a good day on Tuesday and had more trouble lifting his arm  It felt better yesterday and is feeling pretty good today  Objective: See treatment diary below      Assessment: Tolerated treatment well  Patient demonstrated fatigue post treatment, exhibited good technique with therapeutic exercises and would benefit from continued PT  Pt is progressing nicely in all areas  Plan: Continue per plan of care        Precautions: s/p R shoulder RC repair      Manuals           PROM [facility] HK          GH jt mobs [facility]                                    Ther Ex           Table slide flexion x30 x30 x30          Pulleys  6' 6' 6'          UBE 3F/3B 3F/3B 3F/3B          Scap 4 5"  x30 5"  x30 Red  3x10          SL ER  3x15 3x15          Prone pro/ret   x30          UE alpha   Sup  2X          Modalities           MH 15' 15' 15'

## 2021-04-12 ENCOUNTER — OFFICE VISIT (OUTPATIENT)
Dept: PHYSICAL THERAPY | Facility: MEDICAL CENTER | Age: 56
End: 2021-04-12
Payer: COMMERCIAL

## 2021-04-12 DIAGNOSIS — M12.811 ROTATOR CUFF TEAR ARTHROPATHY OF RIGHT SHOULDER: ICD-10-CM

## 2021-04-12 DIAGNOSIS — M75.101 ROTATOR CUFF TEAR ARTHROPATHY OF RIGHT SHOULDER: ICD-10-CM

## 2021-04-12 DIAGNOSIS — M25.511 RIGHT SHOULDER PAIN, UNSPECIFIED CHRONICITY: Primary | ICD-10-CM

## 2021-04-12 PROCEDURE — 97112 NEUROMUSCULAR REEDUCATION: CPT | Performed by: PHYSICAL THERAPIST

## 2021-04-12 PROCEDURE — 97140 MANUAL THERAPY 1/> REGIONS: CPT | Performed by: PHYSICAL THERAPIST

## 2021-04-12 PROCEDURE — 97110 THERAPEUTIC EXERCISES: CPT | Performed by: PHYSICAL THERAPIST

## 2021-04-12 NOTE — PROGRESS NOTES
Daily Note     Today's date: 2021  Patient name: Marcel Connelly  : 1965  MRN: [id number]  Referring provider: Becka Talavera  Dx:   Encounter Diagnosis     ICD-10-CM    1  Right shoulder pain, unspecified chronicity  M25 511    2  Rotator cuff tear arthropathy of right shoulder  M75 101     M12 811          Subjective: Pt reports that his R shoulder felt good on Friday and Saturday, but he did not have a good day yesterday during the  day  Objective: See treatment diary below      Assessment: Tolerated treatment well  Patient demonstrated fatigue post treatment, exhibited good technique with therapeutic exercises and would benefit from continued PT  Pt would benefit from continued manual stretching and progressive strengthening  Plan: Continue per plan of care        Precautions: s/p R shoulder RC repair      Manuals          PROM [facility] HK         GH jt mobs [facility]                                   Ther Ex          Table slide flexion x30 x30 x30 x30         Pulleys  6' 6' 6' 6'         UBE 3F/3B 3F/3B 3F/3B 3F/3B         Scap 4 5"  x30 5"  x30 Red  3x10 Red  3x15         SL ER  3x15 3x15 1#  3x15         Prone pro/ret   x30 x30         UE alpha   Sup  2X 1#  Sup  2X         Modalities           15' 15' 15' 15'

## 2021-04-15 ENCOUNTER — OFFICE VISIT (OUTPATIENT)
Dept: PHYSICAL THERAPY | Facility: MEDICAL CENTER | Age: 56
End: 2021-04-15
Payer: COMMERCIAL

## 2021-04-15 ENCOUNTER — OFFICE VISIT (OUTPATIENT)
Dept: FAMILY MEDICINE CLINIC | Facility: CLINIC | Age: 56
End: 2021-04-15
Payer: COMMERCIAL

## 2021-04-15 VITALS
SYSTOLIC BLOOD PRESSURE: 124 MMHG | OXYGEN SATURATION: 97 % | HEART RATE: 81 BPM | DIASTOLIC BLOOD PRESSURE: 86 MMHG | WEIGHT: 244.4 LBS | BODY MASS INDEX: 30.39 KG/M2 | TEMPERATURE: 97.8 F | HEIGHT: 75 IN

## 2021-04-15 DIAGNOSIS — M75.101 ROTATOR CUFF TEAR ARTHROPATHY OF RIGHT SHOULDER: ICD-10-CM

## 2021-04-15 DIAGNOSIS — M12.811 ROTATOR CUFF TEAR ARTHROPATHY OF RIGHT SHOULDER: ICD-10-CM
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
## 2021-04-15 DIAGNOSIS — S60.512A CAT SCRATCH OF LEFT HAND, INITIAL ENCOUNTER: Primary | ICD-10-CM

## 2021-04-15 DIAGNOSIS — M25.511 RIGHT SHOULDER PAIN, UNSPECIFIED CHRONICITY: Primary | ICD-10-CM

## 2021-04-15 PROCEDURE — 97112 NEUROMUSCULAR REEDUCATION: CPT | Performed by: PHYSICAL THERAPIST

## 2021-04-15 PROCEDURE — 1036F TOBACCO NON-USER: CPT | Performed by: FAMILY MEDICINE

## 2021-04-15 PROCEDURE — 97110 THERAPEUTIC EXERCISES: CPT | Performed by: PHYSICAL THERAPIST

## 2021-04-15 PROCEDURE — 3008F BODY MASS INDEX DOCD: CPT | Performed by: FAMILY MEDICINE

## 2021-04-15 PROCEDURE — 97140 MANUAL THERAPY 1/> REGIONS: CPT | Performed by: PHYSICAL THERAPIST

## 2021-04-15 PROCEDURE — 99213 OFFICE O/P EST LOW 20 MIN: CPT | Performed by: FAMILY MEDICINE

## 2021-04-15 RX ORDER — AMOXICILLIN AND CLAVULANATE POTASSIUM 875; 125 MG/1; MG/1
1 TABLET, FILM COATED ORAL EVERY 12 HOURS SCHEDULED
Qty: 14 TABLET | Refills: 0 | Status: SHIPPED | OUTPATIENT
Start: 2021-04-15 | End: 2021-04-22

## 2021-04-15 NOTE — PROGRESS NOTES
Daily Note     Today's date: 4/15/2021  Patient name: Daniela Alba  : 1965  MRN: [id number]  Referring provider: Ashanti Piper*  Dx:   Encounter Diagnosis     ICD-10-CM    1  Right shoulder pain, unspecified chronicity  M25 511    2  Rotator cuff tear arthropathy of right shoulder  M75 101     M12 811          Subjective: Pt reports that his shoulder ROM is improving  His upper arm is sore and that is where is will get occasional pain with certain movements  He does not get pain consistently with any particular movement  Objective: See treatment diary below      Assessment: Tolerated treatment well  Patient demonstrated fatigue post treatment, exhibited good technique with therapeutic exercises and would benefit from continued PT  Pt continues to improve in all areas  Plan: Continue per plan of care        Precautions: s/p R shoulder RC repair      Manuals 4/1 4/5 4/8 4/12 4/15        PROM HK HK HK HK HK        GH jt mobs [facility] HK HK                                  Ther Ex 4/1 4/5 4/8 4/12 4/15        Table slide flexion x30 x30 x30 x30 x30        Pulleys  6' 6' 6' 6' 6'        UBE 3F/3B 3F/3B 3F/3B 3F/3B 3F/3B        Scap 4 5"  x30 5"  x30 Red  3x10 Red  3x15 Green  3x10        SL ER  3x15 3x15 1#  3x15 1#  3x15        Prone pro/ret   x30 x30 x30        UE alpha   Sup  2X 1#  Sup  2X 1#  3X  sup        Modalities 4/1 4/5 4/8 4/12 4/15        MH 15' 15' 15' 15' 15'

## 2021-04-15 NOTE — PROGRESS NOTES
Assessment/Plan:    1  Cat scratch of left hand, initial encounter  Assessment & Plan:  Cellulitis after cat scratch of hand  Patient should monitor for worsening infection, such as drainage from lesion, worsening redness and swelling  Start Augmentin BID x 7 days  Follow up if symptoms do not improve    Orders:  -     amoxicillin-clavulanate (AUGMENTIN) 875-125 mg per tablet; Take 1 tablet by mouth every 12 (twelve) hours for 7 days      Subjective:      Patient ID: Rosalva Kessler is a 64 y o  male  HPI    Patient presenting with cat scratch on the back of his left hand  His cat scratched him yesterday while they were playing  The cat is up to date on immunizations  The patient noticed some swelling and redness in the area this morning while he was at physical therapy  He shahid a [place] around the area of redness and states that within the last few hours he already has noticed the redness spreading  No fever, all other RoS negative       The following portions of the patient's history were reviewed and updated as appropriate: allergies, current medications, past family history, past medical history, past social history, past surgical history, and problem list       Current Outpatient Medications:     Acai Berry 500 MG CAPS, 2,000 mg, Disp: , Rfl:     albuterol (2 5 mg/3 mL) 0 083 % nebulizer solution, Inhale 1 each, Disp: , Rfl:     aspirin 81 MG tablet, Take 1 capsule by mouth daily, Disp: , Rfl:     B-Complex-C CAPS, take one cap by mouth once daily, Disp: , Rfl:     Cholecalciferol (VITAMIN D3) 1000 units CAPS, Take 1 tablet by mouth daily, Disp: , Rfl:     co-enzyme Q-10 100 mg capsule, Take 100 mg by mouth, Disp: , Rfl:     Multiple Vitamins-Minerals (MULTIVITAMIN WITH MINERALS) tablet, Take 1 tablet by mouth daily, Disp: , Rfl:     Omega-3 Fatty Acids (FISH OIL) 1200 MG CAPS, Take 1 capsule by mouth daily, Disp: , Rfl:     Saccharomyces boulardii (PROBIOTIC) 250 MG CAPS, Take by mouth, Disp: , Rfl:     SYMBICORT 160-4 5 MCG/ACT inhaler, INL 2 PUFFS PO Q 12 H, Disp: , Rfl: 3    Turmeric (CURCUMIN 95) 500 MG CAPS, Take 1 capsule by mouth, Disp: , Rfl:     amoxicillin-clavulanate (AUGMENTIN) 875-125 mg per tablet, Take 1 tablet by mouth every 12 (twelve) hours for 7 days, Disp: 14 tablet, Rfl: 0      Review of Systems   Constitutional: Negative for chills and fever  HENT: Negative for ear pain and sore throat  Eyes: Negative for pain and visual disturbance  Respiratory: Negative for cough and shortness of breath  Cardiovascular: Negative for chest pain and palpitations  Gastrointestinal: Negative for abdominal pain and vomiting  Genitourinary: Negative for dysuria and hematuria  Musculoskeletal: Negative for arthralgias and back pain  Skin: Positive for rash  Negative for color change  Neurological: Negative for seizures and syncope  All other systems reviewed and are negative  Objective:      /86 (BP Location: Left arm, Patient Position: Sitting, Cuff Size: Large)   Pulse 81   Temp 97 8 °F (36 6 °C) (Tympanic)   Ht 6' 3" (1 905 m)   Wt 111 kg (244 lb 6 4 oz)   SpO2 97%   BMI 30 55 kg/m²          Physical Exam  Vitals signs and nursing note reviewed  Constitutional:       General: He is not in acute distress  Appearance: Normal appearance  He is not ill-appearing  HENT:      Head: Normocephalic and atraumatic  Eyes:      Extraocular Movements: Extraocular movements intact  Conjunctiva/sclera: Conjunctivae normal    Neck:      Musculoskeletal: Normal range of motion  Cardiovascular:      Rate and Rhythm: Normal rate and regular rhythm  Heart sounds: Normal heart sounds  No murmur  Pulmonary:      Effort: Pulmonary effort is normal  No respiratory distress  Breath sounds: Normal breath sounds  No wheezing  Lymphadenopathy:      Cervical: No cervical adenopathy  Skin:     General: Skin is warm        Findings: Abrasion and erythema present  Neurological:      General: No focal deficit present  Mental Status: He is alert and oriented to person, place, and time  Psychiatric:         Mood and Affect: Mood normal          Behavior: Behavior normal          Thought Content:  Thought content normal

## 2021-04-15 NOTE — ASSESSMENT & PLAN NOTE
Cellulitis after cat scratch of hand  Patient should monitor for worsening infection, such as drainage from lesion, worsening redness and swelling      Start Augmentin BID x 7 days  Follow up if symptoms do not improve

## 2021-04-19 ENCOUNTER — OFFICE VISIT (OUTPATIENT)
Dept: PHYSICAL THERAPY | Facility: MEDICAL CENTER | Age: 56
End: 2021-04-19
Payer: COMMERCIAL

## 2021-04-19 DIAGNOSIS — M75.101 ROTATOR CUFF TEAR ARTHROPATHY OF RIGHT SHOULDER: ICD-10-CM

## 2021-04-19 DIAGNOSIS — M25.511 RIGHT SHOULDER PAIN, UNSPECIFIED CHRONICITY: Primary | ICD-10-CM

## 2021-04-19 DIAGNOSIS — M12.811 ROTATOR CUFF TEAR ARTHROPATHY OF RIGHT SHOULDER: ICD-10-CM

## 2021-04-19 PROCEDURE — 97140 MANUAL THERAPY 1/> REGIONS: CPT | Performed by: PHYSICAL THERAPIST

## 2021-04-19 PROCEDURE — 97110 THERAPEUTIC EXERCISES: CPT | Performed by: PHYSICAL THERAPIST

## 2021-04-19 PROCEDURE — 97112 NEUROMUSCULAR REEDUCATION: CPT | Performed by: PHYSICAL THERAPIST

## 2021-04-19 NOTE — PROGRESS NOTES
Daily Note     Today's date: 2021  Patient name: Fly Aragon  : 1965  MRN: [id number]  Referring provider: Wayne Elena*  Dx:   Encounter Diagnosis     ICD-10-CM    1  Right shoulder pain, unspecified chronicity  M25 511    2  Rotator cuff tear arthropathy of right shoulder  M75 101     M12 811                   Subjective: Pt reports that last night he had a sharp pain that woke him up  Other than that his shoulder has been feeling pretty good  Objective: See treatment diary below      Assessment: Tolerated treatment well  Patient demonstrated fatigue post treatment, exhibited good technique with therapeutic exercises and would benefit from continued PT  Plan: Continue per plan of care        Precautions: s/p R shoulder RC repair      Manuals 4/1 4/5 4/8 4/12 4/15 4/19       PROM HK HK HK HK HK HK       GH jt mobs HK HK HK HK HK HK                                 Ther Ex 4/1 4/5 4/8 4/12 4/15 4/19       Table slide flexion x30 x30 x30 x30 x30 x30       Pulleys  6' 6' 6' 6' 6' 6'       UBE 3F/3B 3F/3B 3F/3B 3F/3B 3F/3B 3F/3B       Scap 4 5"  x30 5"  x30 Red  3x10 Red  3x15 Green  3x10 Green  3x10       SL ER  3x15 3x15 1#  3x15 1#  3x15 2#  3x10       Prone pro/ret   x30 x30 x30 x30       Prone raises X 3      x10       UE alpha   Sup  2X 1#  Sup  2X 1#  3X  sup 2#  3X  sup       Modalities 4/1 4/5 4/8 4/12 4/15 4/19        15' 15' 15' 15 15 15

## 2021-04-22 ENCOUNTER — OFFICE VISIT (OUTPATIENT)
Dept: PHYSICAL THERAPY | Facility: MEDICAL CENTER | Age: 56
End: 2021-04-22
Payer: COMMERCIAL

## 2021-04-22 DIAGNOSIS — M12.811 ROTATOR CUFF TEAR ARTHROPATHY OF RIGHT SHOULDER: ICD-10-CM

## 2021-04-22 DIAGNOSIS — M75.101 ROTATOR CUFF TEAR ARTHROPATHY OF RIGHT SHOULDER: ICD-10-CM

## 2021-04-22 DIAGNOSIS — M25.511 RIGHT SHOULDER PAIN, UNSPECIFIED CHRONICITY: Primary | ICD-10-CM

## 2021-04-22 PROCEDURE — 97112 NEUROMUSCULAR REEDUCATION: CPT | Performed by: PHYSICAL THERAPIST

## 2021-04-22 PROCEDURE — 97140 MANUAL THERAPY 1/> REGIONS: CPT | Performed by: PHYSICAL THERAPIST

## 2021-04-22 PROCEDURE — 97110 THERAPEUTIC EXERCISES: CPT | Performed by: PHYSICAL THERAPIST

## 2021-04-22 NOTE — PROGRESS NOTES
Daily Note     Today's date: 2021  Patient name: Liliana Seals  : 1965  MRN: [id number]  Referring provider: Balbina Maldonado  Dx:   Encounter Diagnosis     ICD-10-CM    1  Right shoulder pain, unspecified chronicity  M25 511    2  Rotator cuff tear arthropathy of right shoulder  M75 101     M12 811          Subjective:  Pt reports that today his shoulder feels the best it has been  He hasn't had any pain today  Objective: See treatment diary below      Assessment: Tolerated treatment well  Patient demonstrated fatigue post treatment, exhibited good technique with therapeutic exercises and would benefit from continued PT  Plan: Continue per plan of care        Precautions: s/p R shoulder RC repair      Manuals 4/1 4/5 4/8 4/12 4/15 4/19 4/22      PROM HK HK HK HK HK HK HK      GH jt mobs HK HK HK HK HK HK HK                                Ther Ex 4/1 4/5 4/8 4/12 4/15 4/19 4/22      Table slide flexion x30 x30 x30 x30 x30 x30 x30      Pulleys  6' 6' 6' 6' 6' 6' 6'      UBE 3F/3B 3F/3B 3F/3B 3F/3B 3F/3B 3F/3B 3F/3B      Scap 4 5"  x30 5"  x30 Red  3x10 Red  3x15 Green  3x10 Green  3x10 Green  3x10      SL ER  3x15 3x15 1#  3x15 1#  3x15 2#  3x10 2#  3x15      Prone pro/ret   x30 x30 x30 x30 x30      Prone raises X 3      x10 2x10      UE alpha   Sup  2X 1#  Sup  2X 1#  3X  sup 2#  3X  sup 2#  3X  sup      Modalities 4/1 4/5 4/8 4/12 4/15 4/19 4/22       15' 15' 15' 15' 15' 15' 15'

## 2021-04-26 ENCOUNTER — OFFICE VISIT (OUTPATIENT)
Dept: PHYSICAL THERAPY | Facility: MEDICAL CENTER | Age: 56
End: 2021-04-26
Payer: COMMERCIAL

## 2021-04-26 DIAGNOSIS — M75.101 ROTATOR CUFF TEAR ARTHROPATHY OF RIGHT SHOULDER: ICD-10-CM

## 2021-04-26 DIAGNOSIS — M12.811 ROTATOR CUFF TEAR ARTHROPATHY OF RIGHT SHOULDER: ICD-10-CM

## 2021-04-26 DIAGNOSIS — M25.511 RIGHT SHOULDER PAIN, UNSPECIFIED CHRONICITY: Primary | ICD-10-CM

## 2021-04-26 PROCEDURE — 97112 NEUROMUSCULAR REEDUCATION: CPT | Performed by: PHYSICAL THERAPIST

## 2021-04-26 PROCEDURE — 97110 THERAPEUTIC EXERCISES: CPT | Performed by: PHYSICAL THERAPIST

## 2021-04-26 PROCEDURE — 97140 MANUAL THERAPY 1/> REGIONS: CPT | Performed by: PHYSICAL THERAPIST

## 2021-04-26 NOTE — PROGRESS NOTES
Daily Note     Today's date: 2021  Patient name: Alyx Tao  : 1965  MRN: [id number]  Referring provider: Italo Fountain*  Dx:   Encounter Diagnosis     ICD-10-CM    1  Right shoulder pain, unspecified chronicity  M25 511    2  Rotator cuff tear arthropathy of right shoulder  M75 101     M12 811          Subjective: Pt reports that in general since Tuesday his shoulder has been feeling good  He states that he had a 4 or 5 shots of pain throughout the day, but it was different than it used to be  Objective: See treatment diary below      Assessment: Tolerated treatment well  Patient demonstrated fatigue post treatment, exhibited good technique with therapeutic exercises and would benefit from continued PT  Pt is doing well and progressing appropriately  Plan: Continue per plan of care        Precautions: s/p R shoulder RC repair      Manuals 4/1 4/5 4/8 4/12 4/15 4/19 4/22 4/26     PROM HK HK HK HK HK HK HK HK     GH jt mobs HK HK HK HK HK HK HK HK                               Ther Ex 4/1 4/5 4/8 4/12 4/15 4/19 4/22 4/26     Table slide flexion x30 x30 x30 x30 x30 x30 x30 D/C     Pulleys  6' 6' 6' 6' 6' 6' 6' 6'     UBE 3F/3B 3F/3B 3F/3B 3F/3B 3F/3B 3F/3B 3F/3B 3F/3B     Scap 4 5"  x30 5"  x30 Red  3x10 Red  3x15 Green  3x10 Green  3x10 Green  3x10 Blue  3x10     SL ER  3x15 3x15 1#  3x15 1#  3x15 2#  3x10 2#  3x15 2#  3x15     Prone pro/ret   x30 x30 x30 x30 x30 x30     Prone raises X 3      x10 x10 2x10     UE alpha   Sup  2X 1#  Sup  2X 1#  3X  sup 2#  3X  sup 2#  3X  sup 2#  3X  sup     Modalities 4/1 4/5 4/8 4/12 4/15 4/19 4/22 4/26      15' 15' 15' 15' 15' 15' 15' 15'

## 2021-04-29 ENCOUNTER — OFFICE VISIT (OUTPATIENT)
Dept: PHYSICAL THERAPY | Facility: MEDICAL CENTER | Age: 56
End: 2021-04-29
Payer: COMMERCIAL

## 2021-04-29 DIAGNOSIS — M75.101 ROTATOR CUFF TEAR ARTHROPATHY OF RIGHT SHOULDER: ICD-10-CM

## 2021-04-29 DIAGNOSIS — M25.511 RIGHT SHOULDER PAIN, UNSPECIFIED CHRONICITY: Primary | ICD-10-CM

## 2021-04-29 DIAGNOSIS — M12.811 ROTATOR CUFF TEAR ARTHROPATHY OF RIGHT SHOULDER: ICD-10-CM

## 2021-04-29 PROCEDURE — 97110 THERAPEUTIC EXERCISES: CPT | Performed by: PHYSICAL THERAPIST

## 2021-04-29 PROCEDURE — 97112 NEUROMUSCULAR REEDUCATION: CPT | Performed by: PHYSICAL THERAPIST

## 2021-04-29 PROCEDURE — 97140 MANUAL THERAPY 1/> REGIONS: CPT | Performed by: PHYSICAL THERAPIST

## 2021-04-29 NOTE — PROGRESS NOTES
Daily Note     Today's date: 2021  Patient name: Arti Robert  : 1965  MRN: [id number]  Referring provider: Praveen Roldan*  Dx:   Encounter Diagnosis     ICD-10-CM    1  Right shoulder pain, unspecified chronicity  M25 511    2  Rotator cuff tear arthropathy of right shoulder  M75 101     M12 811                   Subjective: Pt reports that his shoulder has been sore since last visit  He states that he has been tender on the top of his shoulder and in the front  This week has not been as good as last week  Objective: See treatment diary below      Assessment: Tolerated treatment well  Patient demonstrated fatigue post treatment, exhibited good technique with therapeutic exercises and would benefit from continued PT  Plan: Continue per plan of care        Precautions: s/p R shoulder RC repair      Manuals 4/1 4/5 4/8 4/12 4/15 4/19 4/22 4/26 4/29    PROM HK HK HK HK HK HK HK HK HK    GH jt mobs HK HK HK HK HK HK HK HK HK                              Ther Ex 4/1 4/5 4/8 4/12 4/15 4/19 4/22 4/26 4/29    Table slide flexion x30 x30 x30 x30 x30 x30 x30 D/C     Pulleys  6' 6' 6' 6' 6' 6' 6' 6' 6'    UBE 3F/3B 3F/3B 3F/3B 3F/3B 3F/3B 3F/3B 3F/3B 3F/3B 3F/3B    Scap 4 5"  x30 5"  x30 Red  3x10 Red  3x15 Green  3x10 Green  3x10 Green  3x10 Blue  3x10 Blue  3x10    SL ER  3x15 3x15 1#  3x15 1#  3x15 2#  3x10 2#  3x15 2#  3x15 2#  3x15    Prone pro/ret   x30 x30 x30 x30 x30 x30 x30    Prone raises X 3      x10 x10 2x10 2x10    UE alpha   Sup  2X 1#  Sup  2X 1#  3X  sup 2#  3X  sup 2#  3X  sup 2#  3X  sup 2#  3x  sup    Modalities 4/1 4/5 4/8 4/12 4/15 4/19 4/22 4/26 4/26    MH 15' 15' 15' 15' 15' 15' 15' 15' 15'

## 2021-05-03 ENCOUNTER — OFFICE VISIT (OUTPATIENT)
Dept: PHYSICAL THERAPY | Facility: MEDICAL CENTER | Age: 56
End: 2021-05-03
Payer: COMMERCIAL

## 2021-05-03 DIAGNOSIS — M12.811 ROTATOR CUFF TEAR ARTHROPATHY OF RIGHT SHOULDER: ICD-10-CM

## 2021-05-03 DIAGNOSIS — M75.101 ROTATOR CUFF TEAR ARTHROPATHY OF RIGHT SHOULDER: ICD-10-CM

## 2021-05-03 DIAGNOSIS — M25.511 RIGHT SHOULDER PAIN, UNSPECIFIED CHRONICITY: Primary | ICD-10-CM

## 2021-05-03 PROCEDURE — 97112 NEUROMUSCULAR REEDUCATION: CPT | Performed by: PHYSICAL THERAPIST

## 2021-05-03 PROCEDURE — 97110 THERAPEUTIC EXERCISES: CPT | Performed by: PHYSICAL THERAPIST

## 2021-05-03 PROCEDURE — 97140 MANUAL THERAPY 1/> REGIONS: CPT | Performed by: PHYSICAL THERAPIST

## 2021-05-03 NOTE — PROGRESS NOTES
Daily Note     Today's date: 5/3/2021  Patient name: Marcel Connelly  : 1965  MRN: [id number]  Referring provider: Julito Pearl*  Dx:   Encounter Diagnosis     ICD-10-CM    1  Right shoulder pain, unspecified chronicity  M25 511    2  Rotator cuff tear arthropathy of right shoulder  M75 101     M12 811          Subjective:  Pt reports that he was more active over the weekend and his R shoulder seems to be more tight  Objective: See treatment diary below      Assessment: Tolerated treatment well  Patient demonstrated fatigue post treatment, exhibited good technique with therapeutic exercises and would benefit from continued PT  Pt continues to slowly progress in all areas  Plan: Continue per plan of care        Precautions: s/p R shoulder RC repair      Manuals 4/1 4/5 4/8 4/12 4/15 4/19 4/22 4/26 4/29 53   PROM HK HK HK HK HK HK HK HK HK HK   GH jt mobs HK HK HK HK HK HK HK HK HK HK                              Ther Ex 4/1 4/5 4/8 4/12 4/15 4/19 4/22 4/26 4/29 5/3   Table slide flexion x30 x30 x30 x30 x30 x30 x30 D/C     Pulleys  6' 6' 6' 6' 6' 6' 6' 6' 6' 6'   UBE 3F/3B 3F/3B 3F/3B 3F/3B 3F/3B 3F/3B 3F/3B 3F/3B 3F/3B 3F/3B   Scap 4 5"  x30 5"  x30 Red  3x10 Red  3x15 Green  3x10 Green  3x10 Green  3x10 Blue  3x10 Blue  3x10 Blue  3x10   SL ER  3x15 3x15 1#  3x15 1#  3x15 2#  3x10 2#  3x15 2#  3x15 2#  3x15 3#  3x10   Prone pro/ret   x30 x30 x30 x30 x30 x30 x30 x30   Prone raises X 3      x10 x10 2x10 2x10 2x10   UE alpha   Sup  2X 1#  Sup  2X 1#  3X  sup 2#  3X  sup 2#  3X  sup 2#  3X  sup 2#  3x  sup 3#  3X   Modalities 4/1 4/5 4/8 4/12 4/15 4/19 4/22 4/26 4/26 5/3   MH 15' 15' 15' 15' 15' 15' 15' 15' 15' 15'

## 2021-05-06 ENCOUNTER — OFFICE VISIT (OUTPATIENT)
Dept: PHYSICAL THERAPY | Facility: MEDICAL CENTER | Age: 56
End: 2021-05-06
Payer: COMMERCIAL

## 2021-05-06 DIAGNOSIS — M25.511 RIGHT SHOULDER PAIN, UNSPECIFIED CHRONICITY: Primary | ICD-10-CM

## 2021-05-06 DIAGNOSIS — M12.811 ROTATOR CUFF TEAR ARTHROPATHY OF RIGHT SHOULDER: ICD-10-CM

## 2021-05-06 DIAGNOSIS — M75.101 ROTATOR CUFF TEAR ARTHROPATHY OF RIGHT SHOULDER: ICD-10-CM

## 2021-05-06 PROCEDURE — 97110 THERAPEUTIC EXERCISES: CPT | Performed by: PHYSICAL THERAPIST

## 2021-05-06 PROCEDURE — 97112 NEUROMUSCULAR REEDUCATION: CPT | Performed by: PHYSICAL THERAPIST

## 2021-05-06 PROCEDURE — 97140 MANUAL THERAPY 1/> REGIONS: CPT | Performed by: PHYSICAL THERAPIST

## 2021-05-06 NOTE — PROGRESS NOTES
Daily Note     Today's date: 2021  Patient name: Hayder Blue  : 1965  MRN: [id number]  Referring provider: Maty Jensen*  Dx:   Encounter Diagnosis     ICD-10-CM    1  Right shoulder pain, unspecified chronicity  M25 511    2  Rotator cuff tear arthropathy of right shoulder  M75 101     M12 811             Subjective:  Pt reports that his shoulder was very sore on Monday PM due to doing yard work over the weekend  Objective: See treatment diary below      Assessment: Tolerated treatment well  Patient demonstrated fatigue post treatment, exhibited good technique with therapeutic exercises and would benefit from continued PT  Plan: Continue per plan of care        Precautions: s/p R shoulder RC repair      Manuals             PROM HK            GH jt mobs HK                                      Ther Ex             Table slide flexion x30            Pulleys  6'            UBE 3F/3B            Scap 4 Blue  3x10            SL ER 3#  3x10            Prone pro/ret x30            Prone raises X 3 3x10            UE alpha 3#  3X            Modalities             MH 15'

## 2021-05-10 ENCOUNTER — OFFICE VISIT (OUTPATIENT)
Dept: PHYSICAL THERAPY | Facility: MEDICAL CENTER | Age: 56
End: 2021-05-10
Payer: COMMERCIAL

## 2021-05-10 DIAGNOSIS — M25.511 RIGHT SHOULDER PAIN, UNSPECIFIED CHRONICITY: Primary | ICD-10-CM

## 2021-05-10 DIAGNOSIS — M75.101 ROTATOR CUFF TEAR ARTHROPATHY OF RIGHT SHOULDER: ICD-10-CM

## 2021-05-10 DIAGNOSIS — M12.811 ROTATOR CUFF TEAR ARTHROPATHY OF RIGHT SHOULDER: ICD-10-CM

## 2021-05-10 PROCEDURE — 97112 NEUROMUSCULAR REEDUCATION: CPT | Performed by: PHYSICAL THERAPIST

## 2021-05-10 PROCEDURE — 97140 MANUAL THERAPY 1/> REGIONS: CPT | Performed by: PHYSICAL THERAPIST

## 2021-05-10 PROCEDURE — 97110 THERAPEUTIC EXERCISES: CPT | Performed by: PHYSICAL THERAPIST

## 2021-05-10 NOTE — PROGRESS NOTES
Daily Note     Today's date: 5/10/2021  Patient name: Liliana Seals  : 1965  MRN: [id number]  Referring provider: Briana More*  Dx:   Encounter Diagnosis     ICD-10-CM    1  Right shoulder pain, unspecified chronicity  M25 511    2  Rotator cuff tear arthropathy of right shoulder  M75 101     M12 811          Subjective: Pt reports that he feels sore and tight in his anterior shoulder and in the area of his lat  Pt had some intense pain when he went to toss something over the weekend  He states that it is clear he is not ready to start throwing  Objective: See treatment diary below      Assessment: Tolerated treatment well  Patient demonstrated fatigue post treatment, exhibited good technique with therapeutic exercises and would benefit from continued PT  Pt continues to make gradual progress  Plan: Continue per plan of care        Precautions: s/p R shoulder RC repair      Manuals 5/5 5/10           PROM HK HK           GH jt mobs HK HK                                     Ther Ex 5/5 5/10           Table slide flexion x30 x30           Pulleys  6' 6'           UBE 3F/3B 3F/3B           Scap 4 Blue  3x10 Blue  3x10           SL ER 3#  3x10 3#  3x10           Prone pro/ret x30 x30           Prone raises X 3 3x10 3x10           UE alpha 3#  3X 3#  3X stand          Modalities 5/5 5/10            15' 15'

## 2021-05-13 ENCOUNTER — OFFICE VISIT (OUTPATIENT)
Dept: PHYSICAL THERAPY | Facility: MEDICAL CENTER | Age: 56
End: 2021-05-13
Payer: COMMERCIAL

## 2021-05-13 DIAGNOSIS — M75.101 ROTATOR CUFF TEAR ARTHROPATHY OF RIGHT SHOULDER: ICD-10-CM

## 2021-05-13 DIAGNOSIS — M12.811 ROTATOR CUFF TEAR ARTHROPATHY OF RIGHT SHOULDER: ICD-10-CM

## 2021-05-13 DIAGNOSIS — M25.511 RIGHT SHOULDER PAIN, UNSPECIFIED CHRONICITY: Primary | ICD-10-CM

## 2021-05-13 PROCEDURE — 97110 THERAPEUTIC EXERCISES: CPT | Performed by: PHYSICAL THERAPIST

## 2021-05-13 PROCEDURE — 97112 NEUROMUSCULAR REEDUCATION: CPT | Performed by: PHYSICAL THERAPIST

## 2021-05-13 PROCEDURE — 97140 MANUAL THERAPY 1/> REGIONS: CPT | Performed by: PHYSICAL THERAPIST

## 2021-05-13 NOTE — PROGRESS NOTES
Daily Note     Today's date: 2021  Patient name: Liliana Seals  : 1965  MRN: [id number]  Referring provider: Briana More*  Dx:   Encounter Diagnosis     ICD-10-CM    1  Right shoulder pain, unspecified chronicity  M25 511    2  Rotator cuff tear arthropathy of right shoulder  M75 101     M12 811             Subjective: Pt reports that his shoulder has been feeling pretty good the last few days  He helped his neighbor with some work, but did not do any heavy lifting  Objective: See treatment diary below      Assessment: Tolerated treatment well  Patient demonstrated fatigue post treatment, exhibited good technique with therapeutic exercises and would benefit from continued PT      Plan: Continue per plan of care        Precautions: s/p R shoulder RC repair      Manuals 5/5 5/10 5/13          PROM HK HK HK          GH jt mobs [facility] HK                                    Ther Ex 5/5 5/10 5/13          Table slide flexion x30 x30 x30          Pulleys  6' 6' 6'          UBE 3F/3B 3F/3B 3F/3B          Scap 4 Blue  3x10 Blue  3x10 Blue  3x10          SL ER 3#  3x10 3#  3x10 3#  3x10          Prone pro/ret x30 x30 x30          Prone raises X 3 3x10 3x10 3x10          UE alpha 3#  3X 3#  3X 3#  3X          Modalities 5/5 5/10 5/13           15' 15' 15'

## 2021-05-17 ENCOUNTER — OFFICE VISIT (OUTPATIENT)
Dept: PHYSICAL THERAPY | Facility: MEDICAL CENTER | Age: 56
End: 2021-05-17
Payer: COMMERCIAL

## 2021-05-17 DIAGNOSIS — M75.101 ROTATOR CUFF TEAR ARTHROPATHY OF RIGHT SHOULDER: ICD-10-CM

## 2021-05-17 DIAGNOSIS — M12.811 ROTATOR CUFF TEAR ARTHROPATHY OF RIGHT SHOULDER: ICD-10-CM

## 2021-05-17 DIAGNOSIS — M25.511 RIGHT SHOULDER PAIN, UNSPECIFIED CHRONICITY: Primary | ICD-10-CM

## 2021-05-17 PROCEDURE — 97140 MANUAL THERAPY 1/> REGIONS: CPT | Performed by: PHYSICAL THERAPIST

## 2021-05-17 PROCEDURE — 97112 NEUROMUSCULAR REEDUCATION: CPT | Performed by: PHYSICAL THERAPIST

## 2021-05-17 PROCEDURE — 97110 THERAPEUTIC EXERCISES: CPT | Performed by: PHYSICAL THERAPIST

## 2021-05-17 NOTE — PROGRESS NOTES
Daily Note     Today's date: 2021  Patient name: Liliana Seals  : 1965  MRN: [id number]  Referring provider: Briana More*  Dx:   Encounter Diagnosis     ICD-10-CM    1  Right shoulder pain, unspecified chronicity  M25 511    2  Rotator cuff tear arthropathy of right shoulder  M75 101     M12 811          Subjective: Pt reports that his shoulder is still tight and he has discomfort around his upper arm  Objective: See treatment diary below      Assessment: Tolerated treatment well  Patient demonstrated fatigue post treatment, exhibited good technique with therapeutic exercises and would benefit from continued PT  Pt is slowly improving in all areas  Plan: Continue per plan of care        Precautions: s/p R shoulder RC repair      Manuals 5/5 5/10 5/13 5/17         PROM HK HK HK HK         GH jt mobs [facility]                                   Ther Ex 5/5 5/10 5/13 5/17         Table slide flexion x30 x30 x30 x30         Pulleys  6' 6' 6' 6'         UBE 3F/3B 3F/3B 3F/3B 3F/3B         Scap 4 Blue  3x10 Blue  3x10 Blue  3x10 Blue  3x10         SL ER 3#  3x10 3#  3x10 3#  3x10 3#  3x10         Prone pro/ret x30 x30 x30 x30         Prone raises X 3 3x10 3x10 3x10 3x10         UE alpha 3#  3X 3#  3X 3#  3X 3#  3x         Modalities 5/5 5/10 5/13 5/17          15' 15' 15' 15'

## 2021-05-20 ENCOUNTER — OFFICE VISIT (OUTPATIENT)
Dept: PHYSICAL THERAPY | Facility: MEDICAL CENTER | Age: 56
End: 2021-05-20
Payer: COMMERCIAL

## 2021-05-20 DIAGNOSIS — M25.511 RIGHT SHOULDER PAIN, UNSPECIFIED CHRONICITY: Primary | ICD-10-CM

## 2021-05-20 DIAGNOSIS — M75.101 ROTATOR CUFF TEAR ARTHROPATHY OF RIGHT SHOULDER: ICD-10-CM

## 2021-05-20 DIAGNOSIS — M12.811 ROTATOR CUFF TEAR ARTHROPATHY OF RIGHT SHOULDER: ICD-10-CM

## 2021-05-20 PROCEDURE — 97112 NEUROMUSCULAR REEDUCATION: CPT | Performed by: PHYSICAL THERAPIST

## 2021-05-20 PROCEDURE — 97110 THERAPEUTIC EXERCISES: CPT | Performed by: PHYSICAL THERAPIST

## 2021-05-20 PROCEDURE — 97140 MANUAL THERAPY 1/> REGIONS: CPT | Performed by: PHYSICAL THERAPIST

## 2021-05-20 NOTE — PROGRESS NOTES
Daily Note     Today's date: 2021  Patient name: Fan San  : 1965  MRN: [id number]  Referring provider: Ana Galeano  Dx:   Encounter Diagnosis     ICD-10-CM    1  Right shoulder pain, unspecified chronicity  M25 511    2  Rotator cuff tear arthropathy of right shoulder  M75 101     M12 811                   Subjective:  Pt reports that he has been having trouble with the standing wall slide exercises at home  He states that he gets a lot of tightness and discomfort in his upper arm  Objective: See treatment diary below    Technique corrected for standing wall slides     Assessment: Tolerated treatment well  Patient demonstrated fatigue post treatment, exhibited good technique with therapeutic exercises and would benefit from continued PT  Plan: Continue per plan of care        Precautions: s/p R shoulder RC repair      Manuals 5/5 5/10 5/13 5/17 5/20        PROM HK HK HK HK HK        GH jt mobs [facility] HK HK                                  Ther Ex 5/5 5/10 5/13 5/17 5/20        Table slide flexion x30 x30 x30 x30 x30        Pulleys  6' 6' 6' 6' 6'        UBE 3F/3B 3F/3B 3F/3B 3F/3B 3F/3B        Scap 4 Blue  3x10 Blue  3x10 Blue  3x10 Blue  3x10 Blk  3x10        SL ER 3#  3x10 3#  3x10 3#  3x10 3#  3x10 3#  3x10        Prone pro/ret x30 x30 x30 x30 x30        Wall slides     Sup  And stand  3x10        Prone raises X 3 3x10 3x10 3x10 3x10 3x10        UE alpha 3#  3X 3#  3X 3#  3X 3#  3x 3#  3X  sup        Modalities 5/5 5/10 5/13 5/17 5/20         15' 15' 15' 15' 15'

## 2021-05-24 ENCOUNTER — OFFICE VISIT (OUTPATIENT)
Dept: PHYSICAL THERAPY | Facility: MEDICAL CENTER | Age: 56
End: 2021-05-24
Payer: COMMERCIAL

## 2021-05-24 DIAGNOSIS — M25.511 RIGHT SHOULDER PAIN, UNSPECIFIED CHRONICITY: Primary | ICD-10-CM

## 2021-05-24 DIAGNOSIS — M12.811 ROTATOR CUFF TEAR ARTHROPATHY OF RIGHT SHOULDER: ICD-10-CM

## 2021-05-24 DIAGNOSIS — M75.101 ROTATOR CUFF TEAR ARTHROPATHY OF RIGHT SHOULDER: ICD-10-CM

## 2021-05-24 PROCEDURE — 97112 NEUROMUSCULAR REEDUCATION: CPT | Performed by: PHYSICAL THERAPIST

## 2021-05-24 PROCEDURE — 97110 THERAPEUTIC EXERCISES: CPT | Performed by: PHYSICAL THERAPIST

## 2021-05-24 PROCEDURE — 97140 MANUAL THERAPY 1/> REGIONS: CPT | Performed by: PHYSICAL THERAPIST

## 2021-05-24 NOTE — PROGRESS NOTES
Daily Note     Today's date: 2021  Patient name: Husam Mcdonough  : 1965  MRN: [id number]  Referring provider: Alie Burt*  Dx:   Encounter Diagnosis     ICD-10-CM    1  Right shoulder pain, unspecified chronicity  M25 511    2  Rotator cuff tear arthropathy of right shoulder  M75 101     M12 811          Subjective: Pt reports doing yard work over the weekend and his shoulder did feel to bad afterward  Objective: See treatment diary below      Assessment: Tolerated treatment well  Patient demonstrated fatigue post treatment, exhibited good technique with therapeutic exercises and would benefit from continued PT  Pt continues to gradual progress in all areas  Plan: Continue per plan of care        Precautions: s/p R shoulder RC repair      Manuals 5/5 5/10 5/13 5/17 5/20 5/24       PROM HK HK HK HK HK HK       GH jt mobs HK HK HK HK HK HK       Shoulder str routine      HK                    Ther Ex 5/5 5/10 5/13 5/17 5/20 5/24       Table slide flexion x30 x30 x30 x30 x30 x30       Pulleys  6' 6' 6' 6' 6' 6'       UBE 3F/3B 3F/3B 3F/3B 3F/3B 3F/3B 3F/3B       Scap 4 Blue  3x10 Blue  3x10 Blue  3x10 Blue  3x10 Blk  3x10 blk  3x10       SL ER 3#  3x10 3#  3x10 3#  3x10 3#  3x10 3#  3x10 4#  3x10       Prone pro/ret x30 x30 x30 x30 x30 x30        Wall slides     Sup  And stand  3x10 x30  Sup and stand       Prone raises X 3 3x10 3x10 3x10 3x10 3x10 3x10       UE alpha 3#  3X 3#  3X 3#  3X 3#  3x 3#  3X  sup Stand   3X       Modalities 5/5 5/10 5/13 5/17 5/20 5/24        15' 15' 15' 15' 15' 15'

## 2021-05-27 ENCOUNTER — EVALUATION (OUTPATIENT)
Dept: PHYSICAL THERAPY | Facility: MEDICAL CENTER | Age: 56
End: 2021-05-27
Payer: COMMERCIAL

## 2021-05-27 DIAGNOSIS — M75.101 ROTATOR CUFF TEAR ARTHROPATHY OF RIGHT SHOULDER: ICD-10-CM

## 2021-05-27 DIAGNOSIS — M12.811 ROTATOR CUFF TEAR ARTHROPATHY OF RIGHT SHOULDER: ICD-10-CM

## 2021-05-27 DIAGNOSIS — M25.511 RIGHT SHOULDER PAIN, UNSPECIFIED CHRONICITY: Primary | ICD-10-CM

## 2021-05-27 PROCEDURE — 97140 MANUAL THERAPY 1/> REGIONS: CPT | Performed by: PHYSICAL THERAPIST

## 2021-05-27 NOTE — PROGRESS NOTES
Daily Note     Today's date: 2021  Patient name: Luis Torrez  : 1965  MRN: [id number]  Referring provider: Marylu Cruz*  Dx:   Encounter Diagnosis     ICD-10-CM    1  Right shoulder pain, unspecified chronicity  M25 511    2  Rotator cuff tear arthropathy of right shoulder  M75 101     M12 811               Subjective: Pt reports that wall slides are getting easier  Objective: See treatment diary below      Assessment: Tolerated treatment well  Patient demonstrated fatigue post treatment, exhibited good technique with therapeutic exercises and would benefit from continued PT      Plan: Continue per plan of care        Precautions: s/p R shoulder RC repair      Manuals 5/5 5/10 5/13 5/17 5/20 5/24 5/27      PROM HK HK HK HK HK HK HK      GH jt mobs HK HK HK HK HK HK HK      Shoulder str routine      HK HK                   Ther Ex 5/5 5/10 5/13 5/17 5/20 5/24 5/27      Table slide flexion x30 x30 x30 x30 x30 x30 x30      Pulleys  6' 6' 6' 6' 6' 6' 6'      UBE 3F/3B 3F/3B 3F/3B 3F/3B 3F/3B 3F/3B 3F/3B      Scap 4 Blue  3x10 Blue  3x10 Blue  3x10 Blue  3x10 Blk  3x10 blk  3x10 Blk  3x15      SL ER 3#  3x10 3#  3x10 3#  3x10 3#  3x10 3#  3x10 4#  3x10 4#  3x10      BTH ER        5"  x30      BTB IR str       3x30"      Belly presses       5"  x30                   Prone pro/ret x30 x30 x30 x30 x30 x30  x30      Wall slides     Sup  And stand  3x10 x30  Sup and stand x30  Sup and stand      Prone raises X 3 3x10 3x10 3x10 3x10 3x10 3x10 3x10      UE alpha 3#  3X 3#  3X 3#  3X 3#  3x 3#  3X  sup Stand   3X Stand  3X      Modalities 5/5 5/10 5/13 5/17 5/20 5/24 5/27       15' 15' 15' 15' 15' 15' 15'

## 2021-06-01 ENCOUNTER — OFFICE VISIT (OUTPATIENT)
Dept: PHYSICAL THERAPY | Facility: MEDICAL CENTER | Age: 56
End: 2021-06-01
Payer: COMMERCIAL

## 2021-06-01 DIAGNOSIS — M12.811 ROTATOR CUFF TEAR ARTHROPATHY OF RIGHT SHOULDER: ICD-10-CM

## 2021-06-01 DIAGNOSIS — M25.511 RIGHT SHOULDER PAIN, UNSPECIFIED CHRONICITY: Primary | ICD-10-CM

## 2021-06-01 DIAGNOSIS — M75.101 ROTATOR CUFF TEAR ARTHROPATHY OF RIGHT SHOULDER: ICD-10-CM

## 2021-06-01 PROCEDURE — 97140 MANUAL THERAPY 1/> REGIONS: CPT | Performed by: PHYSICAL THERAPIST

## 2021-06-01 NOTE — PROGRESS NOTES
Daily Note     Today's date: 2021  Patient name: Trang Chew  : 1965  MRN: [id number]  Referring provider: Alexander Key*  Dx:   Encounter Diagnosis     ICD-10-CM    1  Right shoulder pain, unspecified chronicity  M25 511    2  Rotator cuff tear arthropathy of right shoulder  M75 101     M12 811          Subjective: Pt reports that his shoulder has been feeling more tight raising into forward flexion since starting to do the new exercises from last time  Objective: See treatment diary below    Pt instructed to decrease the intensity when performing the BTH stretch  Assessment: Tolerated treatment well  Patient demonstrated fatigue post treatment, exhibited good technique with therapeutic exercises and would benefit from continued PT      Plan: Continue per plan of care        Precautions: s/p R shoulder RC repair      Manuals 5/5 5/10 5/13 5/17 5/20 5/24 5/27 6/1     PROM HK HK HK HK HK HK HK HK     GH jt mobs HK HK HK HK HK HK HK HK     Shoulder str routine      HK HK HK                  Ther Ex 5/5 5/10 5/13 5/17 5/20 5/24 5/27 6/1     Table slide flexion x30 x30 x30 x30 x30 x30 x30 x30     Pulleys  6' 6' 6' 6' 6' 6' 6' 6'     UBE 3F/3B 3F/3B 3F/3B 3F/3B 3F/3B 3F/3B 3F/3B 3F/3B     Scap 4 Blue  3x10 Blue  3x10 Blue  3x10 Blue  3x10 Blk  3x10 blk  3x10 Blk  3x15 Blk  3x15     SL ER 3#  3x10 3#  3x10 3#  3x10 3#  3x10 3#  3x10 4#  3x10 4#  3x10 4#  3x15     BTH ER        5"  x30 5"  x30     BTB IR str       3x30" 3x30"     Belly presses       5"  x30 5"  x30                  Prone pro/ret x30 x30 x30 x30 x30 x30  x30 x30     Wall slides     Sup  And stand  3x10 x30  Sup and stand x30  Sup and stand x30  Sup and stand     Prone raises X 3 3x10 3x10 3x10 3x10 3x10 3x10 3x10 3x10     UE alpha 3#  3X 3#  3X 3#  3X 3#  3x 3#  3X  sup Stand   3X Stand  3X Stand  3X     Modalities 5/5 5/10 5/13 5/17 5/20 5/24 5/27 6/1      15' 15' 15' 15' 15' 15' 15' 15'

## 2021-06-03 ENCOUNTER — OFFICE VISIT (OUTPATIENT)
Dept: PHYSICAL THERAPY | Facility: MEDICAL CENTER | Age: 56
End: 2021-06-03
Payer: COMMERCIAL

## 2021-06-03 DIAGNOSIS — M25.511 RIGHT SHOULDER PAIN, UNSPECIFIED CHRONICITY: Primary | ICD-10-CM

## 2021-06-03 DIAGNOSIS — M12.811 ROTATOR CUFF TEAR ARTHROPATHY OF RIGHT SHOULDER: ICD-10-CM

## 2021-06-03 DIAGNOSIS — M75.101 ROTATOR CUFF TEAR ARTHROPATHY OF RIGHT SHOULDER: ICD-10-CM

## 2021-06-03 PROCEDURE — 97140 MANUAL THERAPY 1/> REGIONS: CPT | Performed by: PHYSICAL THERAPIST

## 2021-06-03 NOTE — PROGRESS NOTES
Daily Note     Today's date: 6/3/2021  Patient name: Dustin Mccain  : 1965  MRN: [id number]  Referring provider: Dangelo Zamora*  Dx:   Encounter Diagnosis     ICD-10-CM    1  Right shoulder pain, unspecified chronicity  M25 511    2  Rotator cuff tear arthropathy of right shoulder  M75 101     M12 811                   Subjective: Pt reports that his shoulder is feeling tight and sore  He has backed off the intensity of the new exercises  Objective: See treatment diary below      Assessment: Tolerated treatment well  Patient demonstrated fatigue post treatment, exhibited good technique with therapeutic exercises and would benefit from continued PT  Plan: Continue per plan of care        Precautions: s/p R shoulder RC repair      Manuals 5/5 5/10 5/13 5/17 5/20 5/24 5/27 6/1 6/3    PROM HK HK HK HK HK HK HK HK HK    GH jt mobs HK HK HK HK HK HK HK HK HK    Shoulder str routine      HK HK HK HK                 Ther Ex 5/5 5/10 5/13 5/17 5/20 5/24 5/27 6/1 6/3    Table slide flexion x30 x30 x30 x30 x30 x30 x30 x30 x30    Pulleys  6' 6' 6' 6' 6' 6' 6' 6' 6'    UBE 3F/3B 3F/3B 3F/3B 3F/3B 3F/3B 3F/3B 3F/3B 3F/3B 3F/3B    Scap 4 Blue  3x10 Blue  3x10 Blue  3x10 Blue  3x10 Blk  3x10 blk  3x10 Blk  3x15 Blk  3x15 blk  3x15    SL ER 3#  3x10 3#  3x10 3#  3x10 3#  3x10 3#  3x10 4#  3x10 4#  3x10 4#  3x15 4#  3x15    BTH ER        5"  x30 5"  x30 5"  x30    BTB IR str       3x30" 3x30" 3x30"    Belly presses       5"  x30 5"  x30 5"  x30                 Prone pro/ret x30 x30 x30 x30 x30 x30  x30 x30 x30    Wall slides     Sup  And stand  3x10 x30  Sup and stand x30  Sup and stand x30  Sup and stand x30  Sup and stand    Prone raises X 3 3x10 3x10 3x10 3x10 3x10 3x10 3x10 3x10 3x10    UE alpha 3#  3X 3#  3X 3#  3X 3#  3x 3#  3X  sup Stand   3X Stand  3X Stand  3X Stand  3X    Modalities 5/5 5/10 5/13 5/17 5/20 5/24 5/27 6/1 6/3     15' 15' 15' 15' 15' 15 15 15' 15'

## 2021-06-07 ENCOUNTER — OFFICE VISIT (OUTPATIENT)
Dept: PHYSICAL THERAPY | Facility: MEDICAL CENTER | Age: 56
End: 2021-06-07
Payer: COMMERCIAL

## 2021-06-07 DIAGNOSIS — M12.811 ROTATOR CUFF TEAR ARTHROPATHY OF RIGHT SHOULDER: ICD-10-CM

## 2021-06-07 DIAGNOSIS — M25.511 RIGHT SHOULDER PAIN, UNSPECIFIED CHRONICITY: Primary | ICD-10-CM

## 2021-06-07 DIAGNOSIS — M75.101 ROTATOR CUFF TEAR ARTHROPATHY OF RIGHT SHOULDER: ICD-10-CM

## 2021-06-07 PROCEDURE — 97140 MANUAL THERAPY 1/> REGIONS: CPT | Performed by: PHYSICAL THERAPIST

## 2021-06-07 NOTE — PROGRESS NOTES
Daily Note     Today's date: 2021  Patient name: Fly Aragon  : 1965  MRN: [id number]  Referring provider: Wayne Elena*  Dx:   Encounter Diagnosis     ICD-10-CM    1  Right shoulder pain, unspecified chronicity  M25 511    2  Rotator cuff tear arthropathy of right shoulder  M75 101     M12 811          Subjective: Pt continues to complain of tightness with motion, particularly in the back and on top of his shoulder  Objective: See treatment diary below      Assessment: Tolerated treatment well  Patient demonstrated fatigue post treatment, exhibited good technique with therapeutic exercises and would benefit from continued PT  PROM and flexibility are slowly improving  Plan: Continue per plan of care        Precautions: s/p R shoulder RC repair      Manuals 5/5 5/10 5/13 5/17 5/20 5/24 5/27 6/1 6/3 6/7   PROM HK HK HK HK HK HK HK HK HK HK   [address] jt mobs HK HK HK HK HK HK HK HK HK HK   Shoulder str routine      HK HK HK HK HK                Ther Ex 5/5 5/10 5/13 5/17 5/20 5/24 5/27 6/1 6/3 6/7   Table slide flexion x30 x30 x30 x30 x30 x30 x30 x30 x30 x30   Pulleys  6' 6' 6' 6' 6' 6' 6' 6' 6' 6'   UBE 3F/3B 3F/3B 3F/3B 3F/3B 3F/3B 3F/3B 3F/3B 3F/3B 3F/3B 3F/3B   Scap 4 Blue  3x10 Blue  3x10 Blue  3x10 Blue  3x10 Blk  3x10 blk  3x10 Blk  3x15 Blk  3x15 blk  3x15 blk  3x15   SL ER 3#  3x10 3#  3x10 3#  3x10 3#  3x10 3#  3x10 4#  3x10 4#  3x10 4#  3x15 4#  3x15 4#  3x15   BTH ER        5"  x30 5"  x30 5"  x30 5"  x30   BTB IR str       3x30" 3x30" 3x30" 3x30"   Belly presses       5"  x30 5"  x30 5"  x30 5"  x30                Prone pro/ret x30 x30 x30 x30 x30 x30  x30 x30 x30 x30   Wall slides     Sup  And stand  3x10 x30  Sup and stand x30  Sup and stand x30  Sup and stand x30  Sup and stand x30  Sup and stand   Prone raises X 3 3x10 3x10 3x10 3x10 3x10 3x10 3x10 3x10 3x10 3x10   UE alpha 3#  3X 3#  3X 3#  3X 3#  3x 3#  3X  sup Stand   3X Stand  3X Stand  3X Stand  3X Stand  3X Modalities 5/5 5/10 5/13 5/17 5/20 5/24 5/27 6/1 6/3 6/7    15' 15' 15' 15' 15' 15' 15' 15' 15' 15'

## 2021-06-10 ENCOUNTER — APPOINTMENT (OUTPATIENT)
Dept: PHYSICAL THERAPY | Facility: MEDICAL CENTER | Age: 56
End: 2021-06-10
Payer: COMMERCIAL

## 2021-06-14 ENCOUNTER — OFFICE VISIT (OUTPATIENT)
Dept: PHYSICAL THERAPY | Facility: MEDICAL CENTER | Age: 56
End: 2021-06-14
Payer: COMMERCIAL

## 2021-06-14 DIAGNOSIS — M25.511 RIGHT SHOULDER PAIN, UNSPECIFIED CHRONICITY: Primary | ICD-10-CM

## 2021-06-14 DIAGNOSIS — M75.101 ROTATOR CUFF TEAR ARTHROPATHY OF RIGHT SHOULDER: ICD-10-CM

## 2021-06-14 DIAGNOSIS — M12.811 ROTATOR CUFF TEAR ARTHROPATHY OF RIGHT SHOULDER: ICD-10-CM

## 2021-06-14 PROCEDURE — 97140 MANUAL THERAPY 1/> REGIONS: CPT | Performed by: PHYSICAL THERAPIST

## 2021-06-14 NOTE — PROGRESS NOTES
Daily Note     Today's date: 2021  Patient name: Arian Hay  : 1965  MRN: [id number]  Referring provider: Ansley Peterson*  Dx:   Encounter Diagnosis     ICD-10-CM    1  Right shoulder pain, unspecified chronicity  M25 511    2  Rotator cuff tear arthropathy of right shoulder  M75 101     M12 811          Subjective: Pt reports that his shoulder has been really sore lately  He states that he had a lot of soreness and fatigue with prolonged driving  Objective: See treatment diary below      Assessment: Tolerated treatment well  Patient demonstrated fatigue post treatment, exhibited good technique with therapeutic exercises and would benefit from continued PT  Despite soreness, pts  PROM continues to improve  Plan: Continue per plan of care        Precautions: s/p R shoulder RC repair      Manuals             PROM HK            GH jt mobs HK            Shoulder str routine HK                         Ther Ex             Table slide flexion x30            Pulleys  6'            UBE 3F/3B            Scap 4 Blk  3x10            SL ER 4#  3x15            BTH ER  5"  x30            BTB IR str 3x30"            Belly presses 5"  x30                         Prone pro/ret x30            Wall slides x30  Sup and stand            Prone raises X 3 3x15            UE alpha stand  3X            Modalities             MH 15'

## 2021-06-17 ENCOUNTER — OFFICE VISIT (OUTPATIENT)
Dept: PHYSICAL THERAPY | Facility: MEDICAL CENTER | Age: 56
End: 2021-06-17
Payer: COMMERCIAL

## 2021-06-17 DIAGNOSIS — M25.511 RIGHT SHOULDER PAIN, UNSPECIFIED CHRONICITY: Primary | ICD-10-CM

## 2021-06-17 DIAGNOSIS — M75.101 ROTATOR CUFF TEAR ARTHROPATHY OF RIGHT SHOULDER: ICD-10-CM

## 2021-06-17 DIAGNOSIS — M12.811 ROTATOR CUFF TEAR ARTHROPATHY OF RIGHT SHOULDER: ICD-10-CM

## 2021-06-17 PROCEDURE — 97140 MANUAL THERAPY 1/> REGIONS: CPT | Performed by: PHYSICAL THERAPIST

## 2021-06-17 NOTE — PROGRESS NOTES
Daily Note     Today's date: 2021  Patient name: Liliana Seals  : 1965  MRN: [id number]  Referring provider: Briana More*  Dx:   Encounter Diagnosis     ICD-10-CM    1  Right shoulder pain, unspecified chronicity  M25 511    2  Rotator cuff tear arthropathy of right shoulder  M75 101     M12 811             Subjective:  Pt reports that his shoulder is less sore  He went to see his chiro and that seemed to help with loosening his shoulder up  Objective: See treatment diary below      Assessment: Tolerated treatment well  Patient demonstrated fatigue post treatment, exhibited good technique with therapeutic exercises and would benefit from continued PT  Plan: Continue per plan of care        Precautions: s/p R shoulder RC repair      Manuals            PROM HK HK           GH jt mobs HK HK           Shoulder str routine HK HK                        Ther Ex            Table slide flexion x30 x30           Pulleys  6' 6'           UBE 3F/3B 3F/3B           Scap 4 Blk  3x10 Blk  3x10           SL ER 4#  3x15 4#  3x15           BTH ER  5"  x30 5"  x30           BTB IR str 3x30" 3x30"           Belly presses 5"  x30 5"  x30                        Prone pro/ret x30 x30           Wall slides x30  Sup and stand x30  Sup and stand           Prone raises X 3 3x15 3x15           UE alpha stand  3X Stand  3X           Modalities            MH 15' 15'

## 2021-06-21 ENCOUNTER — OFFICE VISIT (OUTPATIENT)
Dept: PHYSICAL THERAPY | Facility: MEDICAL CENTER | Age: 56
End: 2021-06-21
Payer: COMMERCIAL

## 2021-06-21 DIAGNOSIS — M12.811 ROTATOR CUFF TEAR ARTHROPATHY OF RIGHT SHOULDER: ICD-10-CM

## 2021-06-21 DIAGNOSIS — M75.101 ROTATOR CUFF TEAR ARTHROPATHY OF RIGHT SHOULDER: ICD-10-CM

## 2021-06-21 DIAGNOSIS — M25.511 RIGHT SHOULDER PAIN, UNSPECIFIED CHRONICITY: Primary | ICD-10-CM

## 2021-06-21 PROCEDURE — 97140 MANUAL THERAPY 1/> REGIONS: CPT | Performed by: PHYSICAL THERAPIST

## 2021-06-21 NOTE — PROGRESS NOTES
Daily Note     Today's date: 2021  Patient name: Dale Noyola  : 1965  MRN: [id number]  Referring provider: Amina Walton*  Dx:   Encounter Diagnosis     ICD-10-CM    1  Right shoulder pain, unspecified chronicity  M25 511    2  Rotator cuff tear arthropathy of right shoulder  M75 101     M12 811          Subjective: Pt reports that his shoulder didn't feel great on Friday, but felt good on Saturday and   Pt still has difficulty reaching out to the side to pick something up  Objective: See treatment diary below      Assessment: Tolerated treatment well  Patient demonstrated fatigue post treatment, exhibited good technique with therapeutic exercises and would benefit from continued PT  Plan: Continue per plan of care        Precautions: s/p R shoulder RC repair      Manuals           PROM [facility]          [address] jt mobs [facility] HK          Shoulder str routine [facility] HK                       Ther Ex           Table slide flexion x30 x30 x30          Pulleys  6' 6' 6'          UBE 3F/3B 3F/3B 3F/3B          Scap 4 Blk  3x10 Blk  3x10 Blk  3x10          SL ER 4#  3x15 4#  3x15 4#  3x15          BTH ER  5"  x30 5"  x30 5"  x30          BTB IR str 3x30" 3x30" 3x30"          Belly presses 5"  x30 5"  x30 5"  x30                       Prone pro/ret x30 x30 x30          Wall slides x30  Sup and stand x30  Sup and stand x30  Sup and stand          Prone raises X 3 3x15 3x15 3x15          UE alpha stand  3X Stand  3X Stand  x3          Modalities            15' 15' 15'

## 2021-06-22 ENCOUNTER — APPOINTMENT (OUTPATIENT)
Dept: PHYSICAL THERAPY | Facility: MEDICAL CENTER | Age: 56
End: 2021-06-22
Payer: COMMERCIAL

## 2021-06-24 ENCOUNTER — OFFICE VISIT (OUTPATIENT)
Dept: PHYSICAL THERAPY | Facility: MEDICAL CENTER | Age: 56
End: 2021-06-24
Payer: COMMERCIAL

## 2021-06-24 DIAGNOSIS — M12.811 ROTATOR CUFF TEAR ARTHROPATHY OF RIGHT SHOULDER: ICD-10-CM

## 2021-06-24 DIAGNOSIS — M25.511 RIGHT SHOULDER PAIN, UNSPECIFIED CHRONICITY: Primary | ICD-10-CM

## 2021-06-24 DIAGNOSIS — M75.101 ROTATOR CUFF TEAR ARTHROPATHY OF RIGHT SHOULDER: ICD-10-CM

## 2021-06-24 PROCEDURE — 97140 MANUAL THERAPY 1/> REGIONS: CPT | Performed by: PHYSICAL THERAPIST

## 2021-06-24 NOTE — PROGRESS NOTES
Daily Note     Today's date: 2021  Patient name: Anushka English  : 1965  MRN: [id number]  Referring provider: Hilda Oglesby*  Dx:   Encounter Diagnosis     ICD-10-CM    1  Right shoulder pain, unspecified chronicity  M25 511    2  Rotator cuff tear arthropathy of right shoulder  M75 101     M12 811             Subjective:  Pt reports that his shoulder has been more sore this week  It has been the most sore is has been in a while  Objective: See treatment diary below      Assessment: Tolerated treatment well  Patient demonstrated fatigue post treatment, exhibited good technique with therapeutic exercises and would benefit from continued PT  A and PROM continue to gradually improve  Plan: Continue per plan of care        Precautions: s/p R shoulder RC repair      Manuals          PROM HK HK HK HK         GH jt mobs HK HK HK HK         Shoulder str routine HK HK HK HK                      Ther Ex          Table slide flexion x30 x30 x30 x30         Pulleys  6' 6' 6' 6'         UBE 3F/3B 3F/3B 3F/3B 3F/3B         Scap 4 Blk  3x10 Blk  3x10 Blk  3x10 Blk  3x10         SL ER 4#  3x15 4#  3x15 4#  3x15 4#  3x15         BTH ER  5"  x30 5"  x30 5"  x30 5"  x30         BTB IR str 3x30" 3x30" 3x30" 3x30"         Belly presses 5"  x30 5"  x30 5"  x30 5"  x30                      Prone pro/ret x30 x30 x30 x30         Wall slides x30  Sup and stand x30  Sup and stand x30  Sup and stand x30  Sup and stand         Prone raises X 3 3x15 3x15 3x15 3x15         UE alpha stand  3X Stand  3X Stand  x3 Stand  3x         Modalities           15' 15' 15' 15'

## 2021-06-24 NOTE — LETTER
2021    Stephanie Mahoney MD  [address] [facility] [address]    Patient: Roderick Knox   YOB: 1965   Date of Visit: 2021     Encounter Diagnosis     ICD-10-CM    1  Right shoulder pain, unspecified chronicity  M25 511    2  Rotator cuff tear arthropathy of right shoulder  M75 101     M12 811        Dear Dr Kamron Powell:    Thank you for your recent referral of Roderick Knox  Please review the attached evaluation summary from Filipe's recent visit  Please verify that you agree with the plan of care by signing the attached order  If you have any questions or concerns, please do not hesitate to call  I sincerely appreciate the opportunity to share in the care of one of your patients and hope to have another opportunity to work with you in the near future  Sincerely,    Petar Oquendo, PT      Referring Provider:      I certify that I have read the below Plan of Care and certify the need for these services furnished under this plan of treatment while under my care  Stephanie Mahoney MD  [address] [facility]  Via Fax: [phone]          Daily Note     Today's date: 2021  Patient name: Roderick Knox  : 1965  MRN: [id number]  Referring provider: Yunior Hernandez*  Dx:   Encounter Diagnosis     ICD-10-CM    1  Right shoulder pain, unspecified chronicity  M25 511    2  Rotator cuff tear arthropathy of right shoulder  M75 101     M12 811             Subjective:  Pt reports that his shoulder has been more sore this week  It has been the most sore is has been in a while  Objective: See treatment diary below      Assessment: Tolerated treatment well  Patient demonstrated fatigue post treatment, exhibited good technique with therapeutic exercises and would benefit from continued PT  A and PROM continue to gradually improve  Plan: Continue per plan of care        Precautions: s/p R shoulder RC repair      Manuals          PROM HK HK HK HK         [address] jt mobs HK HK HK HK         Shoulder str routine HK HK HK HK                      Ther Ex          Table slide flexion x30 x30 x30 x30         Pulleys  6' 6' 6' 6'         UBE 3F/3B 3F/3B 3F/3B 3F/3B         Scap 4 Blk  3x10 Blk  3x10 Blk  3x10 Blk  3x10         SL ER 4#  3x15 4#  3x15 4#  3x15 4#  3x15         BTH ER  5"  x30 5"  x30 5"  x30 5"  x30         BTB IR str 3x30" 3x30" 3x30" 3x30"         Belly presses 5"  x30 5"  x30 5"  x30 5"  x30                      Prone pro/ret x30 x30 x30 x30         Wall slides x30  Sup and stand x30  Sup and stand x30  Sup and stand x30  Sup and stand         Prone raises X 3 3x15 3x15 3x15 3x15         UE alpha stand  3X Stand  3X Stand  x3 Stand  3x         Modalities           15' 15' 15' 15'                PT Re-Evaluation     Today's date: 2021  Patient name: Herbert Cage  : 1965  MRN: [id number]  Referring provider: Ady Logan*  Dx:   Encounter Diagnosis     ICD-10-CM    1  Right shoulder pain, unspecified chronicity  M25 511    2  Rotator cuff tear arthropathy of right shoulder  M75 101     M12 811                   Assessment  Assessment details: Pt is a pleasant 65 yo presenting to physical therapy s/p R RC repair  Pt would benefit from skilled PT to address current impairments and return pt to pre-morbid function  Understanding of Dx/Px/POC: good   Prognosis: good    Pt is improving in all areas, but continues to have difficulty with full AROM  He still lacks full unimpeded shoulder flexion and abduction limiting his function        Goals  Impairment Goals  - Pt I with initial HEP in 1-2 visits    Met   - Improve ROM equal to contralateral side in 6-8 weeks   Not met  - Increase strength to 5/5 in all affected areas in 8+ weeks   Not met    Functional Goals  - Increase FOTO to at least 64 in 12+ weeks   Not met   - Patient will be independent with comprehensive HEP in 6-8 weeks    Not met  - Patient will be able to reach for object from shelf at shoulder height with min reports of difficulty in 6-8 weeks  Not met  - Patient will be able to reach for object overhead with min to difficulty in 8-12 weeks   Not met  - Patient will be able to lift/carry objects without provocation of symptoms in 12+ weeks   Not met   - Patient will be able to return to pre-morbid activity level with min difficulty or discomfort in 12+ weeks   Not met           Plan  Patient would benefit from: skilled physical therapy  Other planned modality interventions: Modalities   Planned therapy interventions: manual therapy, neuromuscular re-education, patient education, strengthening, stretching, therapeutic activities, therapeutic exercise and home exercise program  Frequency: 2x week  Duration in weeks: 8  Treatment plan discussed with: patient        Subjective Evaluation     History of Present Illness  Date of surgery: 2021  Mechanism of injury: Pt failed conservative care and decided to proceed with surgery  2021 he underwent a R shoulder RC repair  He has been wearing his sling regularly and doing the exercises given to him by his surgeon  Update:    Subjective: Pt notes continues improvement, but he still has difficulty with OH activity and when reaching out to the side to pick something up  He cannot fully raise his arm overhead, and it is difficult to get to the full ROM he currently has  Pain  Current pain rating: 3  At best pain ratin  At worst pain ratin     Social Support     Employment status: not working ()  Exercise history:  Wt lifting, exercise, softball    Patient Goals  Patient goals for therapy: decreased pain, increased motion, increased strength, return to sport/leisure activities, independence with ADLs/IADLs and return to work         Objective     Postural Observations    Additional Postural Observation Details  + scapular protraction, anterior tilting and winging B    Increased thoracic kyphosis    Cervical/Thoracic Screen   Cervical range of motion within normal limits  Thoracic range of motion within normal limits with the following exceptions: Decreased thoracic extension    Neurological Testing     Sensation     Shoulder   Left Shoulder   Intact: light touch    Right Shoulder   Intact: light touch    Active Range of Motion   Left Shoulder   Flexion: 150 degrees   Abduction: 160 degrees     Right Shoulder   Flexion: 140 degrees   Abduction: 140 degrees     PROM:     R shoulder:  Flexion: 140  Abduction: 140  ER: 75  IR: 30      Strength/Myotome Testing     Left Shoulder     Planes of Motion   Flexion: 5   Abduction: 5   External rotation at 0°: 5   Internal rotation at 0°: 5     Right Shoulder     Planes of Motion   Flexion: 4+   Abduction: 4+   External rotation at 0°: 5   Internal rotation at 0°: 5     Additional Strength Details  Prone horizontal abduction with palm down: R 3/5 L 5/5  Prone horizontal abduction with thumb up: R 3/5 L 5/5  Prone flexion: R 3/5 L 5/5    Tests     Left Shoulder   Negative scapular relocation  Right Shoulder   Positive scapular relocation

## 2021-06-24 NOTE — PROGRESS NOTES
PT Re-Evaluation     Today's date: 2021  Patient name: Corrie Quiros  : 1965  MRN: [id number]  Referring provider: Gaurang Ochoa*  Dx:   Encounter Diagnosis     ICD-10-CM    1  Right shoulder pain, unspecified chronicity  M25 511    2  Rotator cuff tear arthropathy of right shoulder  M75 101     M12 811                   Assessment  Assessment details: Pt is a pleasant 63 yo presenting to physical therapy s/p R RC repair  Pt would benefit from skilled PT to address current impairments and return pt to pre-morbid function  Understanding of Dx/Px/POC: good   Prognosis: good    Pt is improving in all areas, but continues to have difficulty with full AROM  He still lacks full unimpeded shoulder flexion and abduction limiting his function        Goals  Impairment Goals  - Pt I with initial HEP in 1-2 visits    Met   - Improve ROM equal to contralateral side in 6-8 weeks   Not met  - Increase strength to 5/5 in all affected areas in 8+ weeks   Not met    Functional Goals  - Increase FOTO to at least 64 in 12+ weeks   Not met   - Patient will be independent with comprehensive HEP in 6-8 weeks    Not met  - Patient will be able to reach for object from shelf at shoulder height with min reports of difficulty in 6-8 weeks  Not met  - Patient will be able to reach for object overhead with min to difficulty in 8-12 weeks   Not met  - Patient will be able to lift/carry objects without provocation of symptoms in 12+ weeks   Not met   - Patient will be able to return to pre-morbid activity level with min difficulty or discomfort in 12+ weeks   Not met           Plan  Patient would benefit from: skilled physical therapy  Other planned modality interventions: Modalities   Planned therapy interventions: manual therapy, neuromuscular re-education, patient education, strengthening, stretching, therapeutic activities, therapeutic exercise and home exercise program  Frequency: 2x week  Duration in weeks: 8  Treatment plan discussed with: patient        Subjective Evaluation     History of Present Illness  Date of surgery: 2021  Mechanism of injury: Pt failed conservative care and decided to proceed with surgery  2021 he underwent a R shoulder RC repair  He has been wearing his sling regularly and doing the exercises given to him by his surgeon  Update:    Subjective: Pt notes continues improvement, but he still has difficulty with OH activity and when reaching out to the side to pick something up  He cannot fully raise his arm overhead, and it is difficult to get to the full ROM he currently has  Pain  Current pain rating: 3  At best pain ratin  At worst pain ratin     Social Support     Employment status: not working ()  Exercise history:  Wt lifting, exercise, softball    Patient Goals  Patient goals for therapy: decreased pain, increased motion, increased strength, return to sport/leisure activities, independence with ADLs/IADLs and return to work         Objective     Postural Observations    Additional Postural Observation Details  + scapular protraction, anterior tilting and winging B    Increased thoracic kyphosis    Cervical/Thoracic Screen   Cervical range of motion within normal limits  Thoracic range of motion within normal limits with the following exceptions: Decreased thoracic extension    Neurological Testing     Sensation     Shoulder   Left Shoulder   Intact: light touch    Right Shoulder   Intact: light touch    Active Range of Motion   Left Shoulder   Flexion: 150 degrees   Abduction: 160 degrees     Right Shoulder   Flexion: 140 degrees   Abduction: 140 degrees     PROM:     R shoulder:  Flexion: 140  Abduction: 140  ER: 75  IR: 30      Strength/Myotome Testing     Left Shoulder     Planes of Motion   Flexion: 5   Abduction: 5   External rotation at 0°: 5   Internal rotation at 0°: 5     Right Shoulder     Planes of Motion   Flexion: 4+   Abduction: 4+ External rotation at 0°: 5   Internal rotation at 0°: 5     Additional Strength Details  Prone horizontal abduction with palm down: R 3/5 L 5/5  Prone horizontal abduction with thumb up: R 3/5 L 5/5  Prone flexion: R 3/5 L 5/5    Tests     Left Shoulder   Negative scapular relocation  Right Shoulder   Positive scapular relocation

## 2021-06-28 ENCOUNTER — OFFICE VISIT (OUTPATIENT)
Dept: PHYSICAL THERAPY | Facility: MEDICAL CENTER | Age: 56
End: 2021-06-28
Payer: COMMERCIAL

## 2021-06-28 ENCOUNTER — APPOINTMENT (OUTPATIENT)
Dept: PHYSICAL THERAPY | Facility: MEDICAL CENTER | Age: 56
End: 2021-06-28
Payer: COMMERCIAL

## 2021-06-28 DIAGNOSIS — M12.811 ROTATOR CUFF TEAR ARTHROPATHY OF RIGHT SHOULDER: ICD-10-CM

## 2021-06-28 DIAGNOSIS — M75.101 ROTATOR CUFF TEAR ARTHROPATHY OF RIGHT SHOULDER: ICD-10-CM

## 2021-06-28 DIAGNOSIS — M25.511 RIGHT SHOULDER PAIN, UNSPECIFIED CHRONICITY: Primary | ICD-10-CM

## 2021-06-28 PROCEDURE — 97140 MANUAL THERAPY 1/> REGIONS: CPT | Performed by: PHYSICAL THERAPIST

## 2021-06-28 NOTE — PROGRESS NOTES
Daily Note     Today's date: 2021  Patient name: Corrie Quiros  : 1965  MRN: [id number]  Referring provider: Gaurang Ochoa*  Dx:   Encounter Diagnosis     ICD-10-CM    1  Right shoulder pain, unspecified chronicity  M25 511    2  Rotator cuff tear arthropathy of right shoulder  M75 101     M12 811             Subjective: Pt reports that his shoulder ROM continues to improve  He can now [place] his arm forward and back  This motion feels tight, but previously he was unable to do it  Objective: See treatment diary below      Assessment: Tolerated treatment well  Patient demonstrated fatigue post treatment, exhibited good technique with therapeutic exercises and would benefit from continued PT      Plan: Continue per plan of care        Precautions: s/p R shoulder RC repair      Manuals         PROM HK HK HK HK HK        [address] jt mobs HK HK HK HK HK        Shoulder str routine HK HK HK HK HK                     Ther Ex         Table slide flexion x30 x30 x30 x30 x30        Pulleys  6' 6' 6' 6' 6'        UBE 3F/3B 3F/3B 3F/3B 3F/3B 3F/3B        Scap 4 Blk  3x10 Blk  3x10 Blk  3x10 Blk  3x10 Blk  3x10        SL ER 4#  3x15 4#  3x15 4#  3x15 4#  3x15 4#  3x15        BTH ER  5"  x30 5"  x30 5"  x30 5"  x30 5"  x30        BTB IR str 3x30" 3x30" 3x30" 3x30" 3x30"        Belly presses 5"  x30 5"  x30 5"  x30 5"  x30 5"  x30                     Prone pro/ret x30 x30 x30 x30 x30        Wall slides x30  Sup and stand x30  Sup and stand x30  Sup and stand x30  Sup and stand x30  Sup and stand        Prone raises X 3 3x15 3x15 3x15 3x15 3x15        UE alpha stand  3X Stand  3X Stand  x3 Stand  3x  1# Stand  1#  3X        Modalities          15' 15' 15' 15' 15'

## 2021-07-06 ENCOUNTER — OFFICE VISIT (OUTPATIENT)
Dept: PHYSICAL THERAPY | Facility: MEDICAL CENTER | Age: 56
End: 2021-07-06
Payer: COMMERCIAL

## 2021-07-06 ENCOUNTER — APPOINTMENT (OUTPATIENT)
Dept: PHYSICAL THERAPY | Facility: MEDICAL CENTER | Age: 56
End: 2021-07-06
Payer: COMMERCIAL

## 2021-07-06 DIAGNOSIS — M12.811 ROTATOR CUFF TEAR ARTHROPATHY OF RIGHT SHOULDER: ICD-10-CM

## 2021-07-06 DIAGNOSIS — M75.101 ROTATOR CUFF TEAR ARTHROPATHY OF RIGHT SHOULDER: ICD-10-CM

## 2021-07-06 DIAGNOSIS — M25.511 RIGHT SHOULDER PAIN, UNSPECIFIED CHRONICITY: Primary | ICD-10-CM

## 2021-07-06 PROCEDURE — 97140 MANUAL THERAPY 1/> REGIONS: CPT | Performed by: PHYSICAL THERAPIST

## 2021-07-06 NOTE — PROGRESS NOTES
Daily Note     Today's date: 2021  Patient name: Bear Angulo  : 1965  MRN: [id number]  Referring provider: Kate Cordoba*  Dx:   Encounter Diagnosis     ICD-10-CM    1  Right shoulder pain, unspecified chronicity  M25 511    2  Rotator cuff tear arthropathy of right shoulder  M75 101     M12 811             Subjective: Pt reports that he has notable improvement in his ROM over the past week  He was also able to toss a ball the other day without pain or difficulty  Objective: See treatment diary below      Assessment: Tolerated treatment well  Patient demonstrated fatigue post treatment, exhibited good technique with therapeutic exercises and would benefit from continued PT  Plan: Continue per plan of care        Precautions: s/p R shoulder RC repair      Manuals        PROM HK HK HK HK HK HK       [facility] jt mobs HK HK HK HK HK HK       Shoulder str routine HK HK HK HK HK HK                    Ther Ex 6       Table slide flexion x30 x30 x30 x30 x30        Pulleys  6' 6' 6' 6' 6' 6'       UBE 3F/3B 3F/3B 3F/3B 3F/3B 3F/3B 3F/3B       Scap 4 Blk  3x10 Blk  3x10 Blk  3x10 Blk  3x10 Blk  3x10 BLK  3x10       SL ER 4#  3x15 4#  3x15 4#  3x15 4#  3x15 4#  3x15 4#  3x15       BTH ER  5"  x30 5"  x30 5"  x30 5"  x30 5"  x30 5"  x30       BTB IR str 3x30" 3x30" 3x30" 3x30" 3x30" 3x30"       Belly presses 5"  x30 5"  x30 5"  x30 5"  x30 5"  x30 5"  x30                    Prone pro/ret x30 x30 x30 x30 x30 x30       Wall slides x30  Sup and stand x30  Sup and stand x30  Sup and stand x30  Sup and stand x30  Sup and stand x30       Prone raises X 3 3x15 3x15 3x15 3x15 3x15 3x15       UE alpha stand  3X Stand  3X Stand  x3 Stand  3x  1# Stand  1#  3X 2#  3X       Modalities  76       MH 15' 15' 15' 15' 15' 15'

## 2021-07-08 ENCOUNTER — OFFICE VISIT (OUTPATIENT)
Dept: PHYSICAL THERAPY | Facility: MEDICAL CENTER | Age: 56
End: 2021-07-08
Payer: COMMERCIAL

## 2021-07-08 DIAGNOSIS — M75.101 ROTATOR CUFF TEAR ARTHROPATHY OF RIGHT SHOULDER: ICD-10-CM

## 2021-07-08 DIAGNOSIS — M25.511 RIGHT SHOULDER PAIN, UNSPECIFIED CHRONICITY: Primary | ICD-10-CM

## 2021-07-08 DIAGNOSIS — M12.811 ROTATOR CUFF TEAR ARTHROPATHY OF RIGHT SHOULDER: ICD-10-CM

## 2021-07-08 PROCEDURE — 97140 MANUAL THERAPY 1/> REGIONS: CPT | Performed by: PHYSICAL THERAPIST

## 2021-07-08 NOTE — PROGRESS NOTES
Daily Note     Today's date: 2021  Patient name: Elvira Syed  : 1965  MRN: [id number]  Referring provider: Sj Crow  Dx:   Encounter Diagnosis     ICD-10-CM    1  Right shoulder pain, unspecified chronicity  M25 511    2  Rotator cuff tear arthropathy of right shoulder  M75 101     M12 811          Subjective: Pt reports continued progress with his shoulder  Objective: See treatment diary below      Assessment: Tolerated treatment well  Patient demonstrated fatigue post treatment, exhibited good technique with therapeutic exercises and would benefit from continued PT  Pt is doing well and continues to gradually improve in all areas  His ROM has made significant improvements over the past 2-3 weeks  Plan: Continue per plan of care        Precautions: s/p R shoulder RC repair      Manuals       PROM HK HK HK HK HK HK HK      [facility] jt mobs HK HK HK HK HK HK HK      Shoulder str routine HK HK HK HK HK HK HK                   Ther Ex  7      Table slide flexion x30 x30 x30 x30 x30        Pulleys  6' 6' 6' 6' 6' 6' 6'      UBE 3F/3B 3F/3B 3F/3B 3F/3B 3F/3B 3F/3B 3F/3B      Scap 4 Blk  3x10 Blk  3x10 Blk  3x10 Blk  3x10 Blk  3x10 BLK  3x10 blk  3x10      SL ER 4#  3x15 4#  3x15 4#  3x15 4#  3x15 4#  3x15 4#  3x15 4#  3x15      BTH ER  5"  x30 5"  x30 5"  x30 5"  x30 5"  x30 5"  x30 5"  x30      BTB IR str 3x30" 3x30" 3x30" 3x30" 3x30" 3x30" 3x30"      Belly presses 5"  x30 5"  x30 5"  x30 5"  x30 5"  x30 5"  x30 5"  x30                   Prone pro/ret x30 x30 x30 x30 x30 x30 x30      Wall slides x30  Sup and stand x30  Sup and stand x30  Sup and stand x30  Sup and stand x30  Sup and stand x30 x30  3#  Stand  x30  supine      Prone raises X 3 3x15 3x15 3x15 3x15 3x15 3x15 3x15      UE alpha stand  3X Stand  3X Stand  x3 Stand  3x  1# Stand  1#  3X 2#  3X 3#  3x      Modalities        15' 15' 15' 15' 15' 15' 15'

## 2021-07-12 ENCOUNTER — OFFICE VISIT (OUTPATIENT)
Dept: PHYSICAL THERAPY | Facility: MEDICAL CENTER | Age: 56
End: 2021-07-12
Payer: COMMERCIAL

## 2021-07-12 DIAGNOSIS — M25.511 RIGHT SHOULDER PAIN, UNSPECIFIED CHRONICITY: Primary | ICD-10-CM

## 2021-07-12 DIAGNOSIS — M75.101 ROTATOR CUFF TEAR ARTHROPATHY OF RIGHT SHOULDER: ICD-10-CM

## 2021-07-12 DIAGNOSIS — M12.811 ROTATOR CUFF TEAR ARTHROPATHY OF RIGHT SHOULDER: ICD-10-CM

## 2021-07-12 PROCEDURE — 97140 MANUAL THERAPY 1/> REGIONS: CPT | Performed by: PHYSICAL THERAPIST

## 2021-07-12 NOTE — PROGRESS NOTES
Daily Note     Today's date: 2021  Patient name: Lex Bose  : 1965  MRN: [id number]  Referring provider: Attila Allen  Dx:   Encounter Diagnosis     ICD-10-CM    1  Right shoulder pain, unspecified chronicity  M25 511    2  Rotator cuff tear arthropathy of right shoulder  M75 101     M12 811          Subjective: Pt reports that his shoulder continues to improve  Objective: See treatment diary below      Assessment: Tolerated treatment well  Patient demonstrated fatigue post treatment, exhibited good technique with therapeutic exercises and would benefit from continued PT      Plan: Continue per plan of care        Precautions: s/p R shoulder RC repair      Manuals  7/8      PROM HK HK HK HK HK HK HK      [facility] jt mobs HK HK HK HK HK HK HK      Shoulder str routine HK HK HK HK HK HK HK                   Ther Ex  7/8      Table slide flexion x30 x30 x30 x30 x30        Pulleys  6' 6' 6' 6' 6' 6' 6'      UBE 3F/3B 3F/3B 3F/3B 3F/3B 3F/3B 3F/3B 3F/3B      Scap 4 Blk  3x10 Blk  3x10 Blk  3x10 Blk  3x10 Blk  3x10 BLK  3x10 blk  3x10      SL ER 4#  3x15 4#  3x15 4#  3x15 4#  3x15 4#  3x15 4#  3x15 4#  3x15      BTH ER  5"  x30 5"  x30 5"  x30 5"  x30 5"  x30 5"  x30 5"  x30      BTB IR str 3x30" 3x30" 3x30" 3x30" 3x30" 3x30" 3x30"      Belly presses 5"  x30 5"  x30 5"  x30 5"  x30 5"  x30 5"  x30 5"  x30                   Prone pro/ret x30 x30 x30 x30 x30 x30 x30      Wall slides x30  Sup and stand x30  Sup and stand x30  Sup and stand x30  Sup and stand x30  Sup and stand x30 x30  3#  Stand  x30  supine      Prone raises X 3 3x15 3x15 3x15 3x15 3x15 3x15 3x15      UE alpha stand  3X Stand  3X Stand  x3 Stand  3x  1# Stand  1#  3X 2#  3X 3#  3x      Modalities        15' 15' 15' 15' 15' 15' 15'

## 2021-07-15 ENCOUNTER — OFFICE VISIT (OUTPATIENT)
Dept: PHYSICAL THERAPY | Facility: MEDICAL CENTER | Age: 56
End: 2021-07-15
Payer: COMMERCIAL

## 2021-07-15 DIAGNOSIS — M25.511 RIGHT SHOULDER PAIN, UNSPECIFIED CHRONICITY: Primary | ICD-10-CM

## 2021-07-15 DIAGNOSIS — M75.101 ROTATOR CUFF TEAR ARTHROPATHY OF RIGHT SHOULDER: ICD-10-CM

## 2021-07-15 DIAGNOSIS — M12.811 ROTATOR CUFF TEAR ARTHROPATHY OF RIGHT SHOULDER: ICD-10-CM

## 2021-07-15 PROCEDURE — 97140 MANUAL THERAPY 1/> REGIONS: CPT

## 2021-07-15 NOTE — PROGRESS NOTES
Daily Note     Today's date: 7/15/2021  Patient name: Jethro Canada  : 1965  MRN: [id number]  Referring provider: Zoey Nogueira*  Dx:   Encounter Diagnosis     ICD-10-CM    1  Right shoulder pain, unspecified chronicity  M25 511    2  Rotator cuff tear arthropathy of right shoulder  M75 101     M12 811                   Subjective: Pt reports that he was throwing some soft throws for approx 5 min (20+ throws) at his softball game the other night and was also able to hit the ball one time  Pt states that his arm felt very heavy and felt increasingly heavy by the end of the night  Pt was careful when hitting as well, but did not want to hit more than one ball  Pt states that his arm is sore today  Objective: See treatment diary below      Assessment: Tolerated treatment well  Patient demonstrated fatigue post treatment, exhibited good technique with therapeutic exercises and would benefit from continued PT   Initial tightness with manuals, but decreased with time  Plan: Continue per plan of care        Precautions: s/p R shoulder RC repair      Manuals 6/14 6/17 6/21 6/24 6/28 7/6 7/8 7/15     PROM HK HK HK HK HK HK HK KO     [address] [facility] jt mobs HK HK HK HK HK HK HK np     Shoulder str routine HK HK HK HK HK HK HK KO                  Ther Ex 6/14 6/17 6/21 6/24 6/28 7/6 7/8 7/15     Table slide flexion x30 x30 x30 x30 x30        Pulleys  6' 6' 6' 6' 6' 6' 6' 6'     UBE 3F/3B 3F/3B 3F/3B 3F/3B 3F/3B 3F/3B 3F/3B 3F/3B     Scap 4 Blk  3x10 Blk  3x10 Blk  3x10 Blk  3x10 Blk  3x10 BLK  3x10 blk  3x10 Blk  3x10     SL ER 4#  3x15 4#  3x15 4#  3x15 4#  3x15 4#  3x15 4#  3x15 4#  3x15 4#  3x15     BTH ER  5"  x30 5"  x30 5"  x30 5"  x30 5"  x30 5"  x30 5"  x30 5"  x30     BTB IR str 3x30" 3x30" 3x30" 3x30" 3x30" 3x30" 3x30" 3x30"     Belly presses 5"  x30 5"  x30 5"  x30 5"  x30 5"  x30 5"  x30 5"  x30 5"  x30                  Prone pro/ret x30 x30 x30 x30 x30 x30 x30 x30     Wall slides x30  Sup and stand x30  Sup and stand x30  Sup and stand x30  Sup and stand x30  Sup and stand x30 x30  3#  Stand  x30  supine x30  3#  Stand  x30  supine     Prone raises X 3 3x15 3x15 3x15 3x15 3x15 3x15 3x15 3x15     UE alpha stand  3X Stand  3X Stand  x3 Stand  3x  1# Stand  1#  3X 2#  3X 3#  3x 3#  3x     Modalities 6/14 6/17 6/21 6/24 6/28 7/6 7/8 7/15      15' 15' 15' 15' 15' 15' 15' 15'

## 2021-07-19 ENCOUNTER — OFFICE VISIT (OUTPATIENT)
Dept: PHYSICAL THERAPY | Facility: MEDICAL CENTER | Age: 56
End: 2021-07-19
Payer: COMMERCIAL

## 2021-07-19 DIAGNOSIS — M12.811 ROTATOR CUFF TEAR ARTHROPATHY OF RIGHT SHOULDER: ICD-10-CM

## 2021-07-19 DIAGNOSIS — M75.101 ROTATOR CUFF TEAR ARTHROPATHY OF RIGHT SHOULDER: ICD-10-CM

## 2021-07-19 DIAGNOSIS — M25.511 RIGHT SHOULDER PAIN, UNSPECIFIED CHRONICITY: Primary | ICD-10-CM

## 2021-07-19 PROCEDURE — 97140 MANUAL THERAPY 1/> REGIONS: CPT | Performed by: PHYSICAL THERAPIST

## 2021-07-19 NOTE — PROGRESS NOTES
Daily Note     Today's date: 2021  Patient name: Carlos Villarreal  : 1965  MRN: [id number]  Referring provider: Susana Jade*  Dx:   Encounter Diagnosis     ICD-10-CM    1  Right shoulder pain, unspecified chronicity  M25 511    2  Rotator cuff tear arthropathy of right shoulder  M75 101     M12 811          Subjective: Pt reports doing some increased activity and since then his shoulder has been more tight  He was able to do things that he hadn't been able to do before  Objective: See treatment diary below      Assessment: Tolerated treatment well  Patient demonstrated fatigue post treatment, exhibited good technique with therapeutic exercises and would benefit from continued PT  Pt continues to make gradual improvements  Plan: Continue per plan of care        Precautions: s/p R shoulder RC repair      Manuals 6/14 6/17 6/21 6/24 6/28 7/6 7/8 7/15 7/19    PROM HK HK HK HK HK HK HK KO HK    [address] jt mob[address] HK HK HK HK HK HK HK np HK    Shoulder str routine HK HK HK HK HK HK HK KO HK                 Ther Ex  7 7/8 7/15 7/19    Table slide flexion x30 x30 x30 x30 x30 x30 x30 x30 x30    Pulleys  6' 6' 6' 6' 6' 6' 6' 6' 6'    UBE 3F/3B 3F/3B 3F/3B 3F/3B 3F/3B 3F/3B 3F/3B 3F/3B 3F/3B    Scap 4 Blk  3x10 Blk  3x10 Blk  3x10 Blk  3x10 Blk  3x10 BLK  3x10 blk  3x10 Blk  3x10 Blk  3x10    SL ER 4#  3x15 4#  3x15 4#  3x15 4#  3x15 4#  3x15 4#  3x15 4#  3x15 4#  3x15 4#  3x15    BTH ER  5"  x30 5"  x30 5"  x30 5"  x30 5"  x30 5"  x30 5"  x30 5"  x30 5"  x30    BTB IR str 3x30" 3x30" 3x30" 3x30" 3x30" 3x30" 3x30" 3x30" 3x30"    Belly presses 5"  x30 5"  x30 5"  x30 5"  x30 5"  x30 5"  x30 5"  x30 5"  x30 5"  x30                 Prone pro/ret x30 x30 x30 x30 x30 x30 x30 x30 x30    Wall slides x30  Sup and stand x30  Sup and stand x30  Sup and stand x30  Sup and stand x30  Sup and stand x30 x30  3#  Stand  x30  supine x30  3#  Stand  x30  supine x30  3#  Stand  x30  supine Prone raises X 3 3x15 3x15 3x15 3x15 3x15 3x15 3x15 3x15 3x15    UE alpha stand  3X Stand  3X Stand  x3 Stand  3x  1# Stand  1#  3X 2#  3X 3#  3x 3#  3x 3#  3X    Modalities 6/14 6/17 6/21 6/24 6/28 7/6 7/8 7/15 7/15       15' 15' 15' 15' 15' 15' 15' 15' 15'

## 2021-07-22 ENCOUNTER — OFFICE VISIT (OUTPATIENT)
Dept: PHYSICAL THERAPY | Facility: MEDICAL CENTER | Age: 56
End: 2021-07-22
Payer: COMMERCIAL

## 2021-07-22 DIAGNOSIS — M25.511 RIGHT SHOULDER PAIN, UNSPECIFIED CHRONICITY: Primary | ICD-10-CM

## 2021-07-22 DIAGNOSIS — M12.811 ROTATOR CUFF TEAR ARTHROPATHY OF RIGHT SHOULDER: ICD-10-CM

## 2021-07-22 DIAGNOSIS — M75.101 ROTATOR CUFF TEAR ARTHROPATHY OF RIGHT SHOULDER: ICD-10-CM

## 2021-07-22 PROCEDURE — 97140 MANUAL THERAPY 1/> REGIONS: CPT | Performed by: PHYSICAL THERAPIST

## 2021-07-22 NOTE — PROGRESS NOTES
Daily Note     Today's date: 2021  Patient name: Elvira Syed  : 1965  MRN: [id number]  Referring provider: Sj Guidry  Dx:   Encounter Diagnosis     ICD-10-CM    1  Right shoulder pain, unspecified chronicity  M25 511    2  Rotator cuff tear arthropathy of right shoulder  M75 101     M12 811          Subjective: Pt reports that he tried to throw again and it didn't feel good, so he stopped  He was able to swing without pain, but had some discomfort toward the end of doing soft toss  Objective: See treatment diary below      Assessment: Tolerated treatment well  Patient demonstrated fatigue post treatment, exhibited good technique with therapeutic exercises and would benefit from continued PT   ROM continues to gradually progress  Plan: Continue per plan of care        Precautions: s/p R shoulder RC repair      Manuals 6/14 6/17 6/21 6/24 6/28 7/6 7/8 7/15 7/19 7/22   PROM HK HK HK HK HK HK HK KO HK HK   [facility] jt mobs HK HK HK HK HK HK HK np HK HK   Shoulder str routine HK HK HK HK HK HK HK KO HK HK                Ther Ex  7/6 7/8 7/15 7/19 7/22   Table slide flexion x30 x30 x30 x30 x30 x30 x30 x30 x30 X30   Pulleys  6' 6' 6' 6' 6' 6' 6' 6' 6' 6'   UBE 3F/3B 3F/3B 3F/3B 3F/3B 3F/3B 3F/3B 3F/3B 3F/3B 3F/3B 3F/3B   Scap 4 Blk  3x10 Blk  3x10 Blk  3x10 Blk  3x10 Blk  3x10 BLK  3x10 blk  3x10 Blk  3x10 Blk  3x10 Blk  3x10   SL ER 4#  3x15 4#  3x15 4#  3x15 4#  3x15 4#  3x15 4#  3x15 4#  3x15 4#  3x15 4#  3x15 4#  3x15   BTH ER  5"  x30 5"  x30 5"  x30 5"  x30 5"  x30 5"  x30 5"  x30 5"  x30 5"  x30 5"  x30   BTB IR str 3x30" 3x30" 3x30" 3x30" 3x30" 3x30" 3x30" 3x30" 3x30" 3x30"   Belly presses 5"  x30 5"  x30 5"  x30 5"  x30 5"  x30 5"  x30 5"  x30 5"  x30 5"  x30 5"  x30                Prone pro/ret x30 x30 x30 x30 x30 x30 x30 x30 x30 x30   Wall slides x30  Sup and stand x30  Sup and stand x30  Sup and stand x30  Sup and stand x30  Sup and stand x30 x30  3#  Stand  x30  supine x30  3#  Stand  x30  supine x30  3#  Stand  x30  supine x30  3#  Stand  x30  supine   Prone raises X 3 3x15 3x15 3x15 3x15 3x15 3x15 3x15 3x15 3x15 3x15   UE alpha stand  3X Stand  3X Stand  x3 Stand  3x  1# Stand  1#  3X 2#  3X 3#  3x 3#  3x 3#  3X 3#  3x   Modalities 6/14 6/17 6/21 6/24 6/28 7/6 7/8 7/15 7/15   7/15    15' 15' 15' 15' 15' 15' 15' 15' 15' 15'

## 2021-07-23 ENCOUNTER — OFFICE VISIT (OUTPATIENT)
Dept: PHYSICAL THERAPY | Facility: MEDICAL CENTER | Age: 56
End: 2021-07-23
Payer: COMMERCIAL

## 2021-07-23 DIAGNOSIS — M12.811 ROTATOR CUFF TEAR ARTHROPATHY OF RIGHT SHOULDER: ICD-10-CM

## 2021-07-23 DIAGNOSIS — M75.101 ROTATOR CUFF TEAR ARTHROPATHY OF RIGHT SHOULDER: ICD-10-CM

## 2021-07-23 DIAGNOSIS — M25.511 RIGHT SHOULDER PAIN, UNSPECIFIED CHRONICITY: Primary | ICD-10-CM

## 2021-07-23 PROCEDURE — 97140 MANUAL THERAPY 1/> REGIONS: CPT | Performed by: PHYSICAL THERAPIST

## 2021-07-23 NOTE — PROGRESS NOTES
Daily Note     Today's date: 2021  Patient name: Radu Morales  : 1965  MRN: [id number]  Referring provider: Donn Prabhakar*  Dx:   Encounter Diagnosis     ICD-10-CM    1  Right shoulder pain, unspecified chronicity  M25 511    2  Rotator cuff tear arthropathy of right shoulder  M75 101     M12 811                   Subjective: Pt reports that his shoulder is sore and he thinks it is from throwing earlier in the week  Objective: See treatment diary below      Assessment: Tolerated treatment well  Patient demonstrated fatigue post treatment, exhibited good technique with therapeutic exercises and would benefit from continued PT  Plan: Continue per plan of care        Precautions: s/p R shoulder RC repair      Manuals             PROM HK            [facility] jt mobs HK            Shoulder str routine HK                         Ther Ex             Table slide flexion x30            Pulleys  6'            UBE 3F/3B            Scap 4 Blk  3x10            SL ER 4#  3x15            BTH ER  5"  x30            BTB IR str 3x30"            Belly presses 5"  x30                         Prone pro/ret x30            Wall slides x30  Sup and stand  3#            Prone raises X 3 3x15            UE alpha Stand 3#  3X            Modalities             MH 15'

## 2021-07-26 ENCOUNTER — OFFICE VISIT (OUTPATIENT)
Dept: PHYSICAL THERAPY | Facility: MEDICAL CENTER | Age: 56
End: 2021-07-26
Payer: COMMERCIAL

## 2021-07-26 DIAGNOSIS — M75.101 ROTATOR CUFF TEAR ARTHROPATHY OF RIGHT SHOULDER: ICD-10-CM

## 2021-07-26 DIAGNOSIS — M25.511 RIGHT SHOULDER PAIN, UNSPECIFIED CHRONICITY: Primary | ICD-10-CM

## 2021-07-26 DIAGNOSIS — M12.811 ROTATOR CUFF TEAR ARTHROPATHY OF RIGHT SHOULDER: ICD-10-CM

## 2021-07-26 PROCEDURE — 97140 MANUAL THERAPY 1/> REGIONS: CPT | Performed by: PHYSICAL THERAPIST

## 2021-07-26 NOTE — PROGRESS NOTES
Daily Note     Today's date: 2021  Patient name: Jethro Canada  : 1965  MRN: [id number]  Referring provider: Zoey Nogueira*  Dx:   Encounter Diagnosis     ICD-10-CM    1  Right shoulder pain, unspecified chronicity  M25 511    2  Rotator cuff tear arthropathy of right shoulder  M75 101     M12 811          Subjective: Pt went to see the surgeon on Friday and the visit went well  Dr Ricardo Mayberry was pleased with his progress  Objective: See treatment diary below      Assessment: Tolerated treatment well  Patient demonstrated fatigue post treatment, exhibited good technique with therapeutic exercises and would benefit from continued PT      Plan: Continue per plan of care        Precautions: s/p R shoulder RC repair      Manuals            PROM HK HK           GH jt mobs HK HK           Shoulder str routine HK HK                        Ther Ex            Table slide flexion x30 x30           Pulleys  6' 6'           UBE 3F/3B 3F/3B           Scap 4 Blk  3x10 blk  3x10           SL ER 4#  3x15 4#  3x15           BTH ER  5"  x30 5"  x30           BTB IR str 3x30" 3x30"           Belly presses 5"  x30 5"  x30                        Prone pro/ret x30 x30           Wall slides x30  Sup and stand  3# x30  Sup  And  Stand  3#           Prone raises X 3 3x15 3x15           UE alpha Stand 3#  3X Stand  3#  3X           Modalities            MH 15' 15'

## 2021-07-29 ENCOUNTER — OFFICE VISIT (OUTPATIENT)
Dept: PHYSICAL THERAPY | Facility: MEDICAL CENTER | Age: 56
End: 2021-07-29
Payer: COMMERCIAL

## 2021-07-29 DIAGNOSIS — M12.811 ROTATOR CUFF TEAR ARTHROPATHY OF RIGHT SHOULDER: ICD-10-CM

## 2021-07-29 DIAGNOSIS — M75.101 ROTATOR CUFF TEAR ARTHROPATHY OF RIGHT SHOULDER: ICD-10-CM

## 2021-07-29 DIAGNOSIS — M25.511 RIGHT SHOULDER PAIN, UNSPECIFIED CHRONICITY: Primary | ICD-10-CM

## 2021-07-29 PROCEDURE — 97140 MANUAL THERAPY 1/> REGIONS: CPT | Performed by: PHYSICAL THERAPIST

## 2021-07-29 NOTE — PROGRESS NOTES
Daily Note     Today's date: 2021  Patient name: Valentina Ochoa  : 1965  MRN: [id number]  Referring provider: Dave Guzman  Dx:   Encounter Diagnosis     ICD-10-CM    1  Right shoulder pain, unspecified chronicity  M25 511    2  Rotator cuff tear arthropathy of right shoulder  M75 101     M12 811                   Subjective: Pt reports continued trouble with throwing  He feels like he can't generate enough power to throw well  Objective: See treatment diary below      Assessment: Tolerated treatment well  Patient demonstrated fatigue post treatment, exhibited good technique with therapeutic exercises and would benefit from continued PT  Plan: Continue per plan of care        Precautions: s/p R shoulder RC repair      Manuals           PROM HK HK HK          GH jt mobs HK HK HK          Shoulder str routine HK HK HK                       Ther Ex           Table slide flexion x30 x30 x30          Pulleys  6' 6' 6'          UBE 3F/3B 3F/3B 3F/3B          Scap 4 Blk  3x10 blk  3x10 Blk  3x15          SL ER 4#  3x15 4#  3x15 5#  3x10          BTH ER  5"  x30 5"  x30 5"  x30          BTB IR str 3x30" 3x30" 3x30"          Belly presses 5"  x30 5"  x30 5"  x30                       Prone pro/ret x30 x30 x30          Wall slides x30  Sup and stand  3# x30  Sup  And  Stand  3# x30  Sup  And stand  4#          Prone raises X 3 3x15 3x15 3x15          UE alpha Stand 3#  3X Stand  3#  3X Stand  3#  3x          Modalities            15' 15' 15'

## 2021-08-02 ENCOUNTER — OFFICE VISIT (OUTPATIENT)
Dept: PHYSICAL THERAPY | Facility: MEDICAL CENTER | Age: 56
End: 2021-08-02
Payer: COMMERCIAL

## 2021-08-02 ENCOUNTER — OFFICE VISIT (OUTPATIENT)
Dept: FAMILY MEDICINE CLINIC | Facility: CLINIC | Age: 56
End: 2021-08-02

## 2021-08-02 VITALS
BODY MASS INDEX: 29.97 KG/M2 | HEART RATE: 92 BPM | DIASTOLIC BLOOD PRESSURE: 80 MMHG | SYSTOLIC BLOOD PRESSURE: 120 MMHG | WEIGHT: 241 LBS | HEIGHT: 75 IN

## 2021-08-02 DIAGNOSIS — M75.101 ROTATOR CUFF TEAR ARTHROPATHY OF RIGHT SHOULDER: ICD-10-CM

## 2021-08-02 DIAGNOSIS — M25.511 RIGHT SHOULDER PAIN, UNSPECIFIED CHRONICITY: Primary | ICD-10-CM

## 2021-08-02 DIAGNOSIS — Z02.89 ENCOUNTER FOR FEDERAL AVIATION ADMINISTRATION (FAA) EXAMINATION: Primary | ICD-10-CM

## 2021-08-02 DIAGNOSIS — M12.811 ROTATOR CUFF TEAR ARTHROPATHY OF RIGHT SHOULDER: ICD-10-CM

## 2021-08-02 PROCEDURE — 97140 MANUAL THERAPY 1/> REGIONS: CPT | Performed by: PHYSICAL THERAPIST

## 2021-08-02 PROCEDURE — 99499 UNLISTED E&M SERVICE: CPT | Performed by: FAMILY MEDICINE

## 2021-08-02 NOTE — PROGRESS NOTES
Daily Note     Today's date: 2021  Patient name: Lex Bose  : 1965  MRN: [id number]  Referring provider: Attila Allen*  Dx:   Encounter Diagnosis     ICD-10-CM    1  Right shoulder pain, unspecified chronicity  M25 511    2  Rotator cuff tear arthropathy of right shoulder  M75 101     M12 811          Subjective: Pt reports that he took the weekend off and feels like his shoulder is tight  Pt continues to gradually increase his activity level  Objective: See treatment diary below      Assessment: Tolerated treatment well  Patient demonstrated fatigue post treatment, exhibited good technique with therapeutic exercises and would benefit from continued PT  Plan: Continue per plan of care        Precautions: s/p R shoulder RC repair      Manuals          PROM HK HK HK HK         GH jt mobs HK HK HK HK         Shoulder str routine HK HK HK HK                      Ther Ex          Table slide flexion x30 x30 x30 x30         Pulleys  6' 6' 6' 6'         UBE 3F/3B 3F/3B 3F/3B 3F/3B         Scap 4 Blk  3x10 blk  3x10 Blk  3x15 blk  3x15         SL ER 4#  3x15 4#  3x15 5#  3x10 5#  3x10         BTH ER  5"  x30 5"  x30 5"  x30 5"  x30         BTB IR str 3x30" 3x30" 3x30" 3x30"         Belly presses 5"  x30 5"  x30 5"  x30 5"  x30                      Prone pro/ret x30 x30 x30 x30         Wall slides x30  Sup and stand  3# x30  Sup  And  Stand  3# x30  Sup  And stand  4# x30  Sup   And  Stand  4#         Prone raises X 3 3x15 3x15 3x15 3x15         UE alpha Stand 3#  3X Stand  3#  3X Stand  3#  3x Stand  3#  3x         Modalities          MH 15' 15' 15' 15'

## 2021-08-05 ENCOUNTER — OFFICE VISIT (OUTPATIENT)
Dept: PHYSICAL THERAPY | Facility: MEDICAL CENTER | Age: 56
End: 2021-08-05
Payer: COMMERCIAL

## 2021-08-05 ENCOUNTER — TELEPHONE (OUTPATIENT)
Dept: FAMILY MEDICINE CLINIC | Facility: CLINIC | Age: 56
End: 2021-08-05

## 2021-08-05 DIAGNOSIS — M12.811 ROTATOR CUFF TEAR ARTHROPATHY OF RIGHT SHOULDER: ICD-10-CM

## 2021-08-05 DIAGNOSIS — M25.511 RIGHT SHOULDER PAIN, UNSPECIFIED CHRONICITY: Primary | ICD-10-CM

## 2021-08-05 DIAGNOSIS — M75.101 ROTATOR CUFF TEAR ARTHROPATHY OF RIGHT SHOULDER: ICD-10-CM

## 2021-08-05 PROCEDURE — 97140 MANUAL THERAPY 1/> REGIONS: CPT | Performed by: PHYSICAL THERAPIST

## 2021-08-05 NOTE — PROGRESS NOTES
Daily Note     Today's date: 2021  Patient name: Valentine Tavares  : 1965  MRN: [id number]  Referring provider: Anju Abbasi  Dx:   Encounter Diagnosis     ICD-10-CM    1  Right shoulder pain, unspecified chronicity  M25 511    2  Rotator cuff tear arthropathy of right shoulder  M75 101     M12 811                   Subjective: Pt notes continued improvement, but still has an inability to perform some dynamic activities  Objective: See treatment diary below      Assessment: Tolerated treatment well  Patient demonstrated fatigue post treatment, exhibited good technique with therapeutic exercises and would benefit from continued PT      Plan: Continue per plan of care        Precautions: s/p R shoulder RC repair      Manuals         PROM HK HK HK HK HK        GH jt mobs HK HK HK HK HK        Shoulder str routine HK HK HK HK HK                     Ther Ex         Table slide flexion x30 x30 x30 x30 x30        Pulleys  6' 6' 6' 6' 6'        UBE 3F/3B 3F/3B 3F/3B 3F/3B 3F/3B        Scap 4 Blk  3x10 blk  3x10 Blk  3x15 blk  3x15 Blk  3x15        SL ER 4#  3x15 4#  3x15 5#  3x10 5#  3x10 5#  3x10        BTH ER  5"  x30 5"  x30 5"  x30 5"  x30 5"  x30        BTB IR str 3x30" 3x30" 3x30" 3x30" 3x30"        Belly presses 5"  x30 5"  x30 5"  x30 5"  x30 5"  x30                     Prone pro/ret x30 x30 x30 x30 x30        Wall slides x30  Sup and stand  3# x30  Sup  And  Stand  3# x30  Sup  And stand  4# x30  Sup   And  Stand  4# x30  Sup  And   Stand  4#        Prone raises X 3 3x15 3x15 3x15 3x15 3x15        UE alpha Stand 3#  3X Stand  3#  3X Stand  3#  3x Stand  3#  3x Stand  3#  3x        Modalities          15' 15' 15' 15' 15'

## 2021-08-05 NOTE — TELEPHONE ENCOUNTER
No immediate recommendations at this time    Follow up with dr Tamika Nguyen tomorrow as scheduled for further evaluation, thank you

## 2021-08-05 NOTE — TELEPHONE ENCOUNTER
Pt called with a concern he took notice when he urinates sometimes his urine is clear and then after sitting a little bit in the toilet it turns cloudy   Its not all the time pt only drinks water I did schedule for appt tmw with dr Tamika Nguyen for possible culture pt is having no other sxs or pain please advise

## 2021-08-06 ENCOUNTER — OFFICE VISIT (OUTPATIENT)
Dept: FAMILY MEDICINE CLINIC | Facility: CLINIC | Age: 56
End: 2021-08-06
Payer: COMMERCIAL

## 2021-08-06 VITALS
BODY MASS INDEX: 30.34 KG/M2 | OXYGEN SATURATION: 97 % | HEIGHT: 75 IN | TEMPERATURE: 97.9 F | WEIGHT: 244 LBS | DIASTOLIC BLOOD PRESSURE: 80 MMHG | HEART RATE: 81 BPM | SYSTOLIC BLOOD PRESSURE: 120 MMHG | RESPIRATION RATE: 16 BRPM

## 2021-08-06 DIAGNOSIS — R82.90 CLOUDY URINE: Primary | ICD-10-CM

## 2021-08-06 LAB
SL AMB  POCT GLUCOSE, UA: NORMAL
SL AMB LEUKOCYTE ESTERASE,UA: NORMAL
SL AMB POCT BILIRUBIN,UA: NORMAL
SL AMB POCT BLOOD,UA: NORMAL
SL AMB POCT CLARITY,UA: CLEAR
SL AMB POCT COLOR,UA: YELLOW
SL AMB POCT KETONES,UA: NORMAL
SL AMB POCT NITRITE,UA: NORMAL
SL AMB POCT PH,UA: 7
SL AMB POCT SPECIFIC GRAVITY,UA: 1.01
SL AMB POCT URINE PROTEIN: NORMAL
SL AMB POCT UROBILINOGEN: 0.2

## 2021-08-06 PROCEDURE — 1036F TOBACCO NON-USER: CPT | Performed by: FAMILY MEDICINE

## 2021-08-06 PROCEDURE — 99213 OFFICE O/P EST LOW 20 MIN: CPT | Performed by: FAMILY MEDICINE

## 2021-08-06 PROCEDURE — 3008F BODY MASS INDEX DOCD: CPT | Performed by: FAMILY MEDICINE

## 2021-08-06 PROCEDURE — 3725F SCREEN DEPRESSION PERFORMED: CPT | Performed by: FAMILY MEDICINE

## 2021-08-06 PROCEDURE — 87086 URINE CULTURE/COLONY COUNT: CPT | Performed by: FAMILY MEDICINE

## 2021-08-06 PROCEDURE — 81003 URINALYSIS AUTO W/O SCOPE: CPT | Performed by: FAMILY MEDICINE

## 2021-08-06 NOTE — ASSESSMENT & PLAN NOTE
U/a clear and unremarkable today; will f/u on culture; if persistent will check labs and repeat urine; f/u guidance given; diet modifications given

## 2021-08-06 NOTE — PROGRESS NOTES
Assessment/Plan:     1  Cloudy urine  Assessment & Plan:  U/a clear and unremarkable today; will f/u on culture; if persistent will check labs and repeat urine; f/u guidance given; diet modifications given    Orders:  -     POCT urine dip auto non-scope  -     Urine culture        Subjective:      Patient ID: Reese Villafana is a 64 y o  male  Patient states him and his wife noticed his urine seemed more cloudy recently  He states it is inconsistent  If he sits down to go to the bathroom, he notices it more  He states he had some bloating and soreness but he states he is recovering from shoulder surgery and feels more muscle soreness as he has been more active  Seems to be sore along his pelvic region  He states a lot of time he does not pay attention to pain and feels may be related to working out  Noticed some increase in urinary frequency but not consistent and drinks a lot of water  No dysuria  No dripping or oozing  Only one partner wife  No fevers/chills  States the last 24 hours urine has been clear  He does have a high protein diet and has been doing shakes and bars  The following portions of the patient's history were reviewed and updated as appropriate: allergies, current medications, past family history, past medical history, past social history, past surgical history, and problem list     Review of Systems   Constitutional: Negative for chills and fever  Respiratory: Negative for cough and wheezing  Cardiovascular: Negative  Gastrointestinal: Negative  Musculoskeletal: Positive for back pain           Objective:      /80 (BP Location: Right arm, Patient Position: Sitting, Cuff Size: Adult)   Pulse 81   Temp 97 9 °F (36 6 °C) (Temporal)   Resp 16   Ht 6' 2 8" (1 9 m)   Wt 111 kg (244 lb)   SpO2 97%   BMI 30 66 kg/m²          Physical Exam

## 2021-08-08 LAB — BACTERIA UR CULT: NORMAL

## 2021-08-09 ENCOUNTER — OFFICE VISIT (OUTPATIENT)
Dept: PHYSICAL THERAPY | Facility: MEDICAL CENTER | Age: 56
End: 2021-08-09
Payer: COMMERCIAL

## 2021-08-09 DIAGNOSIS — M12.811 ROTATOR CUFF TEAR ARTHROPATHY OF RIGHT SHOULDER: ICD-10-CM

## 2021-08-09 DIAGNOSIS — M25.511 RIGHT SHOULDER PAIN, UNSPECIFIED CHRONICITY: Primary | ICD-10-CM

## 2021-08-09 DIAGNOSIS — M75.101 ROTATOR CUFF TEAR ARTHROPATHY OF RIGHT SHOULDER: ICD-10-CM

## 2021-08-09 PROCEDURE — 97140 MANUAL THERAPY 1/> REGIONS: CPT | Performed by: PHYSICAL THERAPIST

## 2021-08-09 NOTE — PROGRESS NOTES
Daily Note     Today's date: 2021  Patient name: Marco Quiroz  : 1965  MRN: [id number]  Referring provider: Jose Galvin*  Dx:   Encounter Diagnosis     ICD-10-CM    1  Right shoulder pain, unspecified chronicity  M25 511    2  Rotator cuff tear arthropathy of right shoulder  M75 101     M12 811                   Subjective: Pt reports that his shoulder was painful this weekend, from getting back to normal life  He woke up due to pain a few times  Pt still has some difficulty with throwing  Objective: See treatment diary below    Throwing mechanics reviewed today and suggestions were made to improve mechanics  Assessment: Tolerated treatment well  Patient demonstrated fatigue post treatment, exhibited good technique with therapeutic exercises and would benefit from continued PT      Plan: Continue per plan of care        Precautions: s/p R shoulder RC repair      Manuals        PROM HK HK HK HK HK HK       GH jt mobs HK HK HK HK HK HK       Shoulder str routine HK HK HK HK HK HK                    Ther Ex        Table slide flexion x30 x30 x30 x30 x30 x30       Pulleys  6' 6' 6' 6' 6' 6'       UBE 3F/3B 3F/3B 3F/3B 3F/3B 3F/3B 3F/3B       Scap 4 Blk  3x10 blk  3x10 Blk  3x15 blk  3x15 Blk  3x15 Blk  3x15       SL ER 4#  3x15 4#  3x15 5#  3x10 5#  3x10 5#  3x10 5#  3x10       BTH ER  5"  x30 5"  x30 5"  x30 5"  x30 5"  x30 5"  x30       BTB IR str 3x30" 3x30" 3x30" 3x30" 3x30" 3x30"       Belly presses 5"  x30 5"  x30 5"  x30 5"  x30 5"  x30 5"  x30                    Prone pro/ret x30 x30 x30 x30 x30 x30       Wall slides x30  Sup and stand  3# x30  Sup  And  Stand  3# x30  Sup  And stand  4# x30  Sup   And  Stand  4# x30  Sup  And   Stand  4# x30  Sup and stand  4#       Prone raises X 3 3x15 3x15 3x15 3x15 3x15 2x10  5"       UE alpha Stand 3#  3X Stand  3#  3X Stand  3#  3x Stand  3#  3x Stand  3#  3x Stand  4#  3x Modalities 7/23 7/26 7/29 8/2 8/2 8/9        15' 15' 15' 15' 15' 15'

## 2021-08-11 ENCOUNTER — TELEPHONE (OUTPATIENT)
Dept: FAMILY MEDICINE CLINIC | Facility: CLINIC | Age: 56
End: 2021-08-11

## 2021-08-11 NOTE — TELEPHONE ENCOUNTER
----- Message from Gerlean Aschoff, DO sent at 8/8/2021 10:22 PM EDT -----  Reviewed  Please notify patient his urine culture was negative  Would recommend retesting and possibly checking blood work if continues to occur since was clear in office

## 2021-08-11 NOTE — TELEPHONE ENCOUNTER
----- Message from Hellen Arizmendi DO sent at 8/8/2021 10:22 PM EDT -----  Reviewed  Please notify patient his urine culture was negative  Would recommend retesting and possibly checking blood work if continues to occur since was clear in office

## 2021-08-12 ENCOUNTER — OFFICE VISIT (OUTPATIENT)
Dept: PHYSICAL THERAPY | Facility: MEDICAL CENTER | Age: 56
End: 2021-08-12
Payer: COMMERCIAL

## 2021-08-12 DIAGNOSIS — M75.101 ROTATOR CUFF TEAR ARTHROPATHY OF RIGHT SHOULDER: ICD-10-CM

## 2021-08-12 DIAGNOSIS — M12.811 ROTATOR CUFF TEAR ARTHROPATHY OF RIGHT SHOULDER: ICD-10-CM

## 2021-08-12 DIAGNOSIS — M25.511 RIGHT SHOULDER PAIN, UNSPECIFIED CHRONICITY: Primary | ICD-10-CM

## 2021-08-12 PROCEDURE — 97140 MANUAL THERAPY 1/> REGIONS: CPT | Performed by: PHYSICAL THERAPIST

## 2021-08-12 NOTE — PROGRESS NOTES
Daily Note     Today's date: 2021  Patient name: Mirta Denis  : 1965  MRN: [id number]  Referring provider: Gato Casas  Dx:   Encounter Diagnosis     ICD-10-CM    1  Right shoulder pain, unspecified chronicity  M25 511    2  Rotator cuff tear arthropathy of right shoulder  M75 101     M12 811          Subjective: Pt reports that he is getting some pain with certain activities, but he stops the activity when the pain occurs  He might try it again later, but is moderating his activity based on pain  Objective: See treatment diary below      Assessment: Tolerated treatment well  Patient demonstrated fatigue post treatment, exhibited good technique with therapeutic exercises and would benefit from continued PT  Strength and ROM are normalizing  Plan: Continue per plan of care        Precautions: s/p R shoulder RC repair      Manuals       PROM HK HK HK HK HK HK HK      GH jt mobs HK HK HK HK HK HK HK      Shoulder str routine HK HK HK HK HK HK HK                   Ther Ex       Table slide flexion x30 x30 x30 x30 x30 x30       Pulleys  6' 6' 6' 6' 6' 6' 6'      UBE 3F/3B 3F/3B 3F/3B 3F/3B 3F/3B 3F/3B 3F/3B      Scap 4 Blk  3x10 blk  3x10 Blk  3x15 blk  3x15 Blk  3x15 Blk  3x15 Blk  3x15      SL ER 4#  3x15 4#  3x15 5#  3x10 5#  3x10 5#  3x10 5#  3x10 5#  3x10      BTH ER  5"  x30 5"  x30 5"  x30 5"  x30 5"  x30 5"  x30 5"  x30      BTB IR str 3x30" 3x30" 3x30" 3x30" 3x30" 3x30" 3x30"      Belly presses 5"  x30 5"  x30 5"  x30 5"  x30 5"  x30 5"  x30 5"  x30                   Prone pro/ret x30 x30 x30 x30 x30 x30 x30      Wall slides x30  Sup and stand  3# x30  Sup  And  Stand  3# x30  Sup  And stand  4# x30  Sup   And  Stand  4# x30  Sup  And   Stand  4# x30  Sup and stand  4# x30  Sup  And  Stand  4#      Prone raises X 3 3x15 3x15 3x15 3x15 3x15 2x10  5" 2x10  5"      UE alpha Stand 3#  3X Stand  3#  3X Stand  3#  3x Stand  3#  3x Stand  3#  3x Stand  4#  3x Stand  4#  3X      Modalities 7/23 7/26 7/29 8/2 8/2 8/9 8/12       15' 15' 15' 15' 15' 15' 15'

## 2021-08-16 ENCOUNTER — OFFICE VISIT (OUTPATIENT)
Dept: PHYSICAL THERAPY | Facility: MEDICAL CENTER | Age: 56
End: 2021-08-16
Payer: COMMERCIAL

## 2021-08-16 DIAGNOSIS — M12.811 ROTATOR CUFF TEAR ARTHROPATHY OF RIGHT SHOULDER: ICD-10-CM

## 2021-08-16 DIAGNOSIS — M25.511 RIGHT SHOULDER PAIN, UNSPECIFIED CHRONICITY: Primary | ICD-10-CM

## 2021-08-16 DIAGNOSIS — M75.101 ROTATOR CUFF TEAR ARTHROPATHY OF RIGHT SHOULDER: ICD-10-CM

## 2021-08-16 PROCEDURE — 97140 MANUAL THERAPY 1/> REGIONS: CPT | Performed by: PHYSICAL THERAPIST

## 2021-08-16 NOTE — PROGRESS NOTES
Daily Note     Today's date: 2021  Patient name: Rufus Santos  : 1965  MRN: [id number]  Referring provider: Leo Izquierdo*  Dx:   Encounter Diagnosis     ICD-10-CM    1  Right shoulder pain, unspecified chronicity  M25 511    2  Rotator cuff tear arthropathy of right shoulder  M75 101     M12 811          Subjective: Pt reports that he can now shave his entire head with his R hand  Objective: See treatment diary below      Assessment: Tolerated treatment well  Patient demonstrated fatigue post treatment, exhibited good technique with therapeutic exercises and would benefit from continued PT  Pt continues to steadily progress in all areas  Plan: Continue per plan of care        Precautions: s/p R shoulder RC repair      Manuals      PROM HK HK HK HK HK HK HK HK     GH jt mobs HK HK HK HK HK HK HK HK     Shoulder str routine HK HK HK HK HK HK HK HK                  Ther Ex      Table slide flexion x30 x30 x30 x30 x30 x30 x30 x30     Pulleys  6' 6' 6' 6' 6' 6' 6' 6'     UBE 3F/3B 3F/3B 3F/3B 3F/3B 3F/3B 3F/3B 3F/3B 3F/3B     Scap 4 Blk  3x10 blk  3x10 Blk  3x15 blk  3x15 Blk  3x15 Blk  3x15 Blk  3x15 Blk  3x15     SL ER 4#  3x15 4#  3x15 5#  3x10 5#  3x10 5#  3x10 5#  3x10 5#  3x10 5#  3x15     BTH ER  5"  x30 5"  x30 5"  x30 5"  x30 5"  x30 5"  x30 5"  x30 5"  x30     BTB IR str 3x30" 3x30" 3x30" 3x30" 3x30" 3x30" 3x30" 3x30"     Belly presses 5"  x30 5"  x30 5"  x30 5"  x30 5"  x30 5"  x30 5"  x30 5"  x30                  Prone pro/ret x30 x30 x30 x30 x30 x30 x30 x30     Wall slides x30  Sup and stand  3# x30  Sup  And  Stand  3# x30  Sup  And stand  4# x30  Sup   And  Stand  4# x30  Sup  And   Stand  4# x30  Sup and stand  4# x30  Sup  And  Stand  4# x30  Sup  And  Stand  4#     Prone raises X 3 3x15 3x15 3x15 3x15 3x15 2x10  5" 2x10  5" 5"  x30     UE alpha Stand 3#  3X Stand  3#  3X Stand  3#  3x Stand  3#  3x Stand  3#  3x Stand  4#  3x Stand  4#  3X Stand  4#  3X     Modalities 7/23 7/26 7/29 8/2 8/2 8/9 8/12 8/12      15' 15' 15' 15' 15' 15' 15' 15'

## 2021-08-19 ENCOUNTER — OFFICE VISIT (OUTPATIENT)
Dept: PHYSICAL THERAPY | Facility: MEDICAL CENTER | Age: 56
End: 2021-08-19
Payer: COMMERCIAL

## 2021-08-19 DIAGNOSIS — M75.101 ROTATOR CUFF TEAR ARTHROPATHY OF RIGHT SHOULDER: ICD-10-CM

## 2021-08-19 DIAGNOSIS — M12.811 ROTATOR CUFF TEAR ARTHROPATHY OF RIGHT SHOULDER: ICD-10-CM

## 2021-08-19 DIAGNOSIS — M25.511 RIGHT SHOULDER PAIN, UNSPECIFIED CHRONICITY: Primary | ICD-10-CM

## 2021-08-19 PROCEDURE — 97140 MANUAL THERAPY 1/> REGIONS: CPT | Performed by: PHYSICAL THERAPIST

## 2021-08-19 NOTE — PROGRESS NOTES
Daily Note     Today's date: 2021  Patient name: Kaelyn Guzmán  : 1965  MRN: [id number]  Referring provider: Mei Kam*  Dx:   Encounter Diagnosis     ICD-10-CM    1  Right shoulder pain, unspecified chronicity  M25 511    2  Rotator cuff tear arthropathy of right shoulder  M75 101     M12 811          Subjective: Pt reports that he had some intermittent sharp pain with certain positions  Overall, he is improving  Throwing is getting better, but he still fatigues easily  Objective: See treatment diary below      Assessment: Tolerated treatment well  Patient demonstrated fatigue post treatment, exhibited good technique with therapeutic exercises and would benefit from continued PT  Plan: Continue per plan of care        Precautions: s/p R shoulder RC repair      Manuals     PROM HK HK HK HK HK HK HK HK HK    [address] jt mobs HK HK HK HK HK HK HK HK HK    Shoulder str routine HK HK HK HK HK HK HK HK HK                 Ther Ex     Table slide flexion x30 x30 x30 x30 x30 x30 x30 x30 x30    Pulleys  6' 6' 6' 6' 6' 6' 6' 6' 6'    UBE 3F/3B 3F/3B 3F/3B 3F/3B 3F/3B 3F/3B 3F/3B 3F/3B 3F/3B    Scap 4 Blk  3x10 blk  3x10 Blk  3x15 blk  3x15 Blk  3x15 Blk  3x15 Blk  3x15 Blk  3x15 blk  3x15    SL ER 4#  3x15 4#  3x15 5#  3x10 5#  3x10 5#  3x10 5#  3x10 5#  3x10 5#  3x15 5#  3x15    BTH ER  5"  x30 5"  x30 5"  x30 5"  x30 5"  x30 5"  x30 5"  x30 5"  x30 5"  x30    BTB IR str 3x30" 3x30" 3x30" 3x30" 3x30" 3x30" 3x30" 3x30" 3x30"    Belly presses 5"  x30 5"  x30 5"  x30 5"  x30 5"  x30 5"  x30 5"  x30 5"  x30 5"  x30                 Prone pro/ret x30 x30 x30 x30 x30 x30 x30 x30 x30    Wall slides x30  Sup and stand  3# x30  Sup  And  Stand  3# x30  Sup  And stand  4# x30  Sup   And  Stand  4# x30  Sup  And   Stand  4# x30  Sup and stand  4# x30  Sup  And  Stand  4# x30  Sup  And  Stand  4# x30  Sup  And  Stand  4# Prone raises X 3 3x15 3x15 3x15 3x15 3x15 2x10  5" 2x10  5" 5"  x30 5"  x30    UE alpha Stand 3#  3X Stand  3#  3X Stand  3#  3x Stand  3#  3x Stand  3#  3x Stand  4#  3x Stand  4#  3X Stand  4#  3X Stand  4#  3X    Modalities 7/23 7/26 7/29 8/2 8/2 8/9 8/12 8/12 8/19     15' 15' 15' 15' 15' 15' 15' 15' 15'

## 2021-08-26 ENCOUNTER — OFFICE VISIT (OUTPATIENT)
Dept: PHYSICAL THERAPY | Facility: MEDICAL CENTER | Age: 56
End: 2021-08-26
Payer: COMMERCIAL

## 2021-08-26 DIAGNOSIS — M12.811 ROTATOR CUFF TEAR ARTHROPATHY OF RIGHT SHOULDER: ICD-10-CM

## 2021-08-26 DIAGNOSIS — M25.511 RIGHT SHOULDER PAIN, UNSPECIFIED CHRONICITY: Primary | ICD-10-CM

## 2021-08-26 DIAGNOSIS — M75.101 ROTATOR CUFF TEAR ARTHROPATHY OF RIGHT SHOULDER: ICD-10-CM

## 2021-08-26 PROCEDURE — 97140 MANUAL THERAPY 1/> REGIONS: CPT | Performed by: PHYSICAL THERAPIST

## 2021-08-26 NOTE — PROGRESS NOTES
Daily Note     Today's date: 2021  Patient name: Carlos Mathis  : 1965  MRN: [id number]  Referring provider: Perry Lewis*  Dx:   Encounter Diagnosis     ICD-10-CM    1  Right shoulder pain, unspecified chronicity  M25 511    2  Rotator cuff tear arthropathy of right shoulder  M75 101     M12 811             Subjective: Pt reports that he played too many softball games over the weekend and his shoulder was very fatigued  Objective: See treatment diary below      Assessment: Tolerated treatment well  Patient demonstrated fatigue post treatment, exhibited good technique with therapeutic exercises and would benefit from continued PT  Pt continues to progress with ROM/flexibility and strength at end ranges  Plan: Continue per plan of care        Precautions: s/p R shoulder RC repair      Manuals    PROM HK HK HK HK HK HK HK HK HK HK   [address] jt mobs HK HK HK HK HK HK HK HK HK HK   Shoulder str routine HK HK HK HK HK HK HK HK HK HK                Ther Ex    Table slide flexion x30 x30 x30 x30 x30 x30 x30 x30 x30 x30   Pulleys  6' 6' 6' 6' 6' 6' 6' 6' 6' 6'   UBE 3F/3B 3F/3B 3F/3B 3F/3B 3F/3B 3F/3B 3F/3B 3F/3B 3F/3B 3F/3B   Scap 4 Blk  3x10 blk  3x10 Blk  3x15 blk  3x15 Blk  3x15 Blk  3x15 Blk  3x15 Blk  3x15 blk  3x15 Blk  3x15   SL ER 4#  3x15 4#  3x15 5#  3x10 5#  3x10 5#  3x10 5#  3x10 5#  3x10 5#  3x15 5#  3x15 5#  3x15   BTH ER  5"  x30 5"  x30 5"  x30 5"  x30 5"  x30 5"  x30 5"  x30 5"  x30 5"  x30 5"  x30   BTB IR str 3x30" 3x30" 3x30" 3x30" 3x30" 3x30" 3x30" 3x30" 3x30" 3x30"   Belly presses 5"  x30 5"  x30 5"  x30 5"  x30 5"  x30 5"  x30 5"  x30 5"  x30 5"  x30 5"  x30                Prone pro/ret x30 x30 x30 x30 x30 x30 x30 x30 x30 x30   Wall slides x30  Sup and stand  3# x30  Sup  And  Stand  3# x30  Sup  And stand  4# x30  Sup   And  Stand  4# x30  Sup  And   Stand  4# x30  Sup and stand  4# x30  Sup  And  Stand  4# x30  Sup  And  Stand  4# x30  Sup  And  Stand  4# x30  Sup  And stand  4#   Prone raises X 3 3x15 3x15 3x15 3x15 3x15 2x10  5" 2x10  5" 5"  x30 5"  x30 5"  x30   UE alpha Stand 3#  3X Stand  3#  3X Stand  3#  3x Stand  3#  3x Stand  3#  3x Stand  4#  3x Stand  4#  3X Stand  4#  3X Stand  4#  3X Stand  4#  3X   Modalities 7/23 7/26 7/29 8/2 8/2 8/9 8/12 8/12 8/19 8/26    15' 15' 15' 15' 15' 15' 15' 15' 15' 15'

## 2021-08-30 ENCOUNTER — OFFICE VISIT (OUTPATIENT)
Dept: PHYSICAL THERAPY | Facility: MEDICAL CENTER | Age: 56
End: 2021-08-30
Payer: COMMERCIAL

## 2021-08-30 DIAGNOSIS — M25.511 RIGHT SHOULDER PAIN, UNSPECIFIED CHRONICITY: Primary | ICD-10-CM

## 2021-08-30 DIAGNOSIS — M12.811 ROTATOR CUFF TEAR ARTHROPATHY OF RIGHT SHOULDER: ICD-10-CM

## 2021-08-30 DIAGNOSIS — M75.101 ROTATOR CUFF TEAR ARTHROPATHY OF RIGHT SHOULDER: ICD-10-CM

## 2021-08-30 PROCEDURE — 97140 MANUAL THERAPY 1/> REGIONS: CPT | Performed by: PHYSICAL THERAPIST

## 2021-08-30 NOTE — PROGRESS NOTES
Daily Note     Today's date: 2021  Patient name: Kaelyn Guzmán  : 1965  MRN: [id number]  Referring provider: Mei Kam*  Dx:   Encounter Diagnosis     ICD-10-CM    1  Right shoulder pain, unspecified chronicity  M25 511    2  Rotator cuff tear arthropathy of right shoulder  M75 101     M12 811             Subjective: Pt reports that his shoulder is sore and more stiff today  The only thing he did over the weekend was paint  Objective: See treatment diary below    + TTP R subscap and teres minor    Assessment: Tolerated treatment well  Patient demonstrated fatigue post treatment, exhibited good technique with therapeutic exercises and would benefit from continued PT  Plan: Continue per plan of care        Precautions: s/p R shoulder RC repair      Manuals             PROM HK            GH jt mobs HK            Shoulder str routine HK                         Ther Ex             Table slide flexion x30            Pulleys  6'            UBE 3F/3B            Scap 4 Blk  3x10            SL ER 5#  3x15            BTH ER  5"  x30            BTB IR str 3x30"            Belly presses 5"  x30                         Prone pro/ret x30            Wall slides x30  Sup and stand  4#            Prone raises X 3 5"  x30            UE alpha Stand 4#  3X            Modalities             MH 15'

## 2021-09-03 ENCOUNTER — OFFICE VISIT (OUTPATIENT)
Dept: PHYSICAL THERAPY | Facility: MEDICAL CENTER | Age: 56
End: 2021-09-03
Payer: COMMERCIAL

## 2021-09-03 DIAGNOSIS — M25.511 RIGHT SHOULDER PAIN, UNSPECIFIED CHRONICITY: Primary | ICD-10-CM

## 2021-09-03 DIAGNOSIS — M75.101 ROTATOR CUFF TEAR ARTHROPATHY OF RIGHT SHOULDER: ICD-10-CM

## 2021-09-03 DIAGNOSIS — M12.811 ROTATOR CUFF TEAR ARTHROPATHY OF RIGHT SHOULDER: ICD-10-CM

## 2021-09-03 PROCEDURE — 97140 MANUAL THERAPY 1/> REGIONS: CPT | Performed by: PHYSICAL THERAPIST

## 2021-09-03 NOTE — PROGRESS NOTES
Daily Note     Today's date: 9/3/2021  Patient name: Rg Beal  : 1965  MRN: [id number]  Referring provider: Michael Gonzalez*  Dx:   Encounter Diagnosis     ICD-10-CM    1  Right shoulder pain, unspecified chronicity  M25 511    2  Rotator cuff tear arthropathy of right shoulder  M75 101     M12 811                   Subjective: Pt remains tight and sore, especially with overhead movements  Objective: See treatment diary below      Assessment: Tolerated treatment well  Patient demonstrated fatigue post treatment, exhibited good technique with therapeutic exercises and would benefit from continued PT  Pt had an excellent response to MFR and demonstrated improved ROM with exercise afterward  Plan: Continue per plan of care        Precautions: s/p R shoulder RC repair      Manuals 8/30 9/3           PROM HK HK           GH jt mobs HK HK           Shoulder str routine HK HK           MFR R subscap HK HK           Ther Ex 8/30 9/3           Table slide flexion x30 x30           Pulleys  6' 6'           UBE 3F/3B 3F/3B           Scap 4 Blk  3x10 Blk  3x15           SL ER 5#  3x15 5#  3x15           BTH ER  5"  x30 5"  x30           BTB IR str 3x30" 3x30"           Belly presses 5"  x30 5"  x30                        Prone pro/ret x30 x30           Wall slides x30  Sup and stand  4# x30           Prone raises X 3 5"  x30 5"  x30           UE alpha Stand 4#  3X 4#  Stand  3x           Modalities 8/30 9/3           MH 15' 15'

## 2021-09-09 ENCOUNTER — OFFICE VISIT (OUTPATIENT)
Dept: PHYSICAL THERAPY | Facility: MEDICAL CENTER | Age: 56
End: 2021-09-09
Payer: COMMERCIAL

## 2021-09-09 DIAGNOSIS — M75.101 ROTATOR CUFF TEAR ARTHROPATHY OF RIGHT SHOULDER: ICD-10-CM

## 2021-09-09 DIAGNOSIS — M12.811 ROTATOR CUFF TEAR ARTHROPATHY OF RIGHT SHOULDER: ICD-10-CM

## 2021-09-09 DIAGNOSIS — M25.511 RIGHT SHOULDER PAIN, UNSPECIFIED CHRONICITY: Primary | ICD-10-CM

## 2021-09-09 PROCEDURE — 97140 MANUAL THERAPY 1/> REGIONS: CPT | Performed by: PHYSICAL THERAPIST

## 2021-09-09 NOTE — PROGRESS NOTES
Daily Note     Today's date: 2021  Patient name: Valentina Ochoa  : 1965  MRN: [id number]  Referring provider: Dave Guzman  Dx:   Encounter Diagnosis     ICD-10-CM    1  Right shoulder pain, unspecified chronicity  M25 511    2  Rotator cuff tear arthropathy of right shoulder  M75 101     M12 811                   Subjective: Pt reports that he was doing tree trimming over the weekend and it went well  His shoulder is tighter when he increases R UE use  Objective: See treatment diary below      Assessment: Tolerated treatment well  Patient demonstrated fatigue post treatment, exhibited good technique with therapeutic exercises and would benefit from continued PT  Plan: Continue per plan of care        Precautions: s/p R shoulder RC repair      Manuals 8/30 9/3 9/9          PROM HK HK HK          GH jt mobs HK HK HK          Shoulder str routine HK HK HK          MFR R subscap HK HK HK          Ther Ex 8/30 9/3 9/9          Table slide flexion x30 x30 x30          Pulleys  6' 6' 6'          UBE 3F/3B 3F/3B 3F/3B          Scap 4 Blk  3x10 Blk  3x15 Blk  3x15          SL ER 5#  3x15 5#  3x15 5#  3x15          BTH ER  5"  x30 5"  x30 5"  x30          BTB IR str 3x30" 3x30" 3x30"          Belly presses 5"  x30 5"  x30 5"  x30                       Prone pro/ret x30 x30 x30          Wall slides x30  Sup and stand  4# x30 x30          Prone raises X 3 5"  x30 5"  x30 5"  x30          UE alpha Stand 4#  3X 4#  Stand  3x 4#  Stand  3X          Modalities 8/30 9/3 9/9          MH 15' 15' 15'

## 2021-09-13 ENCOUNTER — OFFICE VISIT (OUTPATIENT)
Dept: PHYSICAL THERAPY | Facility: MEDICAL CENTER | Age: 56
End: 2021-09-13
Payer: COMMERCIAL

## 2021-09-13 DIAGNOSIS — M12.811 ROTATOR CUFF TEAR ARTHROPATHY OF RIGHT SHOULDER: ICD-10-CM

## 2021-09-13 DIAGNOSIS — M75.101 ROTATOR CUFF TEAR ARTHROPATHY OF RIGHT SHOULDER: ICD-10-CM

## 2021-09-13 DIAGNOSIS — M25.511 RIGHT SHOULDER PAIN, UNSPECIFIED CHRONICITY: Primary | ICD-10-CM

## 2021-09-13 PROCEDURE — 97140 MANUAL THERAPY 1/> REGIONS: CPT | Performed by: PHYSICAL THERAPIST

## 2021-09-13 NOTE — PROGRESS NOTES
Daily Note     Today's date: 2021  Patient name: Niurka Alvarez  : 1965  MRN: [id number]  Referring provider: Murtaza Waters*  Dx:   Encounter Diagnosis     ICD-10-CM    1  Right shoulder pain, unspecified chronicity  M25 511    2  Rotator cuff tear arthropathy of right shoulder  M75 101     M12 811          Subjective: Pt reports that his shoulder is really sore today after helping transfer his neighbor  Objective: See treatment diary below      Assessment: Tolerated treatment well  Patient demonstrated fatigue post treatment, exhibited good technique with therapeutic exercises and would benefit from continued PT  Flexibility/ROM continues to gradually improve  Pt with notable increase in ROM post subscap release  Plan: Continue per plan of care        Precautions: s/p R shoulder RC repair      Manuals 8/30 9/3 9/9 9/13         PROM HK HK HK HK         GH jt mobs HK HK HK HK         Shoulder str routine HK HK HK HK         MFR R subscap HK HK HK HK         Ther Ex 8/30 9/3 9/9 9/13         Table slide flexion x30 x30 x30 x30         Pulleys  6' 6' 6' 6'         UBE 3F/3B 3F/3B 3F/3B 3F/3B         Scap 4 Blk  3x10 Blk  3x15 Blk  3x15 blk  3x15         SL ER 5#  3x15 5#  3x15 5#  3x15 5#  3x15         BTH ER  5"  x30 5"  x30 5"  x30 5"  x30         BTB IR str 3x30" 3x30" 3x30" 3x30"         Belly presses 5"  x30 5"  x30 5"  x30 5"  x30                      Prone pro/ret x30 x30 x30 x30         Wall slides x30  Sup and stand  4# x30 x30 x30         Prone raises X 3 5"  x30 5"  x30 5"  x30 5"  x30         UE alpha Stand 4#  3X 4#  Stand  3x 4#  Stand  3X 4#  Stand  3x         Modalities 8/30 9/3 9/9 9/13          15' 15' 15' 15'

## 2021-09-27 ENCOUNTER — OFFICE VISIT (OUTPATIENT)
Dept: PHYSICAL THERAPY | Facility: MEDICAL CENTER | Age: 56
End: 2021-09-27
Payer: COMMERCIAL

## 2021-09-27 DIAGNOSIS — M12.811 ROTATOR CUFF TEAR ARTHROPATHY OF RIGHT SHOULDER: ICD-10-CM

## 2021-09-27 DIAGNOSIS — M75.101 ROTATOR CUFF TEAR ARTHROPATHY OF RIGHT SHOULDER: ICD-10-CM

## 2021-09-27 DIAGNOSIS — M25.511 RIGHT SHOULDER PAIN, UNSPECIFIED CHRONICITY: Primary | ICD-10-CM

## 2021-09-27 PROCEDURE — 97140 MANUAL THERAPY 1/> REGIONS: CPT | Performed by: PHYSICAL THERAPIST

## 2021-09-27 NOTE — PROGRESS NOTES
Daily Note     Today's date: 2021  Patient name: Niurka Alvarez  : 1965  MRN: [id number]  Referring provider: Murtaza Waters*  Dx:   Encounter Diagnosis     ICD-10-CM    1  Right shoulder pain, unspecified chronicity  M25 511    2  Rotator cuff tear arthropathy of right shoulder  M75 101     M12 811          Subjective:  Pt reports that his shoulder is more tight from doing more and not getting stretched for the past 2 weeks  Objective: See treatment diary below      Assessment: Tolerated treatment well  Patient demonstrated fatigue post treatment, exhibited good technique with therapeutic exercises and would benefit from continued PT  Pt responded well to manual therapy today and demonstrated improved PROM post tx  Plan: Continue per plan of care        Precautions: s/p R shoulder RC repair      Manuals 8/30 9/3 9/9 9/13 9/27        PROM HK HK HK HK HK        [facility] jt mobs HK HK HK HK HK        Shoulder str routine HK HK HK HK HK        MFR R subscap HK HK HK HK NP        Ther Ex 8/30 9/3 9/9 9/13 9/27        Table slide flexion x30 x30 x30 x30 x30        Pulleys  6' 6' 6' 6' 6'        UBE 3F/3B 3F/3B 3F/3B 3F/3B 3F/3B        Scap 4 Blk  3x10 Blk  3x15 Blk  3x15 blk  3x15 Blk  3x15        SL ER 5#  3x15 5#  3x15 5#  3x15 5#  3x15 5#  3x15        BTH ER  5"  x30 5"  x30 5"  x30 5"  x30 5"  x30        BTB IR str 3x30" 3x30" 3x30" 3x30" 3x30"        Belly presses 5"  x30 5"  x30 5"  x30 5"  x30 5"  x30                     Prone pro/ret x30 x30 x30 x30 x30        Wall slides x30  Sup and stand  4# x30 x30 x30 x30        Prone raises X 3 5"  x30 5"  x30 5"  x30 5"  x30 5"  x30        UE alpha Stand 4#  3X 4#  Stand  3x 4#  Stand  3X 4#  Stand  3x 4#  Stand  3x        Modalities 8/30 9/3 9/9 9/13 9/27         15' 15' 15' 15' 15'

## 2021-10-04 ENCOUNTER — OFFICE VISIT (OUTPATIENT)
Dept: PHYSICAL THERAPY | Facility: MEDICAL CENTER | Age: 56
End: 2021-10-04
Payer: COMMERCIAL

## 2021-10-04 DIAGNOSIS — M75.101 ROTATOR CUFF TEAR ARTHROPATHY OF RIGHT SHOULDER: ICD-10-CM

## 2021-10-04 DIAGNOSIS — M25.511 RIGHT SHOULDER PAIN, UNSPECIFIED CHRONICITY: Primary | ICD-10-CM

## 2021-10-04 DIAGNOSIS — M12.811 ROTATOR CUFF TEAR ARTHROPATHY OF RIGHT SHOULDER: ICD-10-CM

## 2021-10-04 PROCEDURE — 97140 MANUAL THERAPY 1/> REGIONS: CPT | Performed by: PHYSICAL THERAPIST

## 2021-10-15 ENCOUNTER — OFFICE VISIT (OUTPATIENT)
Dept: PHYSICAL THERAPY | Facility: MEDICAL CENTER | Age: 56
End: 2021-10-15
Payer: COMMERCIAL

## 2021-10-15 DIAGNOSIS — M12.811 ROTATOR CUFF TEAR ARTHROPATHY OF RIGHT SHOULDER: ICD-10-CM

## 2021-10-15 DIAGNOSIS — M25.511 RIGHT SHOULDER PAIN, UNSPECIFIED CHRONICITY: Primary | ICD-10-CM

## 2021-10-15 DIAGNOSIS — M75.101 ROTATOR CUFF TEAR ARTHROPATHY OF RIGHT SHOULDER: ICD-10-CM

## 2021-10-15 PROCEDURE — 97140 MANUAL THERAPY 1/> REGIONS: CPT | Performed by: PHYSICAL THERAPIST

## 2021-10-28 ENCOUNTER — OFFICE VISIT (OUTPATIENT)
Dept: PHYSICAL THERAPY | Facility: MEDICAL CENTER | Age: 56
End: 2021-10-28
Payer: COMMERCIAL

## 2021-10-28 DIAGNOSIS — M75.101 ROTATOR CUFF TEAR ARTHROPATHY OF RIGHT SHOULDER: ICD-10-CM

## 2021-10-28 DIAGNOSIS — M12.811 ROTATOR CUFF TEAR ARTHROPATHY OF RIGHT SHOULDER: ICD-10-CM

## 2021-10-28 DIAGNOSIS — M25.511 RIGHT SHOULDER PAIN, UNSPECIFIED CHRONICITY: Primary | ICD-10-CM

## 2021-10-28 PROCEDURE — 97140 MANUAL THERAPY 1/> REGIONS: CPT | Performed by: PHYSICAL THERAPIST

## 2021-11-08 ENCOUNTER — OFFICE VISIT (OUTPATIENT)
Dept: PHYSICAL THERAPY | Facility: MEDICAL CENTER | Age: 56
End: 2021-11-08
Payer: COMMERCIAL

## 2021-11-08 DIAGNOSIS — M12.811 ROTATOR CUFF TEAR ARTHROPATHY OF RIGHT SHOULDER: ICD-10-CM

## 2021-11-08 DIAGNOSIS — M75.101 ROTATOR CUFF TEAR ARTHROPATHY OF RIGHT SHOULDER: ICD-10-CM

## 2021-11-08 DIAGNOSIS — M25.511 RIGHT SHOULDER PAIN, UNSPECIFIED CHRONICITY: Primary | ICD-10-CM

## 2021-11-08 PROCEDURE — 97140 MANUAL THERAPY 1/> REGIONS: CPT | Performed by: PHYSICAL THERAPIST

## 2021-11-09 ENCOUNTER — TELEPHONE (OUTPATIENT)
Dept: FAMILY MEDICINE CLINIC | Facility: CLINIC | Age: 56
End: 2021-11-09

## 2021-11-16 ENCOUNTER — APPOINTMENT (OUTPATIENT)
Dept: LAB | Facility: MEDICAL CENTER | Age: 56
End: 2021-11-16
Payer: COMMERCIAL

## 2021-11-16 DIAGNOSIS — Z12.5 SCREENING FOR PROSTATE CANCER: ICD-10-CM

## 2021-11-16 DIAGNOSIS — Z13.0 SCREENING FOR DEFICIENCY ANEMIA: ICD-10-CM

## 2021-11-16 DIAGNOSIS — E78.2 MIXED HYPERLIPIDEMIA: ICD-10-CM

## 2021-11-16 LAB
ALBUMIN SERPL BCP-MCNC: 4 G/DL (ref 3.5–5)
ALP SERPL-CCNC: 85 U/L (ref 46–116)
ALT SERPL W P-5'-P-CCNC: 41 U/L (ref 12–78)
ANION GAP SERPL CALCULATED.3IONS-SCNC: 5 MMOL/L (ref 4–13)
AST SERPL W P-5'-P-CCNC: 18 U/L (ref 5–45)
BASOPHILS # BLD AUTO: 0.08 THOUSANDS/ΜL (ref 0–0.1)
BASOPHILS NFR BLD AUTO: 2 % (ref 0–1)
BILIRUB SERPL-MCNC: 0.69 MG/DL (ref 0.2–1)
BUN SERPL-MCNC: 19 MG/DL (ref 5–25)
CALCIUM SERPL-MCNC: 8.9 MG/DL (ref 8.3–10.1)
CHLORIDE SERPL-SCNC: 108 MMOL/L (ref 100–108)
CHOLEST SERPL-MCNC: 193 MG/DL (ref 50–200)
CO2 SERPL-SCNC: 24 MMOL/L (ref 21–32)
CREAT SERPL-MCNC: 0.99 MG/DL (ref 0.6–1.3)
EOSINOPHIL # BLD AUTO: 0.21 THOUSAND/ΜL (ref 0–0.61)
EOSINOPHIL NFR BLD AUTO: 5 % (ref 0–6)
ERYTHROCYTE [DISTWIDTH] IN BLOOD BY AUTOMATED COUNT: 11.9 % (ref 11.6–15.1)
GFR SERPL CREATININE-BSD FRML MDRD: 85 ML/MIN/1.73SQ M
GLUCOSE P FAST SERPL-MCNC: 101 MG/DL (ref 65–99)
HCT VFR BLD AUTO: 49.1 % (ref 36.5–49.3)
HDLC SERPL-MCNC: 47 MG/DL
HGB BLD-MCNC: 17.2 G/DL (ref 12–17)
IMM GRANULOCYTES # BLD AUTO: 0.02 THOUSAND/UL (ref 0–0.2)
IMM GRANULOCYTES NFR BLD AUTO: 1 % (ref 0–2)
LDLC SERPL CALC-MCNC: 120 MG/DL (ref 0–100)
LYMPHOCYTES # BLD AUTO: 1.5 THOUSANDS/ΜL (ref 0.6–4.47)
LYMPHOCYTES NFR BLD AUTO: 34 % (ref 14–44)
MCH RBC QN AUTO: 32.2 PG (ref 26.8–34.3)
MCHC RBC AUTO-ENTMCNC: 35 G/DL (ref 31.4–37.4)
MCV RBC AUTO: 92 FL (ref 82–98)
MONOCYTES # BLD AUTO: 0.62 THOUSAND/ΜL (ref 0.17–1.22)
MONOCYTES NFR BLD AUTO: 14 % (ref 4–12)
NEUTROPHILS # BLD AUTO: 1.98 THOUSANDS/ΜL (ref 1.85–7.62)
NEUTS SEG NFR BLD AUTO: 44 % (ref 43–75)
NRBC BLD AUTO-RTO: 0 /100 WBCS
PLATELET # BLD AUTO: 200 THOUSANDS/UL (ref 149–390)
PMV BLD AUTO: 10.5 FL (ref 8.9–12.7)
POTASSIUM SERPL-SCNC: 4.4 MMOL/L (ref 3.5–5.3)
PROT SERPL-MCNC: 7.3 G/DL (ref 6.4–8.2)
PSA SERPL-MCNC: 0.4 NG/ML (ref 0–4)
RBC # BLD AUTO: 5.34 MILLION/UL (ref 3.88–5.62)
SODIUM SERPL-SCNC: 137 MMOL/L (ref 136–145)
TRIGL SERPL-MCNC: 132 MG/DL
TSH SERPL DL<=0.05 MIU/L-ACNC: 2.51 UIU/ML (ref 0.36–3.74)
WBC # BLD AUTO: 4.41 THOUSAND/UL (ref 4.31–10.16)

## 2021-11-16 PROCEDURE — 80061 LIPID PANEL: CPT

## 2021-11-16 PROCEDURE — 85025 COMPLETE CBC W/AUTO DIFF WBC: CPT

## 2021-11-16 PROCEDURE — 84443 ASSAY THYROID STIM HORMONE: CPT

## 2021-11-16 PROCEDURE — 80053 COMPREHEN METABOLIC PANEL: CPT

## 2021-11-16 PROCEDURE — 36415 COLL VENOUS BLD VENIPUNCTURE: CPT

## 2021-11-16 PROCEDURE — G0103 PSA SCREENING: HCPCS

## 2021-11-22 ENCOUNTER — OFFICE VISIT (OUTPATIENT)
Dept: PHYSICAL THERAPY | Facility: MEDICAL CENTER | Age: 56
End: 2021-11-22
Payer: COMMERCIAL

## 2021-11-22 DIAGNOSIS — M12.811 ROTATOR CUFF TEAR ARTHROPATHY OF RIGHT SHOULDER: ICD-10-CM

## 2021-11-22 DIAGNOSIS — M75.101 ROTATOR CUFF TEAR ARTHROPATHY OF RIGHT SHOULDER: ICD-10-CM

## 2021-11-22 DIAGNOSIS — M25.511 RIGHT SHOULDER PAIN, UNSPECIFIED CHRONICITY: Primary | ICD-10-CM

## 2021-11-22 PROCEDURE — 97140 MANUAL THERAPY 1/> REGIONS: CPT | Performed by: PHYSICAL THERAPIST

## 2021-11-30 ENCOUNTER — OFFICE VISIT (OUTPATIENT)
Dept: FAMILY MEDICINE CLINIC | Facility: CLINIC | Age: 56
End: 2021-11-30
Payer: COMMERCIAL

## 2021-11-30 VITALS
BODY MASS INDEX: 30.09 KG/M2 | HEART RATE: 84 BPM | SYSTOLIC BLOOD PRESSURE: 116 MMHG | WEIGHT: 242 LBS | TEMPERATURE: 98.4 F | HEIGHT: 75 IN | DIASTOLIC BLOOD PRESSURE: 82 MMHG

## 2021-11-30 DIAGNOSIS — Z00.00 ANNUAL PHYSICAL EXAM: Primary | ICD-10-CM

## 2021-11-30 PROCEDURE — 99396 PREV VISIT EST AGE 40-64: CPT | Performed by: FAMILY MEDICINE

## 2022-01-31 ENCOUNTER — APPOINTMENT (OUTPATIENT)
Dept: RADIOLOGY | Facility: MEDICAL CENTER | Age: 57
End: 2022-01-31
Payer: COMMERCIAL

## 2022-01-31 DIAGNOSIS — R05.1 ACUTE COUGH: ICD-10-CM

## 2022-01-31 PROCEDURE — 71046 X-RAY EXAM CHEST 2 VIEWS: CPT

## 2022-02-03 ENCOUNTER — OFFICE VISIT (OUTPATIENT)
Dept: FAMILY MEDICINE CLINIC | Facility: CLINIC | Age: 57
End: 2022-02-03

## 2022-02-03 VITALS
WEIGHT: 243 LBS | HEART RATE: 85 BPM | BODY MASS INDEX: 30.21 KG/M2 | DIASTOLIC BLOOD PRESSURE: 80 MMHG | HEIGHT: 75 IN | SYSTOLIC BLOOD PRESSURE: 120 MMHG

## 2022-02-03 DIAGNOSIS — Z02.89 ENCOUNTER FOR FEDERAL AVIATION ADMINISTRATION (FAA) EXAMINATION: Primary | ICD-10-CM

## 2022-02-03 PROCEDURE — 99499 UNLISTED E&M SERVICE: CPT | Performed by: FAMILY MEDICINE

## 2022-02-03 NOTE — PROGRESS NOTES
Chief Complaint   Patient presents with    Physical Exam     [employer] 1st class ekg correctives no letter

## 2022-05-10 ENCOUNTER — EVALUATION (OUTPATIENT)
Dept: PHYSICAL THERAPY | Facility: MEDICAL CENTER | Age: 57
End: 2022-05-10
Payer: COMMERCIAL

## 2022-05-10 VITALS — SYSTOLIC BLOOD PRESSURE: 124 MMHG | DIASTOLIC BLOOD PRESSURE: 82 MMHG | HEART RATE: 62 BPM | TEMPERATURE: 98.2 F

## 2022-05-10 DIAGNOSIS — G89.29 CHRONIC BILATERAL LOW BACK PAIN WITH BILATERAL SCIATICA: ICD-10-CM

## 2022-05-10 DIAGNOSIS — M54.41 CHRONIC BILATERAL LOW BACK PAIN WITH BILATERAL SCIATICA: ICD-10-CM

## 2022-05-10 DIAGNOSIS — M54.42 CHRONIC BILATERAL LOW BACK PAIN WITH BILATERAL SCIATICA: ICD-10-CM

## 2022-05-10 DIAGNOSIS — M54.16 LUMBAR RADICULOPATHY: Primary | ICD-10-CM

## 2022-05-10 PROCEDURE — 97162 PT EVAL MOD COMPLEX 30 MIN: CPT | Performed by: PHYSICAL THERAPIST

## 2022-05-10 NOTE — TELEPHONE ENCOUNTER
Additional Information   Negative: Is this related to a work injury?  Negative: Is this related to an MVA?  Negative: Are you currently recieving homecare services?  Negative: Has the patient had unexplained weight loss?  Negative: Does the patient have a fever?  Negative: Is the patient experiencing urine retention?  Negative: Is the patient experiencing acute drop foot or paralysis?  Negative: Has the patient experienced major trauma? (fall from height, high speed collision, direct blow to spine) and is also experiencing nausea, light-headedness, or loss of consciousness?  Negative: Is the patient experiencing blood in sputum?     Protocols used: [facility] COMPREHENSIVE SPINE PROGRAM PROTOCOL

## 2022-05-10 NOTE — TELEPHONE ENCOUNTER
Additional Information   Negative: Is this related to a work injury?  Negative: Is this related to an MVA?  Negative: Are you currently recieving homecare services?  Negative: Has the patient had unexplained weight loss?  Negative: Does the patient have a fever?  Negative: Is the patient experiencing urine retention?  Negative: Is the patient experiencing acute drop foot or paralysis?  Negative: Has the patient experienced major trauma? (fall from height, high speed collision, direct blow to spine) and is also experiencing nausea, light-headedness, or loss of consciousness?  Negative: Is the patient experiencing blood in sputum?  Affirmative: Is this a chronic condition?     Protocols used:  [facility] COMPREHENSIVE SPINE PROGRAM PROTOCOL

## 2022-05-10 NOTE — TELEPHONE ENCOUNTER
Additional Information   Negative: Is this related to a work injury?     Protocols used: [facility] COMPREHENSIVE SPINE PROGRAM PROTOCOL

## 2022-05-10 NOTE — TELEPHONE ENCOUNTER
Additional Information   Negative: Is this related to a work injury?  Negative: Is this related to an MVA?  Negative: Are you currently recieving homecare services?  Negative: Has the patient had unexplained weight loss?     Protocols used: [facility] COMPREHENSIVE SPINE PROGRAM PROTOCOL

## 2022-05-10 NOTE — TELEPHONE ENCOUNTER
Additional Information   Negative: Is this related to a work injury?  Negative: Is this related to an MVA?  Negative: Are you currently recieving homecare services?  Negative: Has the patient had unexplained weight loss?  Negative: Does the patient have a fever?  Negative: Is the patient experiencing urine retention?  Negative: Is the patient experiencing acute drop foot or paralysis?     Protocols used: [facility] COMPREHENSIVE SPINE PROGRAM PROTOCOL

## 2022-05-10 NOTE — TELEPHONE ENCOUNTER
Additional Information   Negative: Is this related to a work injury?  Negative: Is this related to an MVA?  Negative: Are you currently recieving homecare services?     Protocols used: [facility] COMPREHENSIVE SPINE PROGRAM PROTOCOL

## 2022-05-10 NOTE — TELEPHONE ENCOUNTER
Additional Information   Negative: Is this related to a work injury?  Negative: Is this related to an MVA?  Negative: Are you currently recieving homecare services?  Negative: Has the patient had unexplained weight loss?  Negative: Does the patient have a fever?  Negative: Is the patient experiencing urine retention?     Protocols used: [facility] COMPREHENSIVE SPINE PROGRAM PROTOCOL

## 2022-05-10 NOTE — PROGRESS NOTES
PT Evaluation     Today's date: 5/10/2022  Patient name: Feroz Steinberg  : 1965  MRN: [id number]  Referring provider: Mily Andersen, JORDAN  Dx:   Encounter Diagnosis     ICD-10-CM    1  Lumbar radiculopathy  M54 16    2  Chronic bilateral low back pain with bilateral sciatica  M54 42     M54 41     G89 29                   Assessment/Plan  Patient is a very pleasant 61 yo male who presents with symptoms of chronic lower back pain and B LE numbing, tingling, and loss of strength  Patient's symptoms have been progressing over the past several months and he is no longer able to run or walk as desired  Patient demonstrates hypomobility throughout lumbar spine which is most likely due to degenerative changes in facet and DDD  Patient's symptoms are consistent with B transverse foraminal stenosis due to advanced DDD  Pelvic discomfort as well as possible urinary retention at times are concerning and may indicate MRI however symptoms are inconsistent and patient seemed to feel a bit better with mobilization of spine as well as stretching of hip flexors  Patient will be given trial of physical therapy services to address his issues and return to higher level of function  If patient's symptoms should worsen or if he does not improve over the next 3 weeks and MRI order will be placed and he will be sent for neurosurgery consultation  2x a week for 3 weeks  Subjective  Patient states that the issues of pain in his lower back and numbing and tingling in his legs worsened over the past several weeks and months  At times he is having issues with heaviness in his legs and severe lower back pain  Patient is concerned about being able to work () and ability to walk, function, and exercise        Objective  Red Flags: possible urinary retention  Pain: 3-8/10 uncomfortable with positions and static standing/sitting   Reflexes: 2/5 BLE  Posture: loss of lumbar lordosis   AROM: limited extension with pain  PROM: deferred  PAROM: decreased with UPA of L2-5 left with comparable sign  Palpation: TTP of posterior lumbar musculature as well as hip flexors B  Special Tests: - slr, - crossed slr, + distraction, + slump test, + estrella test  Coordination of Movement/strengthe: Patient demonstrates poor activation of Transversus Abdominus and multifidus force couple and loss of feed forward mechanism with reaching tasks  Patient was able to perform activation with cueing  B hip flexion contracture more dramatic on left  Strength 5/5 with testing however reports of heaviness and weakness with prolonged walking     Goals:  - Pt I with initial HEP in 1-2 visits  - improved stabilizing structure activation and improved feed forward mechanism with distal activation  - Increase Functional Status Measure measured by TO by [facility]  - walking without issues as desired       Precautions: none

## 2022-05-13 ENCOUNTER — OFFICE VISIT (OUTPATIENT)
Dept: PHYSICAL THERAPY | Facility: MEDICAL CENTER | Age: 57
End: 2022-05-13
Payer: COMMERCIAL

## 2022-05-13 DIAGNOSIS — M54.16 LUMBAR RADICULOPATHY: Primary | ICD-10-CM

## 2022-05-13 DIAGNOSIS — M54.42 CHRONIC BILATERAL LOW BACK PAIN WITH BILATERAL SCIATICA: ICD-10-CM

## 2022-05-13 DIAGNOSIS — M54.41 CHRONIC BILATERAL LOW BACK PAIN WITH BILATERAL SCIATICA: ICD-10-CM

## 2022-05-13 DIAGNOSIS — G89.29 CHRONIC BILATERAL LOW BACK PAIN WITH BILATERAL SCIATICA: ICD-10-CM

## 2022-05-13 PROCEDURE — 97110 THERAPEUTIC EXERCISES: CPT | Performed by: PHYSICAL THERAPIST

## 2022-05-13 PROCEDURE — 97140 MANUAL THERAPY 1/> REGIONS: CPT | Performed by: PHYSICAL THERAPIST

## 2022-05-13 NOTE — PROGRESS NOTES
Daily Note     Today's date: 2022  Patient name: Bandar Parisi  : 1965  MRN: [id number]  Referring provider: Jeremy Shepard, PT  Dx:   Encounter Diagnosis     ICD-10-CM    1  Lumbar radiculopathy  M54 16    2  Chronic bilateral low back pain with bilateral sciatica  M54 42     M54 41     G89 29                   Subjective: patient states that he was playing baseball this week and felt a painful pop in his right calf  Calf is painful and swollen  Lumbar spine is irritated on left with numbing in left foot  Objective: See treatment diary below      Assessment: Tolerated treatment well  Patient exhibited good technique with therapeutic exercises and would benefit from continued PT  Trial of manual therapy and stretching to address issues  Patient will be put on traction next visit if all is well  Plan: Continue per plan of care  Precautions: none             Manuals             L1-5 UPA right Gr   Iv 10sec x5                                                   Neuro Re-Ed                                                                                                        Ther Ex             LTR on  roller 1min x3            Knee to chest stretch 10sec x5            Piriformis stretch 10sec x5            Kneeling hip flexor stretch 10sec x5            Foam roller on wall 5 min                                                                                                                                 Modalities             Mechanical traction

## 2022-05-17 ENCOUNTER — OFFICE VISIT (OUTPATIENT)
Dept: PHYSICAL THERAPY | Facility: MEDICAL CENTER | Age: 57
End: 2022-05-17
Payer: COMMERCIAL

## 2022-05-17 DIAGNOSIS — M54.42 CHRONIC BILATERAL LOW BACK PAIN WITH BILATERAL SCIATICA: ICD-10-CM

## 2022-05-17 DIAGNOSIS — M54.41 CHRONIC BILATERAL LOW BACK PAIN WITH BILATERAL SCIATICA: ICD-10-CM

## 2022-05-17 DIAGNOSIS — G89.29 CHRONIC BILATERAL LOW BACK PAIN WITH BILATERAL SCIATICA: ICD-10-CM

## 2022-05-17 DIAGNOSIS — M54.16 LUMBAR RADICULOPATHY: Primary | ICD-10-CM

## 2022-05-17 PROCEDURE — 97140 MANUAL THERAPY 1/> REGIONS: CPT

## 2022-05-17 PROCEDURE — 97110 THERAPEUTIC EXERCISES: CPT

## 2022-05-17 NOTE — PROGRESS NOTES
Daily Note     Today's date: 2022  Patient name: Enrico Goins  : 1965  MRN: [id number]  Referring provider: Fara Steele PT  Dx:   Encounter Diagnosis     ICD-10-CM    1  Lumbar radiculopathy  M54 16    2  Chronic bilateral low back pain with bilateral sciatica  M54 42     M54 41     G89 29                   Subjective: Pt states that he hasn't experienced any significant changes in his LB and continues with LB stiffness  Pt reports that his calf  is getting better and can now HS when walking, but push off is still a struggle  Pt notes numbness and tightness in his calf to the bottom of his R foot  Objective: See treatment diary below      Assessment: Tolerated treatment well  Patient exhibited good technique with therapeutic exercises and would benefit from continued PT  Slight improvement to his lumbar mobility post manuals  Pt was instructed on a soleus stretch in addition to his gastroc stretch to help with his R calf tightness  Seated nerve glide was also added for his hep  Lumbar traction to be added at NV if all is well  Plan: Continue per plan of care  Precautions: none             Manuals             L1-5 UPA right Gr   Iv 10sec x5 STM  KO                                                  Neuro Re-Ed                                                                                                        Ther Ex             LTR on  roller 1min x3 1 min  x3           Knee to chest stretch 10sec x5 10 sec  x5           Piriformis stretch 10sec x5 10 sec  x5           Kneeling hip flexor stretch 10sec x5 10 sec  x5           Foam roller on wall 5 min 5 min                                                                                                                                Modalities             Mechanical traction

## 2022-05-20 ENCOUNTER — OFFICE VISIT (OUTPATIENT)
Dept: PHYSICAL THERAPY | Facility: MEDICAL CENTER | Age: 57
End: 2022-05-20
Payer: COMMERCIAL

## 2022-05-20 DIAGNOSIS — M54.16 LUMBAR RADICULOPATHY: Primary | ICD-10-CM

## 2022-05-20 PROCEDURE — 97110 THERAPEUTIC EXERCISES: CPT | Performed by: PHYSICAL THERAPIST

## 2022-05-20 PROCEDURE — 97140 MANUAL THERAPY 1/> REGIONS: CPT | Performed by: PHYSICAL THERAPIST

## 2022-05-20 NOTE — PROGRESS NOTES
Daily Note     Today's date: 2022  Patient name: Feroz Steinberg  : 1965  MRN: [id number]  Referring provider: Mily Andersen, PT  Dx:   Encounter Diagnosis     ICD-10-CM    1  Lumbar radiculopathy  M54 16        Start Time: 1200  Stop Time: 05  Total time in clinic (min): 47 minutes    Subjective: Patient states his discomfort remains to his calf, LB  LB stiff  Sometimes I have hard time getting up from the floor  Tried running the other day, was not a good idea  Defiantly one to push through pain  Numbness, pain does radiate down to his R foot  Objective: See treatment diary below  Added EOT hip flexor stretch at edge of high mat table-10''x5    Assessment: Tolerated treatment well  Pt tolerated ex program within lumbar stiff & discomfort to his lumbar  Radicular sx's remains the same  Mild tightness hip flexors, and weaknes  Pt was instructed on a soleus stretch in addition to his gastroc stretch to help with his R calf tightness  Seated nerve glide was also added for his hep  Continue to focus on lumbar stretches,flexiblity, and ROM  Patient exhibited good technique with therapeutic exercises and would benefit from continued PT  Slight improvement to his lumbar mobility post manuals  Plan: Continue per plan of care  Precautions: none             Manuals            L1-5 UPA right Gr   Iv 10sec x5 STM  KO STM   JH                                                 Neuro Re-Ed                                                                                                        Ther Ex             LTR on  roller 1min x3 1 min  x3 1'x3          Knee to chest stretch 10sec x5 10 sec  x5 10''x3          Piriformis stretch 10sec x5 10 sec  x5 10''x5          Kneeling hip flexor stretch 10sec x5 10 sec  x5 10''x5          Foam roller on wall 5 min 5 min 5"                       Hip flexor stretch EOT w/Green strap    10''x5 Modalities             Mechanical traction

## 2022-05-23 ENCOUNTER — OFFICE VISIT (OUTPATIENT)
Dept: PHYSICAL THERAPY | Facility: MEDICAL CENTER | Age: 57
End: 2022-05-23
Payer: COMMERCIAL

## 2022-05-23 DIAGNOSIS — M54.16 LUMBAR RADICULOPATHY: ICD-10-CM

## 2022-05-23 DIAGNOSIS — G89.29 CHRONIC BILATERAL LOW BACK PAIN WITH BILATERAL SCIATICA: Primary | ICD-10-CM

## 2022-05-23 DIAGNOSIS — M54.42 CHRONIC BILATERAL LOW BACK PAIN WITH BILATERAL SCIATICA: Primary | ICD-10-CM

## 2022-05-23 DIAGNOSIS — M54.41 CHRONIC BILATERAL LOW BACK PAIN WITH BILATERAL SCIATICA: Primary | ICD-10-CM

## 2022-05-23 PROCEDURE — 97140 MANUAL THERAPY 1/> REGIONS: CPT | Performed by: PHYSICAL THERAPIST

## 2022-05-23 PROCEDURE — 97110 THERAPEUTIC EXERCISES: CPT | Performed by: PHYSICAL THERAPIST

## 2022-05-23 NOTE — PROGRESS NOTES
Daily Note     Today's date: 2022  Patient name: Swapnil Bowens  : 1965  MRN: [id number]  Referring provider: Taiwo Pearson, PT  Dx:   Encounter Diagnosis     ICD-10-CM    1  Chronic bilateral low back pain with bilateral sciatica  M54 42     M54 41     G89 29    2  Lumbar radiculopathy  M54 16                   Subjective: Patient states his discomfort remains to his calf, LB  LB stiff  Sometimes I have hard time getting up from the floor  Tried running the other day, was not a good idea  Defiantly one to push through pain  Numbness, pain does radiate down to his R foot  Objective: See treatment diary below  Added EOT hip flexor stretch at edge of high mat table-10''x5    Assessment: Tolerated treatment well  Pt tolerated ex program within lumbar stiff & discomfort to his lumbar  Radicular sx's remains the same  Mild tightness hip flexors, and weaknes  Pt was instructed on a soleus stretch in addition to his gastroc stretch to help with his R calf tightness  Seated nerve glide was also added for his hep  Continue to focus on lumbar stretches,flexiblity, and ROM  Patient exhibited good technique with therapeutic exercises and would benefit from continued PT  Slight improvement to his lumbar mobility post manuals  Plan: Continue per plan of care  Precautions: none             Manuals             L1-5 UPA right Gr  Iv 10sec x5                                                   Neuro Re-Ed                                                                                                        Ther Ex             LTR on / roller 1min x3            Knee to chest stretch 10sec x5            Piriformis stretch 10sec x5            Kneeling hip flexor stretch 10sec x5            Foam roller on wall 5 min                         Hip flexor stretch EOT w/Green strap   10sec x5                                                                                                       Modalities Mechanical traction

## 2022-05-25 ENCOUNTER — APPOINTMENT (OUTPATIENT)
Dept: PHYSICAL THERAPY | Facility: MEDICAL CENTER | Age: 57
End: 2022-05-25
Payer: COMMERCIAL

## 2022-05-31 ENCOUNTER — OFFICE VISIT (OUTPATIENT)
Dept: PHYSICAL THERAPY | Facility: MEDICAL CENTER | Age: 57
End: 2022-05-31
Payer: COMMERCIAL

## 2022-05-31 DIAGNOSIS — M54.42 CHRONIC BILATERAL LOW BACK PAIN WITH BILATERAL SCIATICA: Primary | ICD-10-CM

## 2022-05-31 DIAGNOSIS — G89.29 CHRONIC BILATERAL LOW BACK PAIN WITH BILATERAL SCIATICA: Primary | ICD-10-CM

## 2022-05-31 DIAGNOSIS — M54.16 LUMBAR RADICULOPATHY: ICD-10-CM

## 2022-05-31 DIAGNOSIS — M54.41 CHRONIC BILATERAL LOW BACK PAIN WITH BILATERAL SCIATICA: Primary | ICD-10-CM

## 2022-05-31 PROCEDURE — 97140 MANUAL THERAPY 1/> REGIONS: CPT | Performed by: PHYSICAL THERAPIST

## 2022-05-31 PROCEDURE — 97110 THERAPEUTIC EXERCISES: CPT | Performed by: PHYSICAL THERAPIST

## 2022-05-31 NOTE — PROGRESS NOTES
Daily Note     Today's date: 2022  Patient name: Godfrey Norman  : 1965  MRN: [id number]  Referring provider: Albaro Ham, PT  Dx:   Encounter Diagnosis     ICD-10-CM    1  Chronic bilateral low back pain with bilateral sciatica  M54 42     M54 41     G89 29    2  Lumbar radiculopathy  M54 16                   Subjective: Patient states his discomfort in the calf is doing well and is nearly returned to normal   Back symptoms continue to jump around but is doing his stretching and exercises  Objective: See treatment diary below  Added EOT hip flexor stretch at edge of high mat table-10''x5    Assessment: Tolerated treatment well  Pt tolerated ex program within lumbar stiff & discomfort to his lumbar  Radicular sx's remains the same  Mild tightness hip flexors, and weaknes  Mechanical traction trial today which was tolerated well  Plan: Continue per plan of care  Precautions: none             Manuals             L1-5 UPA right Gr  Iv 10sec x5                                                   Neuro Re-Ed                                                                                                        Ther Ex             LTR on  roller 1min x3            Knee to chest stretch 10sec x5            Piriformis stretch 10sec x5            Kneeling hip flexor stretch 10sec x5            Foam roller on wall 5 min                         Hip flexor stretch EOT w/Green strap   10sec x5                                                                                                       Modalities             Mechanical traction 125lbs  30/5

## 2022-06-03 ENCOUNTER — OFFICE VISIT (OUTPATIENT)
Dept: PHYSICAL THERAPY | Facility: MEDICAL CENTER | Age: 57
End: 2022-06-03
Payer: COMMERCIAL

## 2022-06-03 ENCOUNTER — OFFICE VISIT (OUTPATIENT)
Dept: FAMILY MEDICINE CLINIC | Facility: CLINIC | Age: 57
End: 2022-06-03
Payer: COMMERCIAL

## 2022-06-03 VITALS
BODY MASS INDEX: 30.46 KG/M2 | RESPIRATION RATE: 18 BRPM | WEIGHT: 245 LBS | HEIGHT: 75 IN | TEMPERATURE: 98.5 F | OXYGEN SATURATION: 95 % | DIASTOLIC BLOOD PRESSURE: 84 MMHG | SYSTOLIC BLOOD PRESSURE: 126 MMHG | HEART RATE: 99 BPM

## 2022-06-03 DIAGNOSIS — M54.42 CHRONIC BILATERAL LOW BACK PAIN WITH BILATERAL SCIATICA: Primary | ICD-10-CM

## 2022-06-03 DIAGNOSIS — M54.41 CHRONIC BILATERAL LOW BACK PAIN WITH BILATERAL SCIATICA: Primary | ICD-10-CM

## 2022-06-03 DIAGNOSIS — G89.29 CHRONIC BILATERAL LOW BACK PAIN WITH BILATERAL SCIATICA: Primary | ICD-10-CM

## 2022-06-03 DIAGNOSIS — M54.16 LUMBAR RADICULOPATHY: ICD-10-CM

## 2022-06-03 DIAGNOSIS — K13.0 CHEILITIS: Primary | ICD-10-CM

## 2022-06-03 PROCEDURE — 99213 OFFICE O/P EST LOW 20 MIN: CPT | Performed by: FAMILY MEDICINE

## 2022-06-03 PROCEDURE — 97012 MECHANICAL TRACTION THERAPY: CPT | Performed by: PHYSICAL THERAPIST

## 2022-06-03 PROCEDURE — 97140 MANUAL THERAPY 1/> REGIONS: CPT | Performed by: PHYSICAL THERAPIST

## 2022-06-03 PROCEDURE — 97110 THERAPEUTIC EXERCISES: CPT | Performed by: PHYSICAL THERAPIST

## 2022-06-03 RX ORDER — VALACYCLOVIR HYDROCHLORIDE 1 G/1
1000 TABLET, FILM COATED ORAL 2 TIMES DAILY
Qty: 14 TABLET | Refills: 0 | Status: SHIPPED | OUTPATIENT
Start: 2022-06-03 | End: 2022-06-03 | Stop reason: SDUPTHER

## 2022-06-03 RX ORDER — VALACYCLOVIR HYDROCHLORIDE 1 G/1
2000 TABLET, FILM COATED ORAL 2 TIMES DAILY
Qty: 4 TABLET | Refills: 0 | Status: SHIPPED | OUTPATIENT
Start: 2022-06-03 | End: 2022-06-15

## 2022-06-03 NOTE — PROGRESS NOTES
Daily Note     Today's date: 6/3/2022  Patient name: Tonia Rosa  : 1965  MRN: [id number]  Referring provider: Indu Marin PT  Dx:   Encounter Diagnosis     ICD-10-CM    1  Chronic bilateral low back pain with bilateral sciatica  M54 42     M54 41     G89 29    2  Lumbar radiculopathy  M54 16                   Subjective: Patient states he has been doing a bit better, but continues to have the same symptoms of LE tingling and feels he doesn't have the explosive power in his legs  Objective: See treatment diary below      Assessment: Tolerated treatment well  Pt tolerated ex program within lumbar stiff & discomfort to his lumbar  Radicular sx's remains the same  Mild tightness hip flexors, and weaknes  Mechanical traction trial today which was tolerated well  Plan: Continue per plan of care  Precautions: none             Manuals             L1-5 UPA right Gr  Iv 10sec x5                                                   Neuro Re-Ed                                                                                                        Ther Ex             LTR on  roller 1min x3            Knee to chest stretch 10sec x5            Piriformis stretch 10sec x5            Kneeling hip flexor stretch 10sec x5            Foam roller on wall 5 min                         Hip flexor stretch EOT w/Green strap   10sec x5                                                                                                       Modalities             Mechanical traction 135lbs  30/5

## 2022-06-03 NOTE — PROGRESS NOTES
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 given sun exposure for onset will add Valtrex; f/u guidance given if no improvement    Orders:  -     mupirocin (BACTROBAN) 2 % ointment; Apply topically 3 (three) times a day  -     valACYclovir (VALTREX) 1,000 mg tablet; Take 2 tablets (2,000 mg total) by mouth 2 (two) times a day for 1 day        Subjective:      Patient ID: Paras Finch is a 62 y o  male  Symptoms seemed to have started about 2 weeks ago  Complains of lip burning  Thought it started at chapped lips or had sunburn  Peels at times and is irritated  Does not seem to go away  Does burn  Patient has been using Blistex  Does not feel like his usual HSV outbreaks  Thought it might have been from being out in sun in [place]  Concerned as it seems to be worsening and not improving  The following portions of the patient's history were reviewed and updated as appropriate: allergies, current medications, past family history, past medical history, past social history, past surgical history, and problem list     Review of Systems   Constitutional: Negative for chills and fever  Objective:      /84 (BP Location: Left arm, Patient Position: Sitting, Cuff Size: Adult)   Pulse 99   Temp 98 5 °F (36 9 °C) (Temporal)   Resp 18   Ht 6' 3" (1 905 m)   Wt 111 kg (245 lb)   SpO2 95%   BMI 30 62 kg/m²          Physical Exam  Vitals reviewed  Constitutional:       General: He is not in acute distress  Appearance: Normal appearance  He is not ill-appearing, toxic-appearing or diaphoretic  HENT:      Head: Normocephalic and atraumatic  Mouth/Throat:      Tongue: No lesions  Pharynx: Oropharynx is clear  Eyes:      General: No scleral icterus          Right eye: No discharge  Left eye: No discharge  Conjunctiva/sclera: Conjunctivae normal    Cardiovascular:      Rate and Rhythm: Normal rate and regular rhythm  Pulses: Normal pulses  Heart sounds: Normal heart sounds  No murmur heard  No gallop  Pulmonary:      Effort: Pulmonary effort is normal  No respiratory distress  Breath sounds: Normal breath sounds  No stridor  No wheezing, rhonchi or rales  Musculoskeletal:      Right lower leg: No edema  Left lower leg: No edema  Neurological:      General: No focal deficit present  Mental Status: He is alert and oriented to person, place, and time  Psychiatric:         Mood and Affect: Mood normal          Behavior: Behavior normal          Thought Content:  Thought content normal          Judgment: Judgment normal

## 2022-06-03 NOTE — ASSESSMENT & PLAN NOTE
Suspect cheilitis; no change in topical treatments or toothpaste as cause; advised on avoiding irritants; c/w emollient; will add topical antibacterial in case superimposed infection; does have hx of HSV given sun exposure for onset will add Valtrex; f/u guidance given if no improvement

## 2022-06-08 ENCOUNTER — OFFICE VISIT (OUTPATIENT)
Dept: PHYSICAL THERAPY | Facility: MEDICAL CENTER | Age: 57
End: 2022-06-08
Payer: COMMERCIAL

## 2022-06-08 DIAGNOSIS — M54.41 CHRONIC BILATERAL LOW BACK PAIN WITH BILATERAL SCIATICA: ICD-10-CM

## 2022-06-08 DIAGNOSIS — G89.29 CHRONIC BILATERAL LOW BACK PAIN WITH BILATERAL SCIATICA: ICD-10-CM

## 2022-06-08 DIAGNOSIS — M54.42 CHRONIC BILATERAL LOW BACK PAIN WITH BILATERAL SCIATICA: ICD-10-CM

## 2022-06-08 DIAGNOSIS — M54.16 LUMBAR RADICULOPATHY: Primary | ICD-10-CM

## 2022-06-08 PROCEDURE — 97012 MECHANICAL TRACTION THERAPY: CPT | Performed by: PHYSICAL THERAPIST

## 2022-06-08 PROCEDURE — 97140 MANUAL THERAPY 1/> REGIONS: CPT | Performed by: PHYSICAL THERAPIST

## 2022-06-08 PROCEDURE — 97110 THERAPEUTIC EXERCISES: CPT | Performed by: PHYSICAL THERAPIST

## 2022-06-08 NOTE — PROGRESS NOTES
Daily Note     Today's date: 2022  Patient name: Lori Rodgers  : 1965  MRN: [id number]  Referring provider: Dianne Quinn PT  Dx:   Encounter Diagnosis     ICD-10-CM    1  Lumbar radiculopathy  M54 16    2  Chronic bilateral low back pain with bilateral sciatica  M54 42     M54 41     G89 29                   Subjective: Patient states he has been doing a bit better  Objective: See treatment diary below      Assessment: Tolerated treatment well  Pt tolerated ex program within lumbar stiff & discomfort to his lumbar  Radicular sx's remains the same  Mild tightness hip flexors, and weaknes  Plan: Continue per plan of care  Precautions: none             Manuals             L1-5 UPA right Gr  Iv 10sec x5                                                   Neuro Re-Ed                                                                                                        Ther Ex             LTR on  roller 1min x3            Knee to chest stretch 10sec x5            Piriformis stretch 10sec x5            Kneeling hip flexor stretch 10sec x5            Foam roller on wall 5 min                         Hip flexor stretch EOT w/Green strap   10sec x5                                                                                                       Modalities             Mechanical traction 155lbs  30/5  10min

## 2022-06-09 ENCOUNTER — OFFICE VISIT (OUTPATIENT)
Dept: PHYSICAL THERAPY | Facility: MEDICAL CENTER | Age: 57
End: 2022-06-09
Payer: COMMERCIAL

## 2022-06-09 DIAGNOSIS — M54.42 CHRONIC BILATERAL LOW BACK PAIN WITH BILATERAL SCIATICA: ICD-10-CM

## 2022-06-09 DIAGNOSIS — G89.29 CHRONIC BILATERAL LOW BACK PAIN WITH BILATERAL SCIATICA: ICD-10-CM

## 2022-06-09 DIAGNOSIS — M54.41 CHRONIC BILATERAL LOW BACK PAIN WITH BILATERAL SCIATICA: ICD-10-CM

## 2022-06-09 DIAGNOSIS — M54.16 LUMBAR RADICULOPATHY: Primary | ICD-10-CM

## 2022-06-09 PROCEDURE — 97110 THERAPEUTIC EXERCISES: CPT | Performed by: PHYSICAL THERAPIST

## 2022-06-09 PROCEDURE — 97012 MECHANICAL TRACTION THERAPY: CPT | Performed by: PHYSICAL THERAPIST

## 2022-06-09 PROCEDURE — 97140 MANUAL THERAPY 1/> REGIONS: CPT | Performed by: PHYSICAL THERAPIST

## 2022-06-09 NOTE — PROGRESS NOTES
Daily Note     Today's date: 2022  Patient name: Enrico Goins  : 1965  MRN: [id number]  Referring provider: Fara Steele PT  Dx:   Encounter Diagnosis     ICD-10-CM    1  Lumbar radiculopathy  M54 16    2  Chronic bilateral low back pain with bilateral sciatica  M54 42     M54 41     G89 29                   Subjective: Patient states he has been doing a bit better  Objective: See treatment diary below      Assessment: Tolerated treatment well  Pt tolerated ex program within lumbar stiff & discomfort to his lumbar  Radicular sx's remains the same  Mild tightness hip flexors, and weaknes  Plan: Continue per plan of care  Precautions: none             Manuals             L1-5 UPA right Gr  Iv 10sec x5            Rotational mob L5 Gr  V                                      Neuro Re-Ed                                                                                                        Ther Ex             LTR on  roller 1min x3            Knee to chest stretch 10sec x5            Piriformis stretch 10sec x5            Kneeling hip flexor stretch 10sec x5            Foam roller on wall 5 min                         Hip flexor stretch EOT w/Green strap   10sec x5                                                                                                       Modalities             Mechanical traction 165lbs  30/5  10min

## 2022-06-13 ENCOUNTER — APPOINTMENT (OUTPATIENT)
Dept: PHYSICAL THERAPY | Facility: MEDICAL CENTER | Age: 57
End: 2022-06-13
Payer: COMMERCIAL

## 2022-06-14 ENCOUNTER — TELEPHONE (OUTPATIENT)
Dept: FAMILY MEDICINE CLINIC | Facility: CLINIC | Age: 57
End: 2022-06-14

## 2022-06-14 NOTE — TELEPHONE ENCOUNTER
PT is covid positive and would like his follow up TCM scheduled   He was seen at [facility] on 06/11/2022

## 2022-06-15 ENCOUNTER — TELEMEDICINE (OUTPATIENT)
Dept: FAMILY MEDICINE CLINIC | Facility: CLINIC | Age: 57
End: 2022-06-15
Payer: COMMERCIAL

## 2022-06-15 ENCOUNTER — APPOINTMENT (OUTPATIENT)
Dept: RADIOLOGY | Facility: CLINIC | Age: 57
End: 2022-06-15
Payer: COMMERCIAL

## 2022-06-15 VITALS — BODY MASS INDEX: 30.46 KG/M2 | HEIGHT: 75 IN | WEIGHT: 245 LBS | TEMPERATURE: 98.9 F

## 2022-06-15 DIAGNOSIS — R05.9 COUGH: ICD-10-CM

## 2022-06-15 DIAGNOSIS — U07.1 COVID-19: ICD-10-CM

## 2022-06-15 DIAGNOSIS — U07.1 COVID-19: Primary | ICD-10-CM

## 2022-06-15 PROCEDURE — 1036F TOBACCO NON-USER: CPT | Performed by: FAMILY MEDICINE

## 2022-06-15 PROCEDURE — 3008F BODY MASS INDEX DOCD: CPT | Performed by: FAMILY MEDICINE

## 2022-06-15 PROCEDURE — 71046 X-RAY EXAM CHEST 2 VIEWS: CPT

## 2022-06-15 PROCEDURE — 99214 OFFICE O/P EST MOD 30 MIN: CPT | Performed by: FAMILY MEDICINE

## 2022-06-15 RX ORDER — HYDROXYZINE HYDROCHLORIDE 25 MG/1
25 TABLET, FILM COATED ORAL EVERY 6 HOURS PRN
COMMUNITY

## 2022-06-15 NOTE — ASSESSMENT & PLAN NOTE
Patient tested positive for COVID-19  Obtain STAT chest Xray to rule out pneumonia due to persistent symptoms which are not improving with medication  Patient may take over the counter decongestant and cough medication  He has completed 4 days of prednisone 60 mg and is not tolerating medication well  Patient was instructed to start taper  Take 40 mg tomorrow, then 20 mg the next day  The patient was counseled on risk factors and what signs and symptoms to monitor for:  Shortness of breath, chest pain, difficulty breathing, or fever that is not responding to antipyretic  If SpO2 <92% and does not improve with rest and breathing treatments, go to the ED immediately  If symptoms worsen the patient was advised to notify their PCP and go to the ED for immediate attention  Increase fluid intake and get plenty of rest     You may also use Acetaminophen containing product for symptom relief  Please call the office if you have any questions  The patient verbalized understanding of treatment plan

## 2022-06-15 NOTE — PROGRESS NOTES
COVID-19 Outpatient Progress Note    Assessment/Plan:    Problem List Items Addressed This Visit        Other    [id number] - Primary     Patient tested positive for COVID-19  Obtain STAT chest Xray to rule out pneumonia due to persistent symptoms which are not improving with medication  Patient may take over the counter decongestant and cough medication  He has completed 4 days of prednisone 60 mg and is not tolerating medication well  Patient was instructed to start taper  Take 40 mg tomorrow, then 20 mg the next day  The patient was counseled on risk factors and what signs and symptoms to monitor for:  Shortness of breath, chest pain, difficulty breathing, or fever that is not responding to antipyretic  If SpO2 <92% and does not improve with rest and breathing treatments, go to the ED immediately  If symptoms worsen the patient was advised to notify their PCP and go to the ED for immediate attention  Increase fluid intake and get plenty of rest     You may also use Acetaminophen containing product for symptom relief  Please call the office if you have any questions  The patient verbalized understanding of treatment plan  Relevant Orders    XR chest pa & lateral      Other Visit Diagnoses     Cough        Relevant Orders    XR chest pa & lateral         Disposition:     Discussed symptom directed medication options with patient  Discussed vitamin D, vitamin C, and/or zinc supplementation with patient  I have spent 30 minutes directly with the patient  Greater than 50% of this time was spent in counseling/coordination of care regarding: prognosis, risks and benefits of treatment options, instructions for management, patient and family education, importance of treatment compliance, risk factor reductions and impressions        Encounter provider Iwona Zhang DO    Provider located at [address] [facility] [address]  [address] [facility]  [facility] [address]  [phone]    Recent Visits  Date Type Provider Dept   06/14/22 Telephone [place] recent visits within past 7 days and meeting all other requirements  Today's Visits  Date Type Provider Dept   06/15/22 Telemedicine Delmis Clemons,  Pg [address] Misha Shepherd today's visits and meeting all other requirements  Future Appointments  No visits were found meeting these conditions  Showing future appointments within next 150 days and meeting all other requirements     This virtual check-in was done via [address] and patient was informed that this is a secure, HIPAA-compliant platform  He agrees to proceed  Patient agrees to participate in a virtual check in via telephone or video visit instead of presenting to the office to address urgent/immediate medical needs  Patient is aware this is a billable service  After connecting through [facility], the patient was identified by name and date of birth  Angie Mcneil was informed that this was a telemedicine visit and that the exam was being conducted confidentially over secure lines  My office door was closed  No one else was in the room  Angie Mcneil acknowledged consent and understanding of privacy and security of the telemedicine visit  I informed the patient that I have reviewed his record in Epic and presented the opportunity for him to ask any questions regarding the visit today  The patient agreed to participate  Verification of patient location:  Patient is located in the following state in which I hold an active license: [place]    Subjective:   Angie Mcneil is a 62 y o  male who has been screened for COVID-19  Symptom change since last report: unchanged  Patient is currently asymptomatic  Patient's symptoms include fatigue, nasal congestion, rhinorrhea, cough, shortness of breath and chest tightness   Patient denies fever, chills, malaise, sore throat, anosmia, loss of taste, abdominal pain, nausea, vomiting, diarrhea, myalgias and headaches  - Date of symptom onset: 6/10/2022  - Date of positive COVID-19 test: 6/11/2022  Type of test: PCR  COVID-19 vaccination status: Fully vaccinated with booster    Dagmar Baltazar has been staying home and has isolated themselves in his home  He is taking care to not share personal items and is cleaning all surfaces that are touched often, like counters, tabletops, and doorknobs using household cleaning sprays or wipes  He is wearing a mask when he leaves his room  Patient went to the ED on 6/11  He received 3 treatment of remdesivir infusion and still has not noticed improvement in his symptoms  He is checking his SpO2 regularly and it is usually around 95%  He states that yesterday when walking up the stairs his O2 dropped to 85% but quickly improved with rest   He is coughing constantly and has lost his voice  He is not taking any over the counter medication for his congestion or cough  He was given Prednisone 60 mg x 5 days in the ER  He does not tolerate oral steroids well and states that he feels agitated and shaky when he takes them  He has completed 4 days of the steroid so far  He is taking his nebulizer every 4-6 hours  Lab Results   Component Value Date    SARSCORONAVI Detected (A) 06/11/2022     History reviewed  No pertinent past medical history    Past Surgical History:   Procedure Laterality Date    ANKLE SURGERY      BOWEL RESECTION      KNEE SURGERY      SHOULDER SURGERY      TONSILLECTOMY      WISDOM TOOTH EXTRACTION       Current Outpatient Medications   Medication Sig Dispense Refill    Acai Berry 500 MG CAPS 2,000 mg      albuterol (2 5 mg/3 mL) 0 083 % nebulizer solution Inhale 1 each      aspirin 81 MG tablet Take 1 capsule by mouth daily      B-Complex-C CAPS take one cap by mouth once daily      Cholecalciferol (VITAMIN D3) 1000 units CAPS Take 1 tablet by mouth daily      co-enzyme Q-10 100 mg capsule Take 100 mg by mouth      hydrOXYzine HCL (ATARAX) 25 mg tablet Take 25 mg by mouth every 6 (six) hours as needed for itching      Multiple Vitamins-Minerals (MULTIVITAMIN WITH MINERALS) tablet Take 1 tablet by mouth daily      mupirocin (BACTROBAN) 2 % ointment Apply topically 3 (three) times a day 22 g 0    Omega-3 Fatty Acids (FISH OIL) 1200 MG CAPS Take 1 capsule by mouth daily      PREDNISONE PO Take by mouth Taking 20 mg TID will end on 6/16/2022      Saccharomyces boulardii (PROBIOTIC) 250 MG CAPS Take by mouth      SYMBICORT 160-4 5 MCG/ACT inhaler INL 2 PUFFS PO Q 12 H  3    Turmeric 500 MG CAPS Take 1 capsule by mouth      valACYclovir (VALTREX) 1,000 mg tablet Take 2 tablets (2,000 mg total) by mouth 2 (two) times a day for 1 day 4 tablet 0     No current facility-administered medications for this visit  Allergies   Allergen Reactions    Methylprednisolone Other (See Comments)    Bee Venom Other (See Comments)     swelling       Review of Systems   Constitutional: Positive for fatigue  Negative for chills and fever  HENT: Positive for congestion and rhinorrhea  Negative for sore throat  Respiratory: Positive for cough, chest tightness and shortness of breath  Gastrointestinal: Negative for abdominal pain, diarrhea, nausea and vomiting  Musculoskeletal: Negative for myalgias  Neurological: Negative for headaches  All other systems reviewed and are negative  Objective:    Vitals:    06/15/22 1744   Temp: 98 9 °F (37 2 °C)   TempSrc: Tympanic   Weight: 111 kg (245 lb)   Height: 6' 3" (1 905 m)       Physical Exam  Vitals and nursing note reviewed  Constitutional:       General: He is not in acute distress  Appearance: He is ill-appearing  He is not toxic-appearing  HENT:      Head: Normocephalic and atraumatic  Nose: Congestion present  Pulmonary:      Effort: Pulmonary effort is normal  No respiratory distress        Comments: +coughing   Neurological:      General: No focal deficit present  Mental Status: He is alert and oriented to person, place, and time  Psychiatric:         Mood and Affect: Mood normal          Behavior: Behavior normal          Thought Content: Thought content normal          VIRTUAL VISIT [address] verbally agrees to participate in [employer]  Pt is aware that [employer] could be limited without vital signs or the ability to perform a full hands-on physical exam  Filipe Dangelo understands he or the provider may request at any time to terminate the video visit and request the patient to seek care or treatment in person

## 2022-06-16 DIAGNOSIS — J22 LOWER RESPIRATORY INFECTION: Primary | ICD-10-CM

## 2022-06-16 RX ORDER — LEVOFLOXACIN 500 MG/1
500 TABLET, FILM COATED ORAL EVERY 24 HOURS
Qty: 5 TABLET | Refills: 0 | Status: SHIPPED | OUTPATIENT
Start: 2022-06-16 | End: 2022-06-21

## 2022-06-17 ENCOUNTER — APPOINTMENT (OUTPATIENT)
Dept: PHYSICAL THERAPY | Facility: MEDICAL CENTER | Age: 57
End: 2022-06-17
Payer: COMMERCIAL

## 2022-06-30 ENCOUNTER — OFFICE VISIT (OUTPATIENT)
Dept: PHYSICAL THERAPY | Facility: MEDICAL CENTER | Age: 57
End: 2022-06-30
Payer: COMMERCIAL

## 2022-06-30 DIAGNOSIS — M54.16 LUMBAR RADICULOPATHY: ICD-10-CM

## 2022-06-30 DIAGNOSIS — G89.29 CHRONIC BILATERAL LOW BACK PAIN WITH BILATERAL SCIATICA: Primary | ICD-10-CM

## 2022-06-30 DIAGNOSIS — M54.42 CHRONIC BILATERAL LOW BACK PAIN WITH BILATERAL SCIATICA: Primary | ICD-10-CM

## 2022-06-30 DIAGNOSIS — M54.41 CHRONIC BILATERAL LOW BACK PAIN WITH BILATERAL SCIATICA: Primary | ICD-10-CM

## 2022-06-30 PROCEDURE — 97110 THERAPEUTIC EXERCISES: CPT | Performed by: PHYSICAL THERAPIST

## 2022-06-30 PROCEDURE — 97140 MANUAL THERAPY 1/> REGIONS: CPT | Performed by: PHYSICAL THERAPIST

## 2022-07-01 NOTE — PROGRESS NOTES
Daily Note     Today's date: 2022  Patient name: Anushka English  : 1965  MRN: [id number]  Referring provider: Courtney Batista PT  Dx:   Encounter Diagnosis     ICD-10-CM    1  Chronic bilateral low back pain with bilateral sciatica  M54 42     M54 41     G89 29    2  Lumbar radiculopathy  M54 16                   Subjective: Patient states he feels as though he has made a plateau  Symptoms in the back have been better overall but he has not been nearly as active due to covid  Objective: See treatment diary below      Assessment: Tolerated treatment well  Pt tolerated ex program within lumbar stiff & discomfort to his lumbar  Radicular sx's are worse in the morning hours  Mild tightness hip flexors, and weaknes  Patient will continue on his own for 2 weeks and follow up then  Decision for the next steps will be made at that time  Plan: Continue per plan of care  Precautions: none             Manuals             L1-5 UPA right Gr  Iv 10sec x5            Rotational mob L5 Gr  V                                      Neuro Re-Ed                                                                                                        Ther Ex             LTR on  roller 1min x3            Knee to chest stretch 10sec x5            Piriformis stretch 10sec x5            Kneeling hip flexor stretch 10sec x5            Foam roller on wall 5 min                         Hip flexor stretch EOT w/Green strap   10sec x5                                                                                                       Modalities             Mechanical traction

## 2022-08-04 ENCOUNTER — OFFICE VISIT (OUTPATIENT)
Dept: FAMILY MEDICINE CLINIC | Facility: CLINIC | Age: 57
End: 2022-08-04

## 2022-08-04 VITALS
HEART RATE: 71 BPM | WEIGHT: 248 LBS | DIASTOLIC BLOOD PRESSURE: 80 MMHG | SYSTOLIC BLOOD PRESSURE: 120 MMHG | HEIGHT: 75 IN | BODY MASS INDEX: 30.84 KG/M2 | OXYGEN SATURATION: 98 %

## 2022-08-04 DIAGNOSIS — Z02.89 ENCOUNTER FOR FEDERAL AVIATION ADMINISTRATION (FAA) EXAMINATION: Primary | ICD-10-CM

## 2022-08-04 PROCEDURE — 99499 UNLISTED E&M SERVICE: CPT | Performed by: FAMILY MEDICINE

## 2022-09-19 ENCOUNTER — TELEPHONE (OUTPATIENT)
Dept: PAIN MEDICINE | Facility: CLINIC | Age: 57
End: 2022-09-19

## 2022-10-04 ENCOUNTER — APPOINTMENT (OUTPATIENT)
Dept: RADIOLOGY | Facility: CLINIC | Age: 57
End: 2022-10-04
Payer: COMMERCIAL

## 2022-10-04 ENCOUNTER — CONSULT (OUTPATIENT)
Dept: PAIN MEDICINE | Facility: CLINIC | Age: 57
End: 2022-10-04
Payer: COMMERCIAL

## 2022-10-04 VITALS
TEMPERATURE: 98.7 F | DIASTOLIC BLOOD PRESSURE: 82 MMHG | HEART RATE: 86 BPM | SYSTOLIC BLOOD PRESSURE: 120 MMHG | BODY MASS INDEX: 31.21 KG/M2 | HEIGHT: 75 IN | WEIGHT: 251 LBS

## 2022-10-04 DIAGNOSIS — M25.559 HIP PAIN: ICD-10-CM

## 2022-10-04 DIAGNOSIS — M54.16 LUMBAR RADICULOPATHY: Primary | ICD-10-CM

## 2022-10-04 DIAGNOSIS — M54.16 LUMBAR RADICULOPATHY: ICD-10-CM

## 2022-10-04 PROCEDURE — 72114 X-RAY EXAM L-S SPINE BENDING: CPT

## 2022-10-04 PROCEDURE — 73502 X-RAY EXAM HIP UNI 2-3 VIEWS: CPT

## 2022-10-04 PROCEDURE — 99204 OFFICE O/P NEW MOD 45 MIN: CPT | Performed by: ANESTHESIOLOGY

## 2022-10-04 RX ORDER — RIBOFLAVIN (VITAMIN B2) 100 MG
100 TABLET ORAL DAILY
COMMUNITY

## 2022-10-04 NOTE — PROGRESS NOTES
Assessment  1  Lumbar radiculopathy - Right    2  Hip pain - Right        Plan      At this point the patient's pain persists despite time, relative rest, activity modification, and nonsteroidal anti-inflammatories  His pain is significantly interfering with his daily living activities  He is undergone physical therapy, chiropractic treatment and massage therapy  He reports weakness and has slight neurological deficit on exam   It is appropriate to order an MRI of the lumbar spine to rule out any significant etiology of his symptoms  In addition will obtain flexion-extension lumbar spine x-rays to check for stability and hip radiographs to check for arthritis  Patient's specifically mentioned he does not want to proceed with future interventional treatments or oral medications and we will discuss at follow-up  Once we obtain MRI results, I will follow-up with the patient, review the results and current symptoms, and discuss the next steps of the treatment plan  My impressions and treatment recommendations were discussed in detail with the patient who verbalized understanding and had no further questions  Discharge instructions were provided  I personally saw and examined the patient and I agree with the above discussed plan of care  This note is created using dictation transcription  It may contain typographical errors, grammatical errors, improperly dictated words, background noise and other errors  Orders Placed This Encounter   Procedures    XR hip/pelv 2-3 vws right if performed     Standing Status:   Future     Standing Expiration Date:   10/4/2026     Scheduling Instructions:      Bring along any outside films relating to this procedure   XR spine lumbar complete w bending minimum 6 views     Standing Status:   Future     Standing Expiration Date:   10/4/2026     Scheduling Instructions:      Bring along any outside films relating to this procedure            MRI lumbar spine without contrast     Standing Status:   Future     Standing Expiration Date:   10/4/2026     Scheduling Instructions: There is no preparation for this test  Please leave your jewelry and valuables at home, wedding rings are the exception  All patients will be required to change into a hospital gown and pants  Street clothes are not permitted in the MRI  Magnetic nail polish must be removed prior to arrival for your test  Please bring your insurance cards, a form of photo ID and a list of your medications with you  Arrive 15 minutes prior to your appointment time in order to register  Please bring any prior CT or MRI studies of this area that were not performed at a [facility]  To schedule this appointment, please contact Central Scheduling at [phone]  Prior to your appointment, please make sure you complete the MRI Screening Form when you e-Check in for your appointment  This will be available starting 7 days before your appointment in [address]  You may receive an e-mail with an activation code if you do not have a [employer] account  If you do not have access to a device, we will complete your screening at your appointment  Order Specific Question:   What is the patient's sedation requirement? Answer:   No Sedation     Order Specific Question:   Release to patient through [employer]     Answer:   Immediate     Order Specific Question:   Is order priority selected as STAT?      Answer:   No     Order Specific Question:   Reason for Exam (FREE TEXT)     Answer:   right lumbar radic     New Medications Ordered This Visit   Medications    b complex-C-E-zinc (STRESS FORMULA/ZINC) tablet     Sig: Take 1 tablet by mouth daily    Ascorbic Acid (vitamin C) 100 MG tablet     Sig: Take 100 mg by mouth daily    QUERCETIN PO     Sig: Take by mouth     Referred By: Christina Yepez DO  History of Present Illness    Jaz Guzmán is a 62 y o  male with longstanding history of low back and lower extremity pain  However for the past three months he is developed severe right-sided groin and leg pain  He also reports weakness of his right lower limb and numbness and tingling  His symptoms are moderate to severe  He has undergone chiropractic treatment for years and recent physical therapy  His pain interferes with daily living activities his intermittent  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  He describes pain in his legs burning numbness and pins and needle sensation with subjective weakness of the lower limbs  He denies any loss of bowel or bladder function  I have personally reviewed and/or updated the patient's past medical history, past surgical history, family history, social history, current medications, allergies, and vital signs today  Review of Systems   Constitutional: Negative for fever and unexpected weight change  HENT: Negative for trouble swallowing  Eyes: Negative for visual disturbance  Respiratory: Negative for shortness of breath and wheezing  Cardiovascular: Negative for chest pain and palpitations  Gastrointestinal: Negative for constipation, diarrhea, nausea and vomiting  Endocrine: Negative for cold intolerance, heat intolerance and polydipsia  Genitourinary: Negative for difficulty urinating and frequency  Musculoskeletal: Negative for arthralgias, gait problem, joint swelling and myalgias  Skin: Negative for rash  Neurological: Positive for numbness  Negative for dizziness, seizures, syncope, weakness and headaches  Hematological: Does not bruise/bleed easily  Psychiatric/Behavioral: Negative for dysphoric mood  All other systems reviewed and are negative        Patient Active Problem List   Diagnosis    Exercise-induced bronchospasm    Herpes simplex type 1 infection    Acquired short Achilles tendon    Tendinitis of ankle    Chondromalacia patellae    Current tear of medial cartilage or meniscus of knee    Lesion of radial nerve    Cat scratch of left hand    Cloudy urine    Cheilitis    COVID-19       Past Medical History:   Diagnosis Date    Asthma     Diverticulitis        Past Surgical History:   Procedure Laterality Date    ANKLE SURGERY      BOWEL RESECTION      KNEE SURGERY      SHOULDER SURGERY      TONSILLECTOMY      WISDOM TOOTH EXTRACTION         Family History   Problem Relation Age of Onset    Breast cancer Mother        Social History     Occupational History    Not on file   Tobacco Use    Smoking status: Never Smoker    Smokeless tobacco: Never Used   Vaping Use    Vaping Use: Never used   Substance and Sexual Activity    Alcohol use:  Yes    Drug use: No    Sexual activity: Yes     Partners: Female       Current Outpatient Medications on File Prior to Visit   Medication Sig    Acai Berry 500 MG CAPS 2,000 mg    albuterol (2 5 mg/3 mL) 0 083 % nebulizer solution Inhale 1 each    Ascorbic Acid (vitamin C) 100 MG tablet Take 100 mg by mouth daily    aspirin 81 MG tablet Take 1 capsule by mouth daily    b complex-C-E-zinc (STRESS FORMULA/ZINC) tablet Take 1 tablet by mouth daily    B-Complex-C CAPS take one cap by mouth once daily    Cholecalciferol (VITAMIN D3) 1000 units CAPS Take 1 tablet by mouth daily    co-enzyme Q-10 100 mg capsule Take 100 mg by mouth    Multiple Vitamins-Minerals (MULTIVITAMIN WITH MINERALS) tablet Take 1 tablet by mouth daily    Omega-3 Fatty Acids (FISH OIL) 1200 MG CAPS Take 1 capsule by mouth daily    QUERCETIN PO Take by mouth    Saccharomyces boulardii (PROBIOTIC) 250 MG CAPS Take by mouth    SYMBICORT 160-4 5 MCG/ACT inhaler INL 2 PUFFS PO Q 12 H    Turmeric 500 MG CAPS Take 1 capsule by mouth    [DISCONTINUED] hydrOXYzine HCL (ATARAX) 25 mg tablet Take 25 mg by mouth every 6 (six) hours as needed for itching    [DISCONTINUED] mupirocin (BACTROBAN) 2 % ointment Apply topically 3 (three) times a day (Patient not taking: No sig reported)    [DISCONTINUED] PREDNISONE PO Take by mouth Taking 20 mg TID will end on 6/16/2022    [DISCONTINUED] valACYclovir (VALTREX) 1,000 mg tablet Take 2 tablets (2,000 mg total) by mouth 2 (two) times a day for 1 day     No current facility-administered medications on file prior to visit  Allergies   Allergen Reactions    Methylprednisolone Other (See Comments)    Bee Venom Other (See Comments)     swelling       Physical Exam    /82 (BP Location: Left arm, Patient Position: Sitting, Cuff Size: Standard)   Pulse 86   Temp 98 7 °F (37 1 °C)   Ht 6' 3" (1 905 m)   Wt 114 kg (251 lb)   BMI 31 37 kg/m²     Constitutional: normal, well developed, well nourished, alert, in no distress and non-toxic and no overt pain behavior  Eyes: anicteric  HEENT: grossly intact  Neck: supple, symmetric, trachea midline and no masses   Pulmonary:even and unlabored  Cardiovascular:No edema or pitting edema present  Skin:Normal without rashes or lesions and well hydrated  Psychiatric:Mood and affect appropriate  Neurologic:Cranial Nerves II-XII grossly intact  Musculoskeletal:normal ,   Inspection:  Normal station and gait  Normal lumbar lordotic curve with no significant scoliosis or spinal step-off  Skin intact without erythema  No gross mass or muscle atrophy  Palpation:  There is no tenderness to palpation overlying the sacroiliac joint as well as the ischial bursa bilateral   No significant tenderness over the greater trochanteric bursa bilaterally  Motor/Strength:  5/5 strength in the bilateral lower limbs  The patient is able to heel and/toe walk  Tandem gait is intact  Reflexes: Deep tendon reflex are diminished compared to the left 1+ right patellar and Achilles 2+ left patella Achilles  Sensation:   Sensation intact to light touch and pinprick in the bilateral lower limbs  Proprioception is intact at bilateral hallux  Maneuvers: Negative bilateral straight leg raising  Negative Erick's maneuver  Hip range of motion on the right reproduce groin anterior thigh pain  Imaging  MRI Lumbar Spine @ [facility] 8-9-12  Impression: No interval change since 2008  Degenerative changes of   the L5-S1 disc and the L4-L5 and L5-S1 facets  Posterior annular   tears at L3-L4 and L4-L5  Mild narrowing of the L5-S1 neural   foramina  No herniated disc or central canal stenosis  I have personally reviewed pertinent films in PACS and my interpretation is Lumbar spondylosis with disc protrusion

## 2022-10-11 ENCOUNTER — TELEPHONE (OUTPATIENT)
Dept: PAIN MEDICINE | Facility: CLINIC | Age: 57
End: 2022-10-11

## 2022-10-11 NOTE — TELEPHONE ENCOUNTER
S/w pt, stated that he would like the name of an orthopedic doctor in [place] that MATTHEW would recommend for hips  Advised pt, the writer will d/w MATTHEW and cb to advise  Pt also questioned SL's comments on his lumbar spine x-ray  Advised pt, no comments available  Will fiorella and trish to advise  Pt verbalized understanding and appreciation

## 2022-10-11 NOTE — TELEPHONE ENCOUNTER
Attempted to contact pt, left a detailed message on machine per medical communication consent on file advising of above  Provided cb number and office hours for questions or additional info

## 2022-10-11 NOTE — TELEPHONE ENCOUNTER
----- Message from [employer], DO sent at 10/10/2022  4:56 PM EDT -----  Moderate OA hip, consider Ortho consult

## 2022-10-11 NOTE — TELEPHONE ENCOUNTER
S/w pt, advised of above  Contact info provided  Pt verbalized understandng and stated that he will proceed with the mri as scheduled and await fu / SL before contacting Dr Elena Rodriguez

## 2022-10-23 ENCOUNTER — HOSPITAL ENCOUNTER (OUTPATIENT)
Dept: MRI IMAGING | Facility: HOSPITAL | Age: 57
Discharge: HOME | End: 2022-10-23
Attending: ANESTHESIOLOGY
Payer: COMMERCIAL

## 2022-10-23 DIAGNOSIS — M54.16 LUMBAR RADICULOPATHY: ICD-10-CM

## 2022-10-23 PROCEDURE — 72148 MRI LUMBAR SPINE W/O DYE: CPT

## 2022-10-23 PROCEDURE — G1004 CDSM NDSC: HCPCS

## 2022-10-27 ENCOUNTER — TELEPHONE (OUTPATIENT)
Dept: PAIN MEDICINE | Facility: CLINIC | Age: 57
End: 2022-10-27

## 2022-10-27 NOTE — TELEPHONE ENCOUNTER
----- Message from Chace Lemon DO sent at 10/27/2022  2:22 PM EDT -----  Small right neural foraminal disc trusion L4/5  Minimal right neural foraminal narrowing  Central canal and left neural foramen patent      There is a left neural foraminal disc protrusion L5/S1  Mild left neural foraminal narrowing  Central canal right neural foramen patent  This level is similar to the prior study

## 2022-10-31 ENCOUNTER — APPOINTMENT (OUTPATIENT)
Dept: RADIOLOGY | Facility: CLINIC | Age: 57
End: 2022-10-31

## 2022-10-31 ENCOUNTER — OFFICE VISIT (OUTPATIENT)
Dept: PAIN MEDICINE | Facility: CLINIC | Age: 57
End: 2022-10-31

## 2022-10-31 ENCOUNTER — TELEPHONE (OUTPATIENT)
Dept: FAMILY MEDICINE CLINIC | Facility: CLINIC | Age: 57
End: 2022-10-31

## 2022-10-31 VITALS
SYSTOLIC BLOOD PRESSURE: 120 MMHG | WEIGHT: 246 LBS | DIASTOLIC BLOOD PRESSURE: 80 MMHG | BODY MASS INDEX: 30.59 KG/M2 | TEMPERATURE: 98.2 F | HEIGHT: 75 IN

## 2022-10-31 DIAGNOSIS — M54.50 CHRONIC BILATERAL LOW BACK PAIN, UNSPECIFIED WHETHER SCIATICA PRESENT: ICD-10-CM

## 2022-10-31 DIAGNOSIS — Z13.0 SCREENING FOR DEFICIENCY ANEMIA: ICD-10-CM

## 2022-10-31 DIAGNOSIS — M25.552 LEFT HIP PAIN: ICD-10-CM

## 2022-10-31 DIAGNOSIS — G89.4 CHRONIC PAIN SYNDROME: Primary | ICD-10-CM

## 2022-10-31 DIAGNOSIS — E78.2 MIXED HYPERLIPIDEMIA: ICD-10-CM

## 2022-10-31 DIAGNOSIS — G89.29 CHRONIC BILATERAL LOW BACK PAIN, UNSPECIFIED WHETHER SCIATICA PRESENT: ICD-10-CM

## 2022-10-31 DIAGNOSIS — Z12.5 SCREENING FOR PROSTATE CANCER: Primary | ICD-10-CM

## 2022-10-31 DIAGNOSIS — M51.16 INTERVERTEBRAL DISC DISORDER WITH RADICULOPATHY OF LUMBAR REGION: ICD-10-CM

## 2022-10-31 NOTE — TELEPHONE ENCOUNTER
Lab orders placed  Please let patient know that he needs to wait until 11/17 to get them done in order for insurance to cover his PSA

## 2022-10-31 NOTE — PROGRESS NOTES
Assessment:  1  Chronic pain syndrome    2  Chronic bilateral low back pain, unspecified whether sciatica present    3  Left hip pain    4  Intervertebral disc disorder with radiculopathy of lumbar region        Plan:    The patient is a 62 y o  male last seen on 10/04/2022who presents for a follow up office visit in regards to chronic pain secondary to low back pain lumbar intervertebral disc disorder with radiculopathy and lumbar foraminal stenosis  Patient presents today with low back and bilateral leg pain  MRI of the lumbar spine was reviewed the patient in depth using the images  I explained he has 2 small disc protrusions right-sided at L4-L5 and left-sided at L5-S1, both causing mild foraminal stenosis  To help decrease inflammation and nerve irritation a lumbar translaminar epidural steroid injection at L5-S1 can be performed  The patient however is hesitant as he has a fear of needles  I did explain we can prescribe lorazepam 0 5 mg for him to take 1 hour prior to the procedure to help with the anxiety  The patient would like to think more about the procedure, and will contact the office if he decides to move forward with it  He was given a brochure for additional information  In regards to the left hip his pain appears to be occurring from bursitis  I will order an x-ray of the left hip since he did sustain a fall  I will contact him with the results  I did advise him to use lidocaine patch, or Voltaren gel to the left hip to help with the pain especially at nighttime     The patient was advised to contact the office should their symptoms worsen in the interim  The patient was agreeable and verbalized an understanding  History of Present Illness: The patient is a 62 y o  male last seen on 10/04/2022who presents for a follow up office visit in regards to chronic pain secondary to low back pain lumbar intervertebral disc disorder with radiculopathy and lumbar foraminal stenosis  His last office visit was October 4, 2022 which was his initial consultation  He was ordered an MRI of the lumbar spine  Patient presents today with ongoing low back and bilateral leg pain  He will also feel pain in the groin bilaterally  He also feels weakness in his legs  The pain is intermittent, but tends to be worse with certain activities  He describes the pain as burning, dull aching, sharp, shooting, numbness, pins and needles, and heaviness  He does exercise regularly at the gym with a  which she does find to be helpful  He states that he has been also experiencing left hip pain which wakes him at night  He had a fall and landed on the left hip when playing baseball and believes the hip pain worsened after that  He is rating his pain a 4/10 on the numeric rating scale  Patient is a  and is not able to take certain medications    Xray right hip showed moderate Osteoarthritis      I have personally reviewed and/or updated the patient's past medical history, past surgical history, family history, social history, current medications, allergies, and vital signs today  Review of Systems:    Review of Systems   Musculoskeletal: Positive for gait problem and joint swelling (joint stiffness)  Neurological: Positive for weakness  Past Medical History:   Diagnosis Date   • Asthma    • Diverticulitis        Past Surgical History:   Procedure Laterality Date   • ANKLE SURGERY     • BOWEL RESECTION     • KNEE SURGERY     • SHOULDER SURGERY     • TONSILLECTOMY     • WISDOM TOOTH EXTRACTION         Family History   Problem Relation Age of Onset   • Breast cancer Mother        Social History     Occupational History   • Not on file   Tobacco Use   • Smoking status: Never Smoker   • Smokeless tobacco: Never Used   Vaping Use   • Vaping Use: Never used   Substance and Sexual Activity   • Alcohol use:  Yes   • Drug use: No   • Sexual activity: Yes     Partners: Female         Current Outpatient Medications:   •  Acai Berry 500 MG CAPS, 2,000 mg, Disp: , Rfl:   •  albuterol (2 5 mg/3 mL) 0 083 % nebulizer solution, Inhale 1 each, Disp: , Rfl:   •  Ascorbic Acid (vitamin C) 100 MG tablet, Take 100 mg by mouth daily, Disp: , Rfl:   •  aspirin 81 MG tablet, Take 1 capsule by mouth daily, Disp: , Rfl:   •  b complex-C-E-zinc (STRESS FORMULA/ZINC) tablet, Take 1 tablet by mouth daily, Disp: , Rfl:   •  B-Complex-C CAPS, take one cap by mouth once daily, Disp: , Rfl:   •  Cholecalciferol (VITAMIN D3) 1000 units CAPS, Take 1 tablet by mouth daily, Disp: , Rfl:   •  co-enzyme Q-10 100 mg capsule, Take 100 mg by mouth, Disp: , Rfl:   •  Multiple Vitamins-Minerals (MULTIVITAMIN WITH MINERALS) tablet, Take 1 tablet by mouth daily, Disp: , Rfl:   •  Omega-3 Fatty Acids (FISH OIL) 1200 MG CAPS, Take 1 capsule by mouth daily, Disp: , Rfl:   •  QUERCETIN PO, Take by mouth, Disp: , Rfl:   •  Saccharomyces boulardii (PROBIOTIC) 250 MG CAPS, Take by mouth, Disp: , Rfl:   •  SYMBICORT 160-4 5 MCG/ACT inhaler, INL 2 PUFFS PO Q 12 H, Disp: , Rfl: 3  •  Turmeric 500 MG CAPS, Take 1 capsule by mouth, Disp: , Rfl:     Allergies   Allergen Reactions   • Methylprednisolone Other (See Comments)   • Bee Venom Other (See Comments)     swelling       Physical Exam:    /80 (BP Location: Left arm, Patient Position: Sitting, Cuff Size: Adult)   Temp 98 2 °F (36 8 °C)   Ht 6' 3" (1 905 m) Comment: verbal  Wt 112 kg (246 lb)   BMI 30 75 kg/m²     Constitutional:normal, well developed, well nourished, alert, in no distress and non-toxic and no overt pain behavior    Eyes:anicteric  HEENT:grossly intact  Neck:supple, symmetric, trachea midline and no masses   Pulmonary:even and unlabored  Cardiovascular:No edema or pitting edema present  Skin:Normal without rashes or lesions and well hydrated  Psychiatric:Mood and affect appropriate  Neurologic:Cranial Nerves II-XII grossly intact  Musculoskeletal:normal    Lumbar Spine Exam    Appearance:  Normal lordosis  Palpation/Tenderness:  no tenderness or spasm   Tenderness left trochanteric bursa  Range of Motion:  Full range of motion with no pain or limitations in flexion, extension, lateral flexion and rotation  Motor Strength:  Left hip flexion:  4/5  Right hip flexion:  5/5  Left knee flexion:  5/5  Left knee extension:  5/5  Right knee flexion:  5/5  Right knee extension:  5/5  Left foot dorsiflexion:  5/5  Left foot plantar flexion:  5/5  Right foot dorsiflexion:  5/5  Right foot plantar flexion:  5/5    Imaging    MRI LUMBAR SPINE WITHOUT CONTRAST     INDICATION: M54 16: Radiculopathy, lumbar region  Low back pain  Bilateral leg pain  Feet numbness  Pain for months      COMPARISON:  8/8/2012     TECHNIQUE:  Sagittal T1, sagittal T2, sagittal inversion recovery, axial T1 and axial T2, coronal T2     IMAGE QUALITY:  Diagnostic     FINDINGS:     VERTEBRAL BODIES:  There are 5 lumbar type vertebral bodies  Normal lordosis  Mild dextroscoliosis thoracolumbar junction  Minimal levoscoliosis lumbosacral junction  Scattered degenerative endplate changes  No focally suspicious marrow lesions  No   bone marrow edema or compression abnormality  Type II Modic endplate changes [id number] redemonstrated      SACRUM:       Scattered degenerative endplate changes  No focally suspicious marrow lesions  No bone marrow edema or compression abnormality      DISTAL CORD AND CONUS:  Normal size and signal within the distal cord and conus    The conus medullaris terminates at the L1 level      PARASPINAL SOFT TISSUES:  In the anterior aspect of the right kidney there is a rounded 0 7 cm T2 hyperintense lesion, too small to characterize     LOWER THORACIC DISC SPACES:  Normal disc height and signal   No disc herniation, canal stenosis or foraminal narrowing      LUMBAR DISC SPACES:     L1-L2:  Mild noncompressive degenerative disease      L2-L3:  Normal      L3-L4:  Normal      L4-L5:  Small right neural foraminal disc trusion  Minimal right neural foraminal narrowing  Central canal and left neural foramen patent      L5-S1:  There is loss of disc height and signal   There is a left neural foraminal disc protrusion  Mild left neural foraminal narrowing  Central canal right neural foramen patent   This level is similar to the prior study      IMPRESSION:     Mild noncompressive lumbar degenerative change      XR hip/pelv 2-3 vws left if performed    (Results Pending)         Orders Placed This Encounter   Procedures   • XR hip/pelv 2-3 vws left if performed

## 2022-11-01 DIAGNOSIS — Z13.1 SCREENING FOR DIABETES MELLITUS: Primary | ICD-10-CM

## 2022-11-01 NOTE — TELEPHONE ENCOUNTER
Pt notified that lab orders were placed and that he needs to wait until 11/17 to get them done in order for insurance to cover his PSA

## 2022-11-08 ENCOUNTER — TELEPHONE (OUTPATIENT)
Dept: PAIN MEDICINE | Facility: CLINIC | Age: 57
End: 2022-11-08

## 2022-11-08 NOTE — TELEPHONE ENCOUNTER
----- Message from Sarwat Jiang, [address] sent at 11/7/2022  7:26 AM EST -----  Please let patient know xray left hip showed mild arthritis   Moderate on the right side

## 2022-11-10 DIAGNOSIS — R93.429 ABNORMAL MRI, KIDNEY: Primary | ICD-10-CM

## 2022-11-15 ENCOUNTER — APPOINTMENT (OUTPATIENT)
Dept: LAB | Facility: MEDICAL CENTER | Age: 57
End: 2022-11-15

## 2022-11-15 ENCOUNTER — HOSPITAL ENCOUNTER (OUTPATIENT)
Dept: ULTRASOUND IMAGING | Facility: HOSPITAL | Age: 57
Discharge: HOME | End: 2022-11-15

## 2022-11-15 DIAGNOSIS — Z13.0 SCREENING FOR DEFICIENCY ANEMIA: ICD-10-CM

## 2022-11-15 DIAGNOSIS — E78.2 MIXED HYPERLIPIDEMIA: ICD-10-CM

## 2022-11-15 DIAGNOSIS — Z13.1 SCREENING FOR DIABETES MELLITUS: ICD-10-CM

## 2022-11-15 DIAGNOSIS — R93.429 ABNORMAL MRI, KIDNEY: ICD-10-CM

## 2022-11-15 DIAGNOSIS — Z12.5 SCREENING FOR PROSTATE CANCER: ICD-10-CM

## 2022-11-15 LAB
ALBUMIN SERPL BCP-MCNC: 4.3 G/DL (ref 3.5–5)
ALP SERPL-CCNC: 86 U/L (ref 46–116)
ALT SERPL W P-5'-P-CCNC: 54 U/L (ref 12–78)
ANION GAP SERPL CALCULATED.3IONS-SCNC: 4 MMOL/L (ref 4–13)
AST SERPL W P-5'-P-CCNC: 23 U/L (ref 5–45)
BASOPHILS # BLD AUTO: 0.1 THOUSANDS/ÂΜL (ref 0–0.1)
BASOPHILS NFR BLD AUTO: 2 % (ref 0–1)
BILIRUB SERPL-MCNC: 0.9 MG/DL (ref 0.2–1)
BUN SERPL-MCNC: 17 MG/DL (ref 5–25)
CALCIUM SERPL-MCNC: 9.3 MG/DL (ref 8.3–10.1)
CHLORIDE SERPL-SCNC: 106 MMOL/L (ref 96–108)
CHOLEST SERPL-MCNC: 186 MG/DL
CO2 SERPL-SCNC: 27 MMOL/L (ref 21–32)
CREAT SERPL-MCNC: 1 MG/DL (ref 0.6–1.3)
EOSINOPHIL # BLD AUTO: 0.3 THOUSAND/ÂΜL (ref 0–0.61)
EOSINOPHIL NFR BLD AUTO: 5 % (ref 0–6)
ERYTHROCYTE [DISTWIDTH] IN BLOOD BY AUTOMATED COUNT: 11.7 % (ref 11.6–15.1)
EST. AVERAGE GLUCOSE BLD GHB EST-MCNC: 100 MG/DL
GFR SERPL CREATININE-BSD FRML MDRD: 83 ML/MIN/1.73SQ M
GLUCOSE P FAST SERPL-MCNC: 92 MG/DL (ref 65–99)
HBA1C MFR BLD: 5.1 %
HCT VFR BLD AUTO: 50.7 % (ref 36.5–49.3)
HDLC SERPL-MCNC: 42 MG/DL
HGB BLD-MCNC: 17.4 G/DL (ref 12–17)
IMM GRANULOCYTES # BLD AUTO: 0.02 THOUSAND/UL (ref 0–0.2)
IMM GRANULOCYTES NFR BLD AUTO: 0 % (ref 0–2)
LDLC SERPL CALC-MCNC: 111 MG/DL (ref 0–100)
LYMPHOCYTES # BLD AUTO: 2.35 THOUSANDS/ÂΜL (ref 0.6–4.47)
LYMPHOCYTES NFR BLD AUTO: 41 % (ref 14–44)
MCH RBC QN AUTO: 31.8 PG (ref 26.8–34.3)
MCHC RBC AUTO-ENTMCNC: 34.3 G/DL (ref 31.4–37.4)
MCV RBC AUTO: 93 FL (ref 82–98)
MONOCYTES # BLD AUTO: 0.77 THOUSAND/ÂΜL (ref 0.17–1.22)
MONOCYTES NFR BLD AUTO: 13 % (ref 4–12)
NEUTROPHILS # BLD AUTO: 2.21 THOUSANDS/ÂΜL (ref 1.85–7.62)
NEUTS SEG NFR BLD AUTO: 39 % (ref 43–75)
NRBC BLD AUTO-RTO: 0 /100 WBCS
PLATELET # BLD AUTO: 227 THOUSANDS/UL (ref 149–390)
PMV BLD AUTO: 10 FL (ref 8.9–12.7)
POTASSIUM SERPL-SCNC: 4.2 MMOL/L (ref 3.5–5.3)
PROT SERPL-MCNC: 7.5 G/DL (ref 6.4–8.4)
PSA SERPL-MCNC: 0.4 NG/ML (ref 0–4)
RBC # BLD AUTO: 5.47 MILLION/UL (ref 3.88–5.62)
SODIUM SERPL-SCNC: 137 MMOL/L (ref 135–147)
TRIGL SERPL-MCNC: 166 MG/DL
TSH SERPL DL<=0.05 MIU/L-ACNC: 3.71 UIU/ML (ref 0.45–4.5)
WBC # BLD AUTO: 5.75 THOUSAND/UL (ref 4.31–10.16)

## 2022-11-25 ENCOUNTER — OFFICE VISIT (OUTPATIENT)
Dept: FAMILY MEDICINE CLINIC | Facility: CLINIC | Age: 57
End: 2022-11-25

## 2022-11-25 VITALS
BODY MASS INDEX: 30.59 KG/M2 | HEIGHT: 75 IN | DIASTOLIC BLOOD PRESSURE: 82 MMHG | SYSTOLIC BLOOD PRESSURE: 122 MMHG | HEART RATE: 85 BPM | OXYGEN SATURATION: 96 % | RESPIRATION RATE: 16 BRPM | TEMPERATURE: 97.7 F | WEIGHT: 246 LBS

## 2022-11-25 DIAGNOSIS — Z00.00 ANNUAL PHYSICAL EXAM: Primary | ICD-10-CM

## 2022-11-25 DIAGNOSIS — J45.990 EXERCISE-INDUCED BRONCHOSPASM: ICD-10-CM

## 2022-11-25 NOTE — PROGRESS NOTES
[facility]    NAME: Bandar Parisi  AGE: 62 y o  SEX: male  : 1965     DATE: 2022     Assessment and Plan:     Patient was seen for annual physical exam   His examination today was fairly unremarkable  He is dealing with aftermath of significant COVID infection in July  He is going to the gym and working with a  though his exercise tolerance is decreased from pre infection  His blood work was reviewed today and is unremarkable  His asthma is stable  He follows with Pulmonary Medicine for that  He is up-to-date on immunizations including current COVID booster  Colon screening prostate screening all up-to-date  Problem List Items Addressed This Visit     Exercise-induced bronchospasm    Annual physical exam - Primary       Immunizations and preventive care screenings were discussed with patient today  Appropriate education was printed on patient's after visit summary  Counseling:  Alcohol/drug use: discussed moderation in alcohol intake, the recommendations for healthy alcohol use, and avoidance of illicit drug use  Dental Health: discussed importance of regular tooth brushing, flossing, and dental visits  Injury prevention: discussed safety/seat belts, safety helmets, smoke detectors, carbon dioxide detectors, and smoking near bedding or upholstery  Exercise: the importance of regular exercise/physical activity was discussed  Recommend exercise 3-5 times per week for at least 30 minutes  Return for Annual physical      Chief Complaint:     Chief Complaint   Patient presents with   • Physical Exam      History of Present Illness:     Adult Annual Physical   Patient here for a comprehensive physical exam  The patient reports Chronic fatigue and reduced exercise tolerance since COVID infection in July       Diet and Physical Activity  Diet/Nutrition: well balanced diet and consuming 3-5 servings of fruits/vegetables daily  Exercise: moderate cardiovascular exercise, strength training exercises and 3-4 times a week on average  Depression Screening  PHQ-2/9 Depression Screening    Little interest or pleasure in doing things: 0 - not at all  Feeling down, depressed, or hopeless: 0 - not at all  PHQ-2 Score: 0  PHQ-2 Interpretation: Negative depression screen       General Health  Sleep: gets 7-8 hours of sleep on average  Hearing: normal - bilateral   Vision: goes for regular eye exams, most recent eye exam <1 year ago and wears contacts  Dental: regular dental visits   Health  Symptoms include: none     Review of Systems:     Review of Systems   Constitutional: Positive for fatigue  Respiratory: Negative  Cardiovascular: Negative  Gastrointestinal: Negative  Genitourinary: Negative  Musculoskeletal: Negative  Psychiatric/Behavioral: Negative  Past Medical History:     Past Medical History:   Diagnosis Date   • Asthma    • Diverticulitis       Past Surgical History:     Past Surgical History:   Procedure Laterality Date   • ANKLE SURGERY     • BOWEL RESECTION     • KNEE SURGERY     • SHOULDER SURGERY     • TONSILLECTOMY     • WISDOM TOOTH EXTRACTION        Family History:     Family History   Problem Relation Age of Onset   • Breast cancer Mother       Social History:     Social History     Socioeconomic History   • Marital status: /Civil Union     Spouse name: None   • Number of children: None   • Years of education: None   • Highest education level: None   Occupational History   • None   Tobacco Use   • Smoking status: Never   • Smokeless tobacco: Never   Vaping Use   • Vaping Use: Never used   Substance and Sexual Activity   • Alcohol use:  Yes   • Drug use: No   • Sexual activity: Yes     Partners: Female   Other Topics Concern   • None   Social History Narrative   • None     Social Determinants of Health     Financial Resource Strain: Not on file Food Insecurity: Not on file   Transportation Needs: Not on file   Physical Activity: Not on file   Stress: Not on file   Social Connections: Not on file   Intimate Partner Violence: Not on file   Housing Stability: Not on file      Current Medications:     Current Outpatient Medications   Medication Sig Dispense Refill   • Acai Wilson 500 MG CAPS 2,000 mg     • albuterol (2 5 mg/3 mL) 0 083 % nebulizer solution Inhale 1 each     • Ascorbic Acid (vitamin C) 100 MG tablet Take 100 mg by mouth daily     • aspirin 81 MG tablet Take 1 capsule by mouth daily     • b complex-C-E-zinc (STRESS FORMULA/ZINC) tablet Take 1 tablet by mouth daily     • B-Complex-C CAPS take one cap by mouth once daily     • Cholecalciferol (VITAMIN D3) 1000 units CAPS Take 1 tablet by mouth daily     • co-enzyme Q-10 100 mg capsule Take 100 mg by mouth     • Multiple Vitamins-Minerals (MULTIVITAMIN WITH MINERALS) tablet Take 1 tablet by mouth daily     • Omega-3 Fatty Acids (FISH OIL) 1200 MG CAPS Take 1 capsule by mouth daily     • QUERCETIN PO Take by mouth     • Saccharomyces boulardii (PROBIOTIC) 250 MG CAPS Take by mouth     • SYMBICORT 160-4 5 MCG/ACT inhaler INL 2 PUFFS PO Q 12 H  3   • Turmeric 500 MG CAPS Take 1 capsule by mouth       No current facility-administered medications for this visit  Allergies: Allergies   Allergen Reactions   • Methylprednisolone Other (See Comments)   • Bee Venom Other (See Comments)     swelling      Physical Exam:     /82 (BP Location: Left arm, Patient Position: Sitting, Cuff Size: Large)   Pulse 85   Temp 97 7 °F (36 5 °C) (Temporal)   Resp 16   Ht 6' 2 8" (1 9 m)   Wt 112 kg (246 lb)   SpO2 96%   BMI 30 91 kg/m²     Physical Exam  Vitals and nursing note reviewed  Constitutional:       Appearance: He is well-developed and well-nourished  HENT:      Head: Normocephalic        Right Ear: External ear normal       Left Ear: External ear normal       Nose: Nose normal  Mouth/Throat:      Mouth: Oropharynx is clear and moist    Eyes:      Extraocular Movements: EOM normal       Conjunctiva/sclera: Conjunctivae normal       Pupils: Pupils are equal, round, and reactive to light  Cardiovascular:      Rate and Rhythm: Normal rate and regular rhythm  Pulses: Intact distal pulses  Heart sounds: Normal heart sounds  Pulmonary:      Effort: Pulmonary effort is normal       Breath sounds: Normal breath sounds  Abdominal:      General: Bowel sounds are normal       Palpations: Abdomen is soft  Musculoskeletal:         General: Normal range of motion  Cervical back: Normal range of motion and neck supple  Skin:     General: Skin is warm and dry  Psychiatric:         Mood and Affect: Mood and affect normal          Behavior: Behavior normal          Thought Content:  Thought content normal          Judgment: Judgment normal           Basim Tong, DO  [facility]

## 2023-02-09 ENCOUNTER — OFFICE VISIT (OUTPATIENT)
Dept: FAMILY MEDICINE CLINIC | Facility: CLINIC | Age: 58
End: 2023-02-09

## 2023-02-09 VITALS
DIASTOLIC BLOOD PRESSURE: 84 MMHG | HEART RATE: 85 BPM | WEIGHT: 247 LBS | SYSTOLIC BLOOD PRESSURE: 110 MMHG | BODY MASS INDEX: 30.71 KG/M2 | HEIGHT: 75 IN

## 2023-02-09 DIAGNOSIS — Z02.89 ENCOUNTER FOR FEDERAL AVIATION ADMINISTRATION (FAA) EXAMINATION: Primary | ICD-10-CM

## 2023-02-09 NOTE — PROGRESS NOTES
Chief Complaint   Patient presents with   • Physical Exam     FAA 1st class, correctives, ekg, no meds

## 2023-07-28 RX ORDER — DEXTROMETHORPHAN HYDROBROMIDE AND PROMETHAZINE HYDROCHLORIDE 15; 6.25 MG/5ML; MG/5ML
5 SYRUP ORAL 4 TIMES DAILY PRN
COMMUNITY
Start: 2022-12-30

## 2023-08-02 ENCOUNTER — OFFICE VISIT (OUTPATIENT)
Dept: FAMILY MEDICINE CLINIC | Facility: CLINIC | Age: 58
End: 2023-08-02

## 2023-08-02 VITALS
BODY MASS INDEX: 30.21 KG/M2 | DIASTOLIC BLOOD PRESSURE: 84 MMHG | SYSTOLIC BLOOD PRESSURE: 122 MMHG | HEART RATE: 84 BPM | HEIGHT: 75 IN | WEIGHT: 243 LBS

## 2023-08-02 DIAGNOSIS — Z02.89 ENCOUNTER FOR FEDERAL AVIATION ADMINISTRATION (FAA) EXAMINATION: Primary | ICD-10-CM

## 2023-08-02 PROCEDURE — 99499 UNLISTED E&M SERVICE: CPT | Performed by: FAMILY MEDICINE

## 2023-10-23 DIAGNOSIS — Z12.5 SCREENING FOR PROSTATE CANCER: ICD-10-CM

## 2023-10-23 DIAGNOSIS — Z13.0 SCREENING FOR DEFICIENCY ANEMIA: ICD-10-CM

## 2023-10-23 DIAGNOSIS — E78.2 MIXED HYPERLIPIDEMIA: ICD-10-CM

## 2023-10-23 DIAGNOSIS — Z13.1 SCREENING FOR DIABETES MELLITUS: Primary | ICD-10-CM

## 2023-11-08 ENCOUNTER — APPOINTMENT (OUTPATIENT)
Dept: LAB | Facility: MEDICAL CENTER | Age: 58
End: 2023-11-08
Payer: COMMERCIAL

## 2023-11-08 DIAGNOSIS — Z13.0 SCREENING FOR DEFICIENCY ANEMIA: ICD-10-CM

## 2023-11-08 DIAGNOSIS — Z12.5 SCREENING FOR PROSTATE CANCER: ICD-10-CM

## 2023-11-08 DIAGNOSIS — Z13.1 SCREENING FOR DIABETES MELLITUS: ICD-10-CM

## 2023-11-08 DIAGNOSIS — E78.2 MIXED HYPERLIPIDEMIA: ICD-10-CM

## 2023-11-08 LAB
ALBUMIN SERPL BCP-MCNC: 4.7 G/DL (ref 3.5–5)
ALP SERPL-CCNC: 73 U/L (ref 34–104)
ALT SERPL W P-5'-P-CCNC: 42 U/L (ref 7–52)
ANION GAP SERPL CALCULATED.3IONS-SCNC: 9 MMOL/L
AST SERPL W P-5'-P-CCNC: 30 U/L (ref 13–39)
BASOPHILS # BLD AUTO: 0.09 THOUSANDS/ÂΜL (ref 0–0.1)
BASOPHILS NFR BLD AUTO: 2 % (ref 0–1)
BILIRUB SERPL-MCNC: 1.04 MG/DL (ref 0.2–1)
BUN SERPL-MCNC: 20 MG/DL (ref 5–25)
CALCIUM SERPL-MCNC: 9.4 MG/DL (ref 8.4–10.2)
CHLORIDE SERPL-SCNC: 104 MMOL/L (ref 96–108)
CHOLEST SERPL-MCNC: 200 MG/DL
CO2 SERPL-SCNC: 26 MMOL/L (ref 21–32)
CREAT SERPL-MCNC: 1.02 MG/DL (ref 0.6–1.3)
EOSINOPHIL # BLD AUTO: 0.24 THOUSAND/ÂΜL (ref 0–0.61)
EOSINOPHIL NFR BLD AUTO: 4 % (ref 0–6)
ERYTHROCYTE [DISTWIDTH] IN BLOOD BY AUTOMATED COUNT: 11.9 % (ref 11.6–15.1)
EST. AVERAGE GLUCOSE BLD GHB EST-MCNC: 108 MG/DL
GFR SERPL CREATININE-BSD FRML MDRD: 80 ML/MIN/1.73SQ M
GLUCOSE P FAST SERPL-MCNC: 86 MG/DL (ref 65–99)
HBA1C MFR BLD: 5.4 %
HCT VFR BLD AUTO: 51.4 % (ref 36.5–49.3)
HDLC SERPL-MCNC: 48 MG/DL
HGB BLD-MCNC: 17.9 G/DL (ref 12–17)
IMM GRANULOCYTES # BLD AUTO: 0.02 THOUSAND/UL (ref 0–0.2)
IMM GRANULOCYTES NFR BLD AUTO: 0 % (ref 0–2)
LDLC SERPL CALC-MCNC: 125 MG/DL (ref 0–100)
LYMPHOCYTES # BLD AUTO: 2.37 THOUSANDS/ÂΜL (ref 0.6–4.47)
LYMPHOCYTES NFR BLD AUTO: 43 % (ref 14–44)
MCH RBC QN AUTO: 32.3 PG (ref 26.8–34.3)
MCHC RBC AUTO-ENTMCNC: 34.8 G/DL (ref 31.4–37.4)
MCV RBC AUTO: 93 FL (ref 82–98)
MONOCYTES # BLD AUTO: 0.76 THOUSAND/ÂΜL (ref 0.17–1.22)
MONOCYTES NFR BLD AUTO: 14 % (ref 4–12)
NEUTROPHILS # BLD AUTO: 2.02 THOUSANDS/ÂΜL (ref 1.85–7.62)
NEUTS SEG NFR BLD AUTO: 37 % (ref 43–75)
NRBC BLD AUTO-RTO: 0 /100 WBCS
PLATELET # BLD AUTO: 215 THOUSANDS/UL (ref 149–390)
PMV BLD AUTO: 10.4 FL (ref 8.9–12.7)
POTASSIUM SERPL-SCNC: 4.6 MMOL/L (ref 3.5–5.3)
PROT SERPL-MCNC: 7.3 G/DL (ref 6.4–8.4)
PSA SERPL-MCNC: 0.36 NG/ML (ref 0–4)
RBC # BLD AUTO: 5.54 MILLION/UL (ref 3.88–5.62)
SODIUM SERPL-SCNC: 139 MMOL/L (ref 135–147)
TRIGL SERPL-MCNC: 134 MG/DL
TSH SERPL DL<=0.05 MIU/L-ACNC: 3.97 UIU/ML (ref 0.45–4.5)
WBC # BLD AUTO: 5.5 THOUSAND/UL (ref 4.31–10.16)

## 2023-11-08 PROCEDURE — G0103 PSA SCREENING: HCPCS

## 2023-11-08 PROCEDURE — 80061 LIPID PANEL: CPT

## 2023-11-08 PROCEDURE — 36415 COLL VENOUS BLD VENIPUNCTURE: CPT

## 2023-11-08 PROCEDURE — 84443 ASSAY THYROID STIM HORMONE: CPT

## 2023-11-08 PROCEDURE — 85025 COMPLETE CBC W/AUTO DIFF WBC: CPT

## 2023-11-08 PROCEDURE — 83036 HEMOGLOBIN GLYCOSYLATED A1C: CPT

## 2023-11-08 PROCEDURE — 80053 COMPREHEN METABOLIC PANEL: CPT

## 2023-11-21 ENCOUNTER — OFFICE VISIT (OUTPATIENT)
Dept: FAMILY MEDICINE CLINIC | Facility: CLINIC | Age: 58
End: 2023-11-21
Payer: COMMERCIAL

## 2023-11-21 VITALS
TEMPERATURE: 97.8 F | HEART RATE: 100 BPM | SYSTOLIC BLOOD PRESSURE: 124 MMHG | WEIGHT: 241 LBS | DIASTOLIC BLOOD PRESSURE: 88 MMHG | HEIGHT: 75 IN | RESPIRATION RATE: 20 BRPM | OXYGEN SATURATION: 99 % | BODY MASS INDEX: 29.97 KG/M2

## 2023-11-21 DIAGNOSIS — Z00.00 ANNUAL PHYSICAL EXAM: Primary | ICD-10-CM

## 2023-11-21 PROCEDURE — 99396 PREV VISIT EST AGE 40-64: CPT | Performed by: FAMILY MEDICINE

## 2023-11-21 RX ORDER — MELOXICAM 15 MG/1
TABLET ORAL
COMMUNITY
Start: 2023-11-20

## 2023-11-21 NOTE — PROGRESS NOTES
[facility]    NAME: Harpreet Wilson  AGE: 62 y.o. SEX: male  : 1965     DATE: 2023     Assessment and Plan:     Problem List Items Addressed This Visit     Annual physical exam - Primary     Patient was seen for annual physical exam.  Reviewed recent blood work which was fairly unremarkable with the exception of slight increase in his cholesterol. He has been a little less active than usual due to foot injury which she is treating with podiatry. He has some issues with gas and bloating. We discussed diet. Recommended consideration of food diary to track any potential patterns. Has had no change in bowel habit and he is current on his colon screenings. Immunizations are up-to-date. Immunizations and preventive care screenings were discussed with patient today. Appropriate education was printed on patient's after visit summary. Discussed risks and benefits of prostate cancer screening. We discussed the controversial history of PSA screening for prostate cancer in the [facility] as well as the risk of over detection and over treatment of prostate cancer by way of PSA screening. The patient understands that PSA blood testing is an imperfect way to screen for prostate cancer and that elevated PSA levels in the blood may also be caused by infection, inflammation, prostatic trauma or manipulation, urological procedures, or by benign prostatic enlargement. The role of the digital rectal examination in prostate cancer screening was also discussed and I discussed with him that there is large interobserver variability in the findings of digital rectal examination. Counseling:  Alcohol/drug use: discussed moderation in alcohol intake, the recommendations for healthy alcohol use, and avoidance of illicit drug use.   Dental Health: discussed importance of regular tooth brushing, flossing, and dental visits. Injury prevention: discussed safety/seat belts, safety helmets, smoke detectors, carbon dioxide detectors, and smoking near bedding or upholstery. Exercise: the importance of regular exercise/physical activity was discussed. Recommend exercise 3-5 times per week for at least 30 minutes. Return in about 1 year (around 11/21/2024) for Annual physical.     Chief Complaint:     Chief Complaint   Patient presents with   • Physical Exam      History of Present Illness:     Adult Annual Physical   Patient here for a comprehensive physical exam. The patient reports  muscular aches and pains and some GI symptoms. Complains of gas and bloating unrelated to particular foods or activities. No change in bowel habit. .    Diet and Physical Activity  Diet/Nutrition: well balanced diet and limited fruits/vegetables. Exercise: no formal exercise. Depression Screening  PHQ-2/9 Depression Screening    Little interest or pleasure in doing things: 0 - not at all  Feeling down, depressed, or hopeless: 0 - not at all       General Health  Sleep: gets 7-8 hours of sleep on average. Hearing: normal - bilateral.  Vision: goes for regular eye exams, most recent eye exam <1 year ago, wears glasses, and wears contacts. Dental: regular dental visits.  Health  Symptoms include: none    Advanced Care Planning  Do you have an advanced directive? yes  Do you have a durable medical power of ? yes     Review of Systems:     Review of Systems   Constitutional: Negative. HENT: Negative. Negative for congestion, ear pain, hearing loss, nosebleeds, sore throat and trouble swallowing. Eyes: Negative. Respiratory: Negative. Negative for apnea, cough, chest tightness, shortness of breath and wheezing. Cardiovascular: Negative. Gastrointestinal:  Positive for abdominal distention. Negative for abdominal pain, blood in stool, constipation, diarrhea, nausea and vomiting. Endocrine: Negative. Genitourinary: Negative. Negative for difficulty urinating, dysuria, frequency, hematuria and urgency. Musculoskeletal: Negative. Negative for arthralgias, joint swelling and myalgias. Skin:  Negative for rash. Neurological:  Negative for dizziness, syncope, light-headedness, numbness and headaches. Hematological: Negative. Psychiatric/Behavioral: Negative. Negative for confusion, dysphoric mood and sleep disturbance. The patient is not nervous/anxious. Past Medical History:     Past Medical History:   Diagnosis Date   • Asthma    • Diverticulitis       Past Surgical History:     Past Surgical History:   Procedure Laterality Date   • ANKLE SURGERY     • BOWEL RESECTION     • KNEE SURGERY     • SHOULDER SURGERY     • TONSILLECTOMY     • WISDOM TOOTH EXTRACTION        Family History:     Family History   Problem Relation Age of Onset   • Breast cancer Mother       Social History:     Social History     Socioeconomic History   • Marital status: /Civil Union     Spouse name: None   • Number of children: None   • Years of education: None   • Highest education level: None   Occupational History   • None   Tobacco Use   • Smoking status: Never   • Smokeless tobacco: Never   Vaping Use   • Vaping Use: Never used   Substance and Sexual Activity   • Alcohol use:  Yes   • Drug use: No   • Sexual activity: Yes     Partners: Female   Other Topics Concern   • None   Social History Narrative   • None     Social Determinants of Health     Financial Resource Strain: Not on file   Food Insecurity: Not on file   Transportation Needs: Not on file   Physical Activity: Not on file   Stress: Not on file   Social Connections: Not on file   Intimate Partner Violence: Not on file   Housing Stability: Not on file      Current Medications:     Current Outpatient Medications   Medication Sig Dispense Refill   • Acai Wilson 500 MG CAPS 2,000 mg     • albuterol (2.5 mg/3 mL) 0.083 % nebulizer solution Inhale 1 each • Ascorbic Acid (vitamin C) 100 MG tablet Take 100 mg by mouth daily     • aspirin 81 MG tablet Take 1 capsule by mouth daily     • b complex-C-E-zinc (STRESS FORMULA/ZINC) tablet Take 1 tablet by mouth daily     • B-Complex-C CAPS take one cap by mouth once daily     • Cholecalciferol (VITAMIN D3) 1000 units CAPS Take 1 tablet by mouth daily     • co-enzyme Q-10 100 mg capsule Take 100 mg by mouth     • meloxicam (MOBIC) 15 mg tablet      • Multiple Vitamins-Minerals (MULTIVITAMIN WITH MINERALS) tablet Take 1 tablet by mouth daily     • mupirocin (BACTROBAN) 2 % ointment Apply topically Three times a day     • Omega-3 Fatty Acids (FISH OIL) 1200 MG CAPS Take 1 capsule by mouth daily     • QUERCETIN PO Take by mouth     • Saccharomyces boulardii (PROBIOTIC) 250 MG CAPS Take by mouth     • SYMBICORT 160-4.5 MCG/ACT inhaler INL 2 PUFFS PO Q 12 H  3   • Turmeric 500 MG CAPS Take 1 capsule by mouth     • promethazine-dextromethorphan (PHENERGAN-DM) 6.25-15 mg/5 mL oral syrup Take 5 mL by mouth 4 (four) times a day as needed       No current facility-administered medications for this visit. Allergies: Allergies   Allergen Reactions   • Methylprednisolone Other (See Comments)   • Bee Venom Other (See Comments)     swelling      Physical Exam:     /88 (BP Location: Left arm, Patient Position: Sitting, Cuff Size: Standard)   Pulse 100   Temp 97.8 °F (36.6 °C) (Temporal)   Resp 20   Ht 6' 3" (1.905 m)   Wt 109 kg (241 lb)   SpO2 99%   BMI 30.12 kg/m²     Physical Exam  Vitals and nursing note reviewed. Constitutional:       Appearance: Normal appearance. He is well-developed. HENT:      Head: Normocephalic. Right Ear: External ear normal.      Left Ear: External ear normal.      Nose: Nose normal.      Mouth/Throat:      Mouth: Mucous membranes are moist.   Eyes:      Conjunctiva/sclera: Conjunctivae normal.      Pupils: Pupils are equal, round, and reactive to light.    Cardiovascular: Rate and Rhythm: Normal rate and regular rhythm. Heart sounds: Normal heart sounds. Pulmonary:      Effort: Pulmonary effort is normal.      Breath sounds: Normal breath sounds. Abdominal:      General: Bowel sounds are normal.      Palpations: Abdomen is soft. Musculoskeletal:         General: Normal range of motion. Cervical back: Normal range of motion and neck supple. Skin:     General: Skin is warm and dry. Neurological:      Mental Status: He is alert. Psychiatric:         Mood and Affect: Mood normal.         Behavior: Behavior normal.         Thought Content:  Thought content normal.         Judgment: Judgment normal.          [facility], DO  [address] [facility]

## 2023-11-21 NOTE — ASSESSMENT & PLAN NOTE
Patient was seen for annual physical exam.  Reviewed recent blood work which was fairly unremarkable with the exception of slight increase in his cholesterol. He has been a little less active than usual due to foot injury which she is treating with podiatry. He has some issues with gas and bloating. We discussed diet. Recommended consideration of food diary to track any potential patterns. Has had no change in bowel habit and he is current on his colon screenings. Immunizations are up-to-date.

## 2023-12-18 ENCOUNTER — APPOINTMENT (OUTPATIENT)
Dept: RADIOLOGY | Facility: MEDICAL CENTER | Age: 58
End: 2023-12-18
Payer: COMMERCIAL

## 2023-12-18 DIAGNOSIS — J45.991 COUGH VARIANT ASTHMA: ICD-10-CM

## 2023-12-18 DIAGNOSIS — J45.901 ASTHMATIC BRONCHITIS WITH ACUTE EXACERBATION, UNSPECIFIED ASTHMA SEVERITY, UNSPECIFIED WHETHER PERSISTENT: ICD-10-CM

## 2023-12-18 PROCEDURE — 71046 X-RAY EXAM CHEST 2 VIEWS: CPT

## 2024-01-22 ENCOUNTER — TELEPHONE (OUTPATIENT)
Age: 59
End: 2024-01-22

## 2024-01-22 NOTE — TELEPHONE ENCOUNTER
I was trying to put in a FAA physical and was blocked it said do warm transfer to office when I tried that it put the call right back to pod. Please call patient and schedule for the week of 2/12.

## 2024-02-12 ENCOUNTER — OFFICE VISIT (OUTPATIENT)
Dept: FAMILY MEDICINE CLINIC | Facility: CLINIC | Age: 59
End: 2024-02-12

## 2024-02-12 VITALS
SYSTOLIC BLOOD PRESSURE: 122 MMHG | BODY MASS INDEX: 30.46 KG/M2 | WEIGHT: 245 LBS | DIASTOLIC BLOOD PRESSURE: 82 MMHG | HEART RATE: 92 BPM | HEIGHT: 75 IN

## 2024-02-12 DIAGNOSIS — Z02.89 ENCOUNTER FOR FEDERAL AVIATION ADMINISTRATION (FAA) EXAMINATION: Primary | ICD-10-CM

## 2024-02-12 PROCEDURE — 99499 UNLISTED E&M SERVICE: CPT | Performed by: FAMILY MEDICINE

## 2024-02-12 PROCEDURE — 93000 ELECTROCARDIOGRAM COMPLETE: CPT | Performed by: FAMILY MEDICINE

## 2024-02-12 RX ORDER — HYDROXYZINE HYDROCHLORIDE 25 MG/1
25 TABLET, FILM COATED ORAL EVERY 6 HOURS PRN
COMMUNITY
Start: 2024-01-29

## 2024-02-12 RX ORDER — ALBUTEROL SULFATE 90 UG/1
2 AEROSOL, METERED RESPIRATORY (INHALATION) EVERY 4 HOURS PRN
COMMUNITY
Start: 2023-12-01

## 2024-02-12 RX ORDER — AZITHROMYCIN 500 MG/1
TABLET, FILM COATED ORAL
COMMUNITY
Start: 2024-01-29

## 2024-02-12 RX ORDER — PREDNISONE 10 MG/1
TABLET ORAL
COMMUNITY
Start: 2024-01-29

## 2024-02-12 NOTE — PROGRESS NOTES
Chief Complaint   Patient presents with   • Physical Exam     FAA 1st class EKG, wears correctives

## 2024-04-02 ENCOUNTER — TELEPHONE (OUTPATIENT)
Age: 59
End: 2024-04-02

## 2024-04-02 NOTE — TELEPHONE ENCOUNTER
Pt was at [facility] last week and called in for follow up appt but he had restrictions in picking the schedule so warm transferred the call to practice was answered by Dorothy and helped to schedule the patient.

## 2024-04-10 ENCOUNTER — OFFICE VISIT (OUTPATIENT)
Dept: FAMILY MEDICINE CLINIC | Facility: CLINIC | Age: 59
End: 2024-04-10
Payer: COMMERCIAL

## 2024-04-10 VITALS
BODY MASS INDEX: 31.42 KG/M2 | DIASTOLIC BLOOD PRESSURE: 100 MMHG | SYSTOLIC BLOOD PRESSURE: 128 MMHG | WEIGHT: 244.8 LBS | OXYGEN SATURATION: 97 % | HEART RATE: 89 BPM | HEIGHT: 74 IN | TEMPERATURE: 98 F

## 2024-04-10 DIAGNOSIS — B34.9 VIRAL SYNDROME: Primary | ICD-10-CM

## 2024-04-10 PROCEDURE — 99213 OFFICE O/P EST LOW 20 MIN: CPT | Performed by: FAMILY MEDICINE

## 2024-04-10 NOTE — PROGRESS NOTES
Name: Filipe Hill      : 1965      MRN: [id number]  Encounter Provider: Nicholas Cortez DO  Encounter Date: 4/10/2024   Encounter department: [facility]    Assessment & Plan     1. Viral syndrome    Based on the patient's history and negative workup in the emergency room it is likely that he experienced a viral syndrome.  This episode appears to have exacerbated his pre-existing IBS symptoms.  I have advised him to follow a bland diet, limit caffeine and avoid alcohol for the next 1 to 2 weeks and if his symptoms do not improve we will refer him to gastroenterology.       Subjective      Patient presents to follow-up on his recent hospital stay.  Patient was admitted overnight to [facility] after presenting with dizziness and chest discomfort.  He had blood work imaging studies and received IV fluids overnight his symptoms resolved completely and then he was discharged.  The following day he developed nausea and diarrhea which lasted for approximately 2 days.  This has now resolved completely though he does feel some fatigue and some increased bloating in his abdomen.  He has had issues with bloating in his abdomen in the past though that has been much more frequent in the last 10 days since his hospitalization.      Review of Systems   Constitutional: Negative.    Respiratory: Negative.     Cardiovascular: Negative.    Gastrointestinal:  Positive for abdominal pain.   Genitourinary: Negative.    Musculoskeletal: Negative.    Psychiatric/Behavioral: Negative.         Current Outpatient Medications on File Prior to Visit   Medication Sig   • Acai Berry 500 MG CAPS 2,000 mg   • albuterol (2.5 mg/3 mL) 0.083 % nebulizer solution Inhale 1 each   • albuterol (PROVENTIL HFA,VENTOLIN HFA) 90 mcg/act inhaler Inhale 2 puffs every 4 (four) hours as needed   • Ascorbic Acid (vitamin C) 100 MG tablet Take 100 mg by mouth daily   • aspirin 81 MG tablet Take 1 capsule by mouth  "daily   • b complex-C-E-zinc (STRESS FORMULA/ZINC) tablet Take 1 tablet by mouth daily   • B-Complex-C CAPS take one cap by mouth once daily   • Cholecalciferol (VITAMIN D3) 1000 units CAPS Take 1 tablet by mouth daily   • co-enzyme Q-10 100 mg capsule Take 100 mg by mouth   • Multiple Vitamins-Minerals (MULTIVITAMIN WITH MINERALS) tablet Take 1 tablet by mouth daily   • Omega-3 Fatty Acids (FISH OIL) 1200 MG CAPS Take 1 capsule by mouth daily   • Saccharomyces boulardii (PROBIOTIC) 250 MG CAPS Take by mouth   • SYMBICORT 160-4.5 MCG/ACT inhaler INL 2 PUFFS PO Q 12 H   • Turmeric 500 MG CAPS Take 1 capsule by mouth   • azithromycin (ZITHROMAX) 500 MG tablet 1 TABLETS ORALLY DAILY FOR 15 DAYS (Patient not taking: Reported on 4/10/2024)   • hydrOXYzine HCL (ATARAX) 25 mg tablet Take 25 mg by mouth every 6 (six) hours as needed for itching (Patient not taking: Reported on 4/10/2024)   • predniSONE 10 mg tablet 40MG X4DAYS, 30MG X3 DAYS, 20MG X2 DAYS, 10 MG X1 DAY (Patient not taking: Reported on 4/10/2024)   • QUERCETIN PO Take by mouth (Patient not taking: Reported on 4/10/2024)       Objective     /100 (BP Location: Left arm, Patient Position: Sitting, Cuff Size: Adult)   Pulse 89   Temp 98 °F (36.7 °C)   Ht 6' 2\" (1.88 m)   Wt 111 kg (244 lb 12.8 oz)   SpO2 97%   BMI 31.43 kg/m²     Physical Exam  Vitals and nursing note reviewed.   Constitutional:       General: He is not in acute distress.     Appearance: Normal appearance. He is well-developed. He is not diaphoretic.   HENT:      Head: Normocephalic and atraumatic.   Eyes:      General:         Right eye: No discharge.      Conjunctiva/sclera: Conjunctivae normal.      Pupils: Pupils are equal, round, and reactive to light.   Neck:      Thyroid: No thyromegaly.   Cardiovascular:      Rate and Rhythm: Normal rate and regular rhythm.   Pulmonary:      Effort: Pulmonary effort is normal. No respiratory distress.      Breath sounds: Normal breath sounds. "   Musculoskeletal:      Cervical back: Normal range of motion.   Lymphadenopathy:      Cervical: No cervical adenopathy.   Skin:     General: Skin is warm and dry.   Neurological:      Mental Status: He is alert and oriented to person, place, and time.   Psychiatric:         Mood and Affect: Mood normal.         Behavior: Behavior normal.         Thought Content: Thought content normal.         Judgment: Judgment normal.       Nicholas Cortez, DO

## 2024-05-09 ENCOUNTER — EVALUATION (OUTPATIENT)
Dept: PHYSICAL THERAPY | Facility: MEDICAL CENTER | Age: 59
End: 2024-05-09
Payer: COMMERCIAL

## 2024-05-09 DIAGNOSIS — M79.671 RIGHT FOOT PAIN: Primary | ICD-10-CM

## 2024-05-09 PROCEDURE — 97161 PT EVAL LOW COMPLEX 20 MIN: CPT | Performed by: PHYSICAL THERAPIST

## 2024-05-09 NOTE — PROGRESS NOTES
PT Evaluation     Today's date: 2024  Patient name: Filipe Hill  : 1965  MRN: [id number]  Referring provider: Esequiel Blair MD  Dx:   Encounter Diagnosis     ICD-10-CM    1. Right foot pain  M79.671                      Assessment  Assessment details: Pt is a pleasant 60 yo male presenting to physical therapy with R foot pain.  Pt would benefit from skilled PT to address current impairments and return pt to pre-morbid function.  Understanding of Dx/Px/POC: good   Prognosis: good    Goals  Impairment Goals  - Pt I with initial HEP in 1-2 visits  - Improve ROM equal to contralateral side in 4-6 weeks  - Increase strength to 5/5 in all affected areas in 4-6 weeks    Functional Goals  - Increase FOTO to at least 75 in 6-8 weeks  - Patient will be independent with comprehensive HEP in 6-8 weeks  - Ambulation is improved to prior level of function in 6-8 weeks  - Patient will be able to return to pre-morbid activity level with min to no difficulty or discomfort in 6-8 weeks     Plan  Patient would benefit from: skilled physical therapy  Other planned modality interventions: Modalities prn  Planned therapy interventions: manual therapy, patient education, strengthening, stretching, therapeutic activities, therapeutic exercise, balance and home exercise program  Frequency: 2x week  Duration in weeks: 8  Treatment plan discussed with: patient        Subjective Evaluation    History of Present Illness  Mechanism of injury: Pt had a bad R ankle sprain in college.  It was pretty good until  when he was having trouble with his plantar fascia.  He had an arthroscopy where they cleaned out the ankle and did a fasciotomy.  That relieved his sxs and he was good until about a year ago.  Pt has had burning and pain on the top of his foot to the point where he had trouble running.  He has tried a variety of things that have reduced his sxs, but not eliminated them.       It is typically worse when he gets up to walk  "and sitting, following activity.   Patient Goals  Patient goals for therapy: decreased pain and return to sport/leisure activities    Pain  Current pain ratin  At best pain ratin  At worst pain ratin  Quality: burning and sharp    Social Support    Employment status: working ()  Exercise history: softball, working out at the gym, walking when his foot doesn't hurt      Diagnostic Tests  MRI studies: normal  Treatments  Treatments tried: orthotics, new shoes, noro-tens, acupunture.    Objective     Observations     Additional Observation Details  Gait:  Pt Wbs entirely on the lateral portion of his R foot during the stance phase    Balance:  Decreased R LE    Palpation     Additional Palpation Details  + TTP R sinus tarsi     Neurological Testing     Sensation     Ankle/Foot   Left Ankle/Foot   Intact: light touch    Right Ankle/Foot   Intact: light touch     Active Range of Motion   Left Ankle/Foot   Dorsiflexion (ke): 5 degrees   Plantar flexion: WFL  Inversion: WFL  Eversion: WFL    Right Ankle/Foot   Dorsiflexion (ke): 0 degrees   Plantar flexion: WFL  Inversion: WFL  Eversion: WFL    Joint Play   Left Ankle/Foot  Hypomobile in the talocrural joint.     Right Ankle/Foot  Hypomobile in the talocrural joint.     Additional Joint Play Details  R TC joint is significantly more hypomobile than the L    Strength/Myotome Testing     Left Ankle/Foot   Dorsiflexion: 5  Plantar flexion: 5  Inversion: 5  Eversion: 5    Right Ankle/Foot   Dorsiflexion: 5  Plantar flexion: 5  Inversion: 3+  Eversion: 5             Precautions: None      Manuals             TC jt mobs HK                                                   Ther Ex             Bike NV            Ankle mobility  Y  x30            Slant str 3x30\"            Stand DF 3x10            Step downs NV            SLS  NV            T-band IV NV  Red                                                                                                        "

## 2024-05-13 ENCOUNTER — OFFICE VISIT (OUTPATIENT)
Dept: PHYSICAL THERAPY | Facility: MEDICAL CENTER | Age: 59
End: 2024-05-13
Payer: COMMERCIAL

## 2024-05-13 DIAGNOSIS — M79.671 RIGHT FOOT PAIN: Primary | ICD-10-CM

## 2024-05-13 PROCEDURE — 97110 THERAPEUTIC EXERCISES: CPT | Performed by: PHYSICAL THERAPIST

## 2024-05-13 PROCEDURE — 97112 NEUROMUSCULAR REEDUCATION: CPT | Performed by: PHYSICAL THERAPIST

## 2024-05-13 PROCEDURE — 97140 MANUAL THERAPY 1/> REGIONS: CPT | Performed by: PHYSICAL THERAPIST

## 2024-05-13 NOTE — PROGRESS NOTES
"Daily Note     Today's date: 2024  Patient name: Filipe Hill  : 1965  MRN: [id number]  Referring provider: Esequiel Blair MD  Dx:   Encounter Diagnosis     ICD-10-CM    1. Right foot pain  M79.671                      Subjective: Pt reports that he felt really good on Thursday, but had some return of foot pain over the weekend.  He did realize how immobile his R ankle is when working out on Friday at the gym.       Objective: See treatment diary below      Assessment: Tolerated treatment well. Patient demonstrated fatigue post treatment, exhibited good technique with therapeutic exercises, and would benefit from continued PT      Plan: Continue per plan of care.      Precautions: None      Manuals            TC jt mobs HK HK                                                  Ther Ex            Bike NV 15'           Ankle mobility  Y  x30 Y  30           Slant str 3x30\" 3x30\"           Stand DF 3x10 3x30           Step downs NV L2  3x10           SLS  NV NV           T-band IV NV  Red NV                                                                                                                          "

## 2024-05-20 ENCOUNTER — OFFICE VISIT (OUTPATIENT)
Dept: PHYSICAL THERAPY | Facility: MEDICAL CENTER | Age: 59
End: 2024-05-20
Payer: COMMERCIAL

## 2024-05-20 DIAGNOSIS — M79.671 RIGHT FOOT PAIN: Primary | ICD-10-CM

## 2024-05-20 PROCEDURE — 97140 MANUAL THERAPY 1/> REGIONS: CPT | Performed by: PHYSICAL THERAPIST

## 2024-05-20 PROCEDURE — 97110 THERAPEUTIC EXERCISES: CPT | Performed by: PHYSICAL THERAPIST

## 2024-05-20 NOTE — PROGRESS NOTES
"Daily Note     Today's date: 2024  Patient name: Filipe Hill  : 1965  MRN: [id number]  Referring provider: Esequiel Blair MD  Dx:   Encounter Diagnosis     ICD-10-CM    1. Right foot pain  M79.671                      Subjective: Pt reports that his foot felt good over the weekend, but did not when he tried to play softball yesterday.  He was unable to do any stretching or mobility work prior to trying to play.  Pt was also wearing cleats.       Objective: See treatment diary below      Assessment: Tolerated treatment well. Patient demonstrated fatigue post treatment, exhibited good technique with therapeutic exercises, and would benefit from continued PT      Plan: Continue per plan of care.      Precautions: None      Manuals           TC jt mobs HK HK HK                                                 Ther Ex           Bike NV 15' 10'          Ankle mobility  Y  x30 Y  30 Y  x30          Slant str 3x30\" 3x30\" 3x30\"          Stand DF 3x10 3x30 3x30          Step downs NV L2  3x10 L2  x30          SLS  NV NV NV          T-band IV NV  Red NV NV                                                                                                                              "

## 2024-05-23 ENCOUNTER — OFFICE VISIT (OUTPATIENT)
Dept: PHYSICAL THERAPY | Facility: MEDICAL CENTER | Age: 59
End: 2024-05-23
Payer: COMMERCIAL

## 2024-05-23 DIAGNOSIS — M79.671 RIGHT FOOT PAIN: Primary | ICD-10-CM

## 2024-05-23 PROCEDURE — 97140 MANUAL THERAPY 1/> REGIONS: CPT | Performed by: PHYSICAL THERAPIST

## 2024-05-23 PROCEDURE — 97110 THERAPEUTIC EXERCISES: CPT | Performed by: PHYSICAL THERAPIST

## 2024-05-23 NOTE — PROGRESS NOTES
"Daily Note     Today's date: 2024  Patient name: Filipe Hill  : 1965  MRN: [id number]  Referring provider: Esequiel Blair MD  Dx:   Encounter Diagnosis     ICD-10-CM    1. Right foot pain  M79.671                      Subjective: Pt reports that he has had more pain in his foot and ankle since last night.  The only thing he can remember is lunging for first base when playing softball last night and his R ankle       Objective: See treatment diary below    + mild swelling R foot     Assessment: Tolerated treatment well. Patient demonstrated fatigue post treatment, exhibited good technique with therapeutic exercises, and would benefit from continued PT      Plan: Continue per plan of care.      Precautions: None      Manuals          TC jt mobs HK HK HK                                                 Ther Ex          Bike NV 15' 10' 10'         Ankle mobility  Y  x30 Y  30 Y  x30 Y  x30         Slant str 3x30\" 3x30\" 3x30\" 3x30\"         Stand DF 3x10 3x30 3x30 3x30\"         Step downs NV L2  3x10 L2  x30 L2  x30         SLS  NV NV NV NV         T-band IV NV  Red NV NV Red  3x10                                                                                       MH    15'                                    "

## 2024-05-28 ENCOUNTER — OFFICE VISIT (OUTPATIENT)
Dept: PHYSICAL THERAPY | Facility: MEDICAL CENTER | Age: 59
End: 2024-05-28
Payer: COMMERCIAL

## 2024-05-28 DIAGNOSIS — M79.671 RIGHT FOOT PAIN: Primary | ICD-10-CM

## 2024-05-28 PROCEDURE — 97110 THERAPEUTIC EXERCISES: CPT

## 2024-05-28 NOTE — PROGRESS NOTES
"Daily Note     Today's date: 2024  Patient name: Filipe Hill  : 1965  MRN: [id number]  Referring provider: Esequiel Blair MD  Dx:   Encounter Diagnosis     ICD-10-CM    1. Right foot pain  M79.671                      Subjective: Pt reports that he has had more pain in his foot and ankle since last night.  The only thing he can remember is lunging for first base when playing softball last night and his R ankle       Objective: See treatment diary below        Assessment: Tolerated treatment well. Challenged with SLS today. Added in ankle inv and SLS to his HEP.  Soreness noted to end following [place] exercise. Patient demonstrated fatigue post treatment, exhibited good technique with therapeutic exercises, and would benefit from continued PT      Plan: Continue per plan of care.      Precautions: None      Manuals         TC jt mobs HK HK HK  HK                                               Ther Ex         Bike NV 15' 10' 10' 10'        Ankle mobility  Y  x30 Y  30 Y  x30 Y  x30 Y x30        Slant str 3x30\" 3x30\" 3x30\" 3x30\" 3x30\"        Stand DF 3x10 3x30 3x30 3x30\" 3x30\"        Step downs NV L2  3x10 L2  x30 L2  x30 L2 x30        SLS  NV NV NV NV 30\"x3        T-band IV NV  Red NV NV Red  3x10 Red 3x10                                                                                      MH    15'                                    "

## 2024-05-30 ENCOUNTER — OFFICE VISIT (OUTPATIENT)
Dept: PHYSICAL THERAPY | Facility: MEDICAL CENTER | Age: 59
End: 2024-05-30
Payer: COMMERCIAL

## 2024-05-30 DIAGNOSIS — M79.671 RIGHT FOOT PAIN: Primary | ICD-10-CM

## 2024-05-30 PROCEDURE — 97110 THERAPEUTIC EXERCISES: CPT | Performed by: PHYSICAL THERAPIST

## 2024-05-30 PROCEDURE — 97140 MANUAL THERAPY 1/> REGIONS: CPT | Performed by: PHYSICAL THERAPIST

## 2024-05-30 PROCEDURE — 97112 NEUROMUSCULAR REEDUCATION: CPT | Performed by: PHYSICAL THERAPIST

## 2024-05-30 NOTE — PROGRESS NOTES
"Daily Note     Today's date: 2024  Patient name: Filipe Hill  : 1965  MRN: [id number]  Referring provider: Esequiel Blair MD  Dx:   Encounter Diagnosis     ICD-10-CM    1. Right foot pain  M79.671                      Subjective: Pt reports that his foot is feeling pretty good today.  He hasn't done any dynamic activities since his last visit.        Objective: See treatment diary below      Assessment: Tolerated treatment well. Patient demonstrated fatigue post treatment, exhibited good technique with therapeutic exercises, and would benefit from continued PT      Plan: Continue per plan of care.      Precautions: None      Manuals        TC jt mobs HK HK HK  HK HK                                              Ther Ex        Bike NV 15' 10' 10' 10' 10'       Ankle mobility  Y  x30 Y  30 Y  x30 Y  x30 Y x30 Y  x30       Slant str 3x30\" 3x30\" 3x30\" 3x30\" 3x30\" 3x30\"       Stand DF 3x10 3x30 3x30 3x30\" 3x30 3x30       Step downs NV L2  3x10 L2  x30 L2  x30 L2 x30 L3  x30       SLS  NV NV NV NV 30\"x3 3x  30\"       T-band IV NV  Red NV NV Red  3x10 Red 3x10 Green  3x15       BAPS      L2  x30                                                                        MH    15'                                      "

## 2024-06-06 ENCOUNTER — OFFICE VISIT (OUTPATIENT)
Dept: PHYSICAL THERAPY | Facility: MEDICAL CENTER | Age: 59
End: 2024-06-06
Payer: COMMERCIAL

## 2024-06-06 DIAGNOSIS — M79.671 RIGHT FOOT PAIN: Primary | ICD-10-CM

## 2024-06-06 PROCEDURE — 97112 NEUROMUSCULAR REEDUCATION: CPT | Performed by: PHYSICAL THERAPIST

## 2024-06-06 PROCEDURE — 97140 MANUAL THERAPY 1/> REGIONS: CPT | Performed by: PHYSICAL THERAPIST

## 2024-06-06 PROCEDURE — 97110 THERAPEUTIC EXERCISES: CPT | Performed by: PHYSICAL THERAPIST

## 2024-06-06 NOTE — PROGRESS NOTES
"Daily Note     Today's date: 2024  Patient name: Filipe Hill  : 1965  MRN: [id number]  Referring provider: Esequiel Blair MD  Dx:   Encounter Diagnosis     ICD-10-CM    1. Right foot pain  M79.671                      Subjective: Pt reports that he hasn't played softball, so his foot/ankle is feeling pretty good.  He was able to run across the street the other day without having pain.        Objective: See treatment diary below      Assessment: Tolerated treatment well. Patient demonstrated fatigue post treatment, exhibited good technique with therapeutic exercises, and would benefit from continued PT.  Ankle mobility is slowly improving.       Plan: Continue per plan of care.      Precautions: None      Manuals       TC jt mobs HK HK HK  HK HK HK                                             Ther Ex       Bike NV 15' 10' 10' 10' 10' 10'      Ankle mobility  Y  x30 Y  30 Y  x30 Y  x30 Y x30 Y  x30 Y  x30      Slant str 3x30\" 3x30\" 3x30\" 3x30\" 3x30\" 3x30\" 3x30\"      Stand DF 3x10 3x30 3x30 3x30\" 3x30 3x30 3x30      Step downs NV L2  3x10 L2  x30 L2  x30 L2 x30 L3  x30 L3  x30      SLS  NV NV NV NV 30\"x3 3x  30\" 3x30\"      T-band IV NV  Red NV NV Red  3x10 Red 3x10 Green  3x15 Green  3x15      BAPS      L2  x30 L2  x30                                                                       MH    15'   15'                                     "

## 2024-06-10 ENCOUNTER — OFFICE VISIT (OUTPATIENT)
Dept: PHYSICAL THERAPY | Facility: MEDICAL CENTER | Age: 59
End: 2024-06-10
Payer: COMMERCIAL

## 2024-06-10 DIAGNOSIS — M79.671 RIGHT FOOT PAIN: Primary | ICD-10-CM

## 2024-06-10 PROCEDURE — 97140 MANUAL THERAPY 1/> REGIONS: CPT | Performed by: PHYSICAL THERAPIST

## 2024-06-10 NOTE — PROGRESS NOTES
"Daily Note     Today's date: 6/10/2024  Patient name: Filipe Hill  : 1965  MRN: [id number]  Referring provider: Esequiel Blair MD  Dx:   Encounter Diagnosis     ICD-10-CM    1. Right foot pain  M79.671                      Subjective: Pt reports that he was running the bases in a softball game and felt a pop in his R foot.  He was able to finish the game, but could not run well.        Objective: See treatment diary below    Gait:  Incredibly antalgic with decreased stance time right     + TTP interspace between first and second MTP    Assessment: Tolerated treatment fair. Patient would benefit from continued PT.  Pt demonstrated improved ability to WB post tx today.        Plan: Continue per plan of care.      Precautions: None      Manuals 5/9 5/13 5/20 5/23 5/28 5/30 6/6 6/10     TC jt mobs HK HK HK  HK HK HK HK     Mid foot mobs        HK     Sub talar mobs        HK                  Ther Ex 5/9 5/13 5/20 5/23 5/28 5/30 6/6 6/10     Bike NV 15' 10' 10' 10' 10 10' NP     Ankle mobility  Y  x30 Y  30 Y  x30 Y  x30 Y x30 Y  x30 Y  x30 NP     Slant str 3x30\" 3x30\" 3x30\" 3x30\" 3x30\" 3x30\" 3x30\" NP     Stand DF 3x10 3x30 3x30 3x30\" 3x30 3x30 3x30 NP     Step downs NV L2  3x10 L2  x30 L2  x30 L2 x30 L3  x30 L3  x30 NP     SLS  NV NV NV NV 30\"x3 3x  30\" 3x30\" NP     T-band IV NV  Red NV NV Red  3x10 Red 3x10 Green  3x15 Green  3x15 NP     BAPS      L2  x30 L2  x30 NP                                                                      MH    15'   15' 15'                                      "

## 2024-06-13 ENCOUNTER — OFFICE VISIT (OUTPATIENT)
Dept: PHYSICAL THERAPY | Facility: MEDICAL CENTER | Age: 59
End: 2024-06-13
Payer: COMMERCIAL

## 2024-06-13 DIAGNOSIS — M79.671 RIGHT FOOT PAIN: Primary | ICD-10-CM

## 2024-06-13 PROCEDURE — 97110 THERAPEUTIC EXERCISES: CPT | Performed by: PHYSICAL THERAPIST

## 2024-06-13 PROCEDURE — 97112 NEUROMUSCULAR REEDUCATION: CPT | Performed by: PHYSICAL THERAPIST

## 2024-06-13 PROCEDURE — 97140 MANUAL THERAPY 1/> REGIONS: CPT | Performed by: PHYSICAL THERAPIST

## 2024-06-13 NOTE — PROGRESS NOTES
"Daily Note     Today's date: 2024  Patient name: Filipe Hill  : 1965  MRN: [id number]  Referring provider: Esqeuiel Blair MD  Dx:   Encounter Diagnosis     ICD-10-CM    1. Right foot pain  M79.671                      Subjective: Pt reports that he is significantly better than he was on Monday.  He walked around his neighborhood yesterday and that made his foot sore, but he would not have been able to do it on Monday.  Pt can also wear his sneaker without significant pain.        Objective: See treatment diary below      Assessment: Tolerated treatment well. Patient demonstrated fatigue post treatment, exhibited good technique with therapeutic exercises, and would benefit from continued PT      Plan: Continue per plan of care.      Precautions: None      Manuals 5/9 5/13 5/20 5/23 5/28 5/30 6/6 6/10 6/13    TC jt mobs HK HK HK  HK HK HK HK HK    Mid foot mobs        HK HK    Sub talar mobs        HK HK                 Ther Ex 5/9 5/13 5/20 5/23 5/28 5/30 6/6 6/10 6/13    Bike NV 15' 10' 10' 10' 10 10' NP 10'    Ankle mobility  Y  x30 Y  30 Y  x30 Y  x30 Y x30 Y  x30 Y  x30 NP Y  x30    Slant str 3x30\" 3x30\" 3x30\" 3x30\" 3x30\" 3x30\" 3x30\" NP 3x30\"    Stand DF 3x10 3x30 3x30 3x30\" 3x30 3x30 3x30 NP 3x30    Step downs NV L2  3x10 L2  x30 L2  x30 L2 x30 L3  x30 L3  x30 NP L2  x30    SLS  NV NV NV NV 30\"x3 3x  30\" 3x30\" NP NP    T-band IV NV  Red NV NV Red  3x10 Red 3x10 Green  3x15 Green  3x15 NP Blue  3x15    BAPS      L2  x30 L2  x30 NP NP                                                                     MH    15'   15' 15' 15'                                       "

## 2024-06-20 ENCOUNTER — OFFICE VISIT (OUTPATIENT)
Dept: PHYSICAL THERAPY | Facility: MEDICAL CENTER | Age: 59
End: 2024-06-20
Payer: COMMERCIAL

## 2024-06-20 DIAGNOSIS — M79.671 RIGHT FOOT PAIN: Primary | ICD-10-CM

## 2024-06-20 PROCEDURE — 97140 MANUAL THERAPY 1/> REGIONS: CPT | Performed by: PHYSICAL THERAPIST

## 2024-06-20 PROCEDURE — 97112 NEUROMUSCULAR REEDUCATION: CPT | Performed by: PHYSICAL THERAPIST

## 2024-06-20 PROCEDURE — 97110 THERAPEUTIC EXERCISES: CPT | Performed by: PHYSICAL THERAPIST

## 2024-06-20 NOTE — PROGRESS NOTES
"Daily Note     Today's date: 2024  Patient name: Filipe Hill  : 1965  MRN: [id number]  Referring provider: Esequeil Blair MD  Dx:   Encounter Diagnosis     ICD-10-CM    1. Right foot pain  M79.671                      Subjective: Pt reports that he is still having trouble getting his R toe down during ambulation and activity.        Objective: See treatment diary below      Assessment: Tolerated treatment well. Patient demonstrated fatigue post treatment, exhibited good technique with therapeutic exercises, and would benefit from continued PT      Plan: Continue per plan of care.      Precautions: None      Manuals 5/9 5/13 5/20 5/23 5/28 5/30 6/6 6/10 6/13 6/20   TC jt mobs HK HK HK  HK HK HK HK HK HK   Mid foot mobs        HK HK HK   Sub talar mobs        HK HK HK                Ther Ex 5/9 5/13 5/20 5/23 5/28 5/30 6/6 6/10 6/13 6/20   Bike NV 15 10 10 10 10 10' NP 10 10   Ankle mobility  Y  x30 Y  30 Y  x30 Y  x30 Y x30 Y  x30 Y  x30 NP Y  x30 Y  x30   Slant str 3x30\" 3x30\" 3x30\" 3x30\" 3x30\" 3x30\" 3x30\" NP 3x30\" 3x30\"   Stand DF 3x10 3x30 3x30 3x30\" 3x30 3x30 3x30 NP 3x30 3x30  IV/EV   Step downs NV L2  3x10 L2  x30 L2  x30 L2 x30 L3  x30 L3  x30 NP L2  x30 L2  x30   SLS  NV NV NV NV 30\"x3 3x  30\" 3x30\" NP NP NP   T-band IV NV  Red NV NV Red  3x10 Red 3x10 Green  3x15 Green  3x15 NP Blue  3x15 Blue  3x15   BAPS      L2  x30 L2  x30 NP NP NP                                                                    MH    15'   15' 15' 15 15'                                        "

## 2024-06-24 ENCOUNTER — APPOINTMENT (OUTPATIENT)
Dept: RADIOLOGY | Facility: CLINIC | Age: 59
End: 2024-06-24
Payer: COMMERCIAL

## 2024-06-24 ENCOUNTER — APPOINTMENT (OUTPATIENT)
Dept: PHYSICAL THERAPY | Facility: MEDICAL CENTER | Age: 59
End: 2024-06-24
Payer: COMMERCIAL

## 2024-06-24 ENCOUNTER — OFFICE VISIT (OUTPATIENT)
Dept: FAMILY MEDICINE CLINIC | Facility: CLINIC | Age: 59
End: 2024-06-24
Payer: COMMERCIAL

## 2024-06-24 ENCOUNTER — TELEPHONE (OUTPATIENT)
Age: 59
End: 2024-06-24

## 2024-06-24 VITALS
TEMPERATURE: 98.6 F | BODY MASS INDEX: 31.33 KG/M2 | HEART RATE: 87 BPM | SYSTOLIC BLOOD PRESSURE: 128 MMHG | WEIGHT: 244 LBS | DIASTOLIC BLOOD PRESSURE: 90 MMHG | OXYGEN SATURATION: 99 %

## 2024-06-24 DIAGNOSIS — M79.671 CHRONIC PAIN IN RIGHT FOOT: Primary | ICD-10-CM

## 2024-06-24 DIAGNOSIS — G89.29 CHRONIC PAIN OF RIGHT ANKLE: ICD-10-CM

## 2024-06-24 DIAGNOSIS — M25.571 CHRONIC PAIN OF RIGHT ANKLE: ICD-10-CM

## 2024-06-24 DIAGNOSIS — G89.29 CHRONIC PAIN IN RIGHT FOOT: Primary | ICD-10-CM

## 2024-06-24 DIAGNOSIS — S99.921D INJURY OF RIGHT FOOT, SUBSEQUENT ENCOUNTER: ICD-10-CM

## 2024-06-24 DIAGNOSIS — G89.29 CHRONIC PAIN OF RIGHT ANKLE: Primary | ICD-10-CM

## 2024-06-24 DIAGNOSIS — M25.571 CHRONIC PAIN OF RIGHT ANKLE: Primary | ICD-10-CM

## 2024-06-24 PROCEDURE — 73610 X-RAY EXAM OF ANKLE: CPT

## 2024-06-24 PROCEDURE — 73630 X-RAY EXAM OF FOOT: CPT

## 2024-06-24 PROCEDURE — 99213 OFFICE O/P EST LOW 20 MIN: CPT | Performed by: NURSE PRACTITIONER

## 2024-06-24 NOTE — TELEPHONE ENCOUNTER
Patient called in regards to MRI foot/forefoot toes right w wo contrast and MRI ankle/heel right w wo contrast. Patient stated with contrast he will not be able to get in anytime soon, Patient stated if order is changed and the contrast is took in out patient can get in this week. Patient also stated that the procedure takes 3 hours and that's why it is hard for patient to get in if its with contrast. Patient also stated that the diagnoses code has to be changed on order due to insurance possibly not covering procedure with diagnoses code that it has now.Patient would like a call back once orders have been changed.

## 2024-06-24 NOTE — PROGRESS NOTES
Ambulatory Visit  Name: Filipe Hill      : 1965      MRN: [id number]  Encounter Provider: ОЛЕГ Whittaker  Encounter Date: 2024   Encounter department: [facility]    Assessment & Plan   1. Chronic pain of right ankle  -     MRI foot/forefoot toes right wo contrast; Future; Expected date: 2024  -     MRI ankle/heel right  wo contrast; Future; Expected date: 2024  2. Injury of right foot, subsequent encounter  -     MRI foot/forefoot toes right wo contrast; Future; Expected date: 2024  -     MRI ankle/heel right  wo contrast; Future; Expected date: 2024       History of Present Illness     Acute visit  Presents with right ankle pain. This has been an ongoing problem. Started several weeks ago. ,  while running during a softball game, he noted a sudden pop in his foot with acute onset of pain. Believes he may have twisted ankle inward. Reported difficulty bearing weight after incident.  Initial bruising.  Went to urgent care that night. Xrays unremarkable at that time. Has been in PT since.  Recently reaggravated.  Reports current pain lateral midfoot.  Describes as sharp/severe. Some radiation along lateral foot to ankle.  He denies any numbness, tingling or radicular pain.  He is here today requesting an MRI-per the recommendation of his physical therapist.  He does have an appointment already scheduled with orthopedic-Dr. Fernández  on  with [facility]                 Review of Systems   Musculoskeletal:         Foot pain as in HPI       Objective     /90 (BP Location: Left arm, Patient Position: Sitting, Cuff Size: Standard)   Pulse 87   Temp 98.6 °F (37 °C) (Temporal)   Wt 111 kg (244 lb)   SpO2 99%   BMI 31.33 kg/m²     Physical Exam  Vitals and nursing note reviewed.   Constitutional:       General: He is not in acute distress.     Appearance: Normal appearance. He is not ill-appearing.   Feet:      Comments: Lateral  foot pain. Mild swelling  Pulses intact  ROM decreased  Skin:     General: Skin is warm and dry.   Neurological:      General: No focal deficit present.      Mental Status: He is alert and oriented to person, place, and time.   Psychiatric:         Attention and Perception: Attention normal.         Behavior: Behavior normal.       Administrative Statements

## 2024-06-27 ENCOUNTER — APPOINTMENT (OUTPATIENT)
Dept: PHYSICAL THERAPY | Facility: MEDICAL CENTER | Age: 59
End: 2024-06-27
Payer: COMMERCIAL

## 2024-06-28 ENCOUNTER — HOSPITAL ENCOUNTER (OUTPATIENT)
Dept: RADIOLOGY | Facility: IMAGING CENTER | Age: 59
End: 2024-06-28
Payer: COMMERCIAL

## 2024-06-28 DIAGNOSIS — S99.921D INJURY OF RIGHT FOOT, SUBSEQUENT ENCOUNTER: ICD-10-CM

## 2024-06-28 DIAGNOSIS — M25.571 CHRONIC PAIN OF RIGHT ANKLE: ICD-10-CM

## 2024-06-28 DIAGNOSIS — G89.29 CHRONIC PAIN OF RIGHT ANKLE: ICD-10-CM

## 2024-06-28 PROCEDURE — 73721 MRI JNT OF LWR EXTRE W/O DYE: CPT

## 2024-06-28 PROCEDURE — 73718 MRI LOWER EXTREMITY W/O DYE: CPT

## 2024-07-02 ENCOUNTER — OFFICE VISIT (OUTPATIENT)
Dept: OBGYN CLINIC | Facility: CLINIC | Age: 59
End: 2024-07-02
Payer: COMMERCIAL

## 2024-07-02 ENCOUNTER — TELEPHONE (OUTPATIENT)
Age: 59
End: 2024-07-02

## 2024-07-02 VITALS — WEIGHT: 241.8 LBS | BODY MASS INDEX: 30.06 KG/M2 | HEIGHT: 75 IN

## 2024-07-02 DIAGNOSIS — M21.6X1 CAVOVARUS DEFORMITY OF FOOT, ACQUIRED, RIGHT: ICD-10-CM

## 2024-07-02 DIAGNOSIS — S86.311A PERONEAL TENDON TEAR, RIGHT, INITIAL ENCOUNTER: Primary | ICD-10-CM

## 2024-07-02 PROCEDURE — 99204 OFFICE O/P NEW MOD 45 MIN: CPT | Performed by: ORTHOPAEDIC SURGERY

## 2024-07-02 NOTE — PATIENT INSTRUCTIONS
Today, We recommended a brace/prosthesis for you. [facility] certified pedorthotists make orthotics but not braces or prosthesis.  Below are the companies in the area that make these, Please call ahead for an appointment and to ensure the company accepts your insurance. Make sure to bring the prescription given to you in the office today to the company for the brace/prosthesis.  [facility]    [place] Location  [address]  [place] [address]  [phone] (fax)    [facility]  [address], [address]  [place] [address]  [phone] (fax)    [place] Location  [address]  [address]  [place] [address] [phone] (fax)    [facility] Location  [address], [address]  [place] [address]  [phone] (fax)    [place] Location  [address], [address]  [place] [address]  [phone] (fax)

## 2024-07-02 NOTE — PROGRESS NOTES
James R Lachman, M.D.  Attending, Orthopaedic Surgery  Foot and Ankle  [facility]        ORTHOPAEDIC FOOT AND ANKLE CLINIC VISIT     Assessment:     Encounter Diagnoses   Name Primary?    Peroneal tendon tear, right, initial encounter Yes    Cavovarus deformity of foot, acquired, right           Plan:   The patient verbalized understanding of exam findings and treatment plan. We engaged in the shared decision-making process and treatment options were discussed at length with the patient. Surgical and conservative management discussed today along with risks and benefits.  With peroneus longus tendon rupture secondary to cavovarus foot deformity.   We recommend custom cavovarus orthotic, order placed today.  For the longus rupture, PT  If he were to require surgery, this would include Malerba and Dorsiflexion 1st ray ostetomy  Return in about 7 weeks (around 8/20/2024).      History of Present Illness:   Chief Complaint:   Chief Complaint   Patient presents with    Right Ankle - Pain     Tear of the tendon.      Filipe Hill is a 59 y.o. male who is being seen for right foot pain.  Pain started on June 9 while running with a sudden pop and onset of pain.  Patient was able to bear weight afterwards, but states attempting to put cleats back on later in the week severely aggravated his pain.  He has tried Advil without improvement of his pain.  He states that his pain has gradually reduced since onset..  Pain is localized at lateral midfoot.  With minimal radiating and described as sharp and severe. Patient denies numbness, tingling or radicular pain.  Denies history of neuropathy.  Patient does not smoke, does not have diabetes and does not take blood thinners.  Patient denies family history of anesthesia complications and has not had any complications with anesthesia.     Pain/symptom timing:  Worse during the day when active  Pain/symptom context:  Worse with activites and  "work  Pain/symptom modifying factors:  Rest makes better, activities make worse  Pain/symptom associated signs/symptoms: none    Prior treatment   NSAIDsYes    Injections No   Bracing/Orthotics No   Physical Therapy No     Orthopedic Surgical History:   None    Past Medical, Surgical and Social History:  Past Medical History:  has a past medical history of Asthma, Diverticulitis, Diverticulitis of colon (12-20-13), and Pneumonia.  Problem List: does not have any pertinent problems on file.  Past Surgical History:  has a past surgical history that includes Shoulder surgery; Bowel resection; Ankle surgery; Knee surgery; Tonsillectomy; [place] tooth extraction; and Colon surgery.  Family History: family history includes Breast cancer in his mother and sister; Cancer in his mother.  Social History:  reports that he has never smoked. He has never used smokeless tobacco. He reports current alcohol use of about 3.0 standard drinks of alcohol per week. He reports that he does not use drugs.  Current Medications: has a current medication list which includes the following prescription(s): acai berry, albuterol, albuterol, vitamin c, aspirin, b complex-c-e-zinc, b-complex-c, vitamin d3, co-enzyme q-10, multivitamin with minerals, fish oil, probiotic, symbicort, turmeric, azithromycin, hydroxyzine hcl, prednisone, and quercetin.  Allergies: is allergic to methylprednisolone and bee venom.     Review of Systems:  General- denies fever/chills  HEENT- denies hearing loss or sore throat  Eyes- denies eye pain or visual disturbances, denies red eyes  Respiratory- denies cough or SOB  Cardio- denies chest pain or palpitations  GI- denies abdominal pain  Endocrine- denies urinary frequency  Urinary- denies pain with urination  Musculoskeletal- Negative except noted above  Skin- denies rashes or wounds  Neurological- denies dizziness or headache  Psychiatric- denies anxiety or difficulty concentrating    Physical Exam:   Ht 6' 3\" (1.905 " m)   Wt 110 kg (241 lb 12.8 oz)   BMI 30.22 kg/m²   General/Constitutional: No apparent distress: well-nourished and well developed.  Eyes: normal ocular motion  Cardio: RRR, Normal S1S2, No m/r/g  Lymphatic: No appreciable lymphadenopathy  Respiratory: Non-labored breathing, CTA b/l no w/c/r  Vascular: No edema, swelling or tenderness, except as noted in detailed exam.  Integumentary: No impressive skin lesions present, except as noted in detailed exam.  Neuro: No ataxia or tremors noted  Psych: Normal mood and affect, oriented to person, place and time. Appropriate affect.  Musculoskeletal: Normal, except as noted in detailed exam and in HPI.    Examination    Right    Gait Normal and Antalgic   Musculoskeletal Tender to palpation at plantar mid-foot    Skin Normal.      Nails Normal    Range of Motion  20 degrees dorsiflexion, 45 degrees plantarflexion  Subtalar motion: full    Stability Stable    Muscle Strength 5/5 tibialis anterior  5/5 gastrocnemius-soleus  5/5 posterior tibialis  5/5 peroneal/eversion strength  5/5 EHL  5/5 FHL    Neurologic Normal    Sensation  Intact to light touch throughout sural, saphenous, superficial peroneal, deep peroneal and medial/lateral plantar nerve distributions.  Robards-Angel 5.07 filament (10g) testing deferred.    Cardiovascular Brisk capillary refill < 2 seconds,intact DP and PT pulses    Special Tests None      Imaging Studies:   MRI of the right foot demonstrates peroneus longus tendon rupture.  Reviewed by me personally.        James R. Lachman, MD  Foot & Ankle Surgery   Department of Orthopaedic Surgery  [facility]      I personally performed the service.    James R. Lachman, MD

## 2024-07-02 NOTE — TELEPHONE ENCOUNTER
Caller: Radha    Doctor/Office: Lachman/ Anderson    CB#: [phone]      What needs to be faxed: script for Orthotics    ATTN to: [facility]    Fax#: [phone]

## 2024-07-02 NOTE — TELEPHONE ENCOUNTER
Caller: patient    Doctor: Lachman    Reason for call: please enter PT script for R ankle    Patient cannot locate the DME/orthotic script under Other Orders or To Do in his Mychart.    Call back#: [phone]

## 2024-07-03 ENCOUNTER — TELEPHONE (OUTPATIENT)
Age: 59
End: 2024-07-03

## 2024-07-03 NOTE — TELEPHONE ENCOUNTER
Caller: Patient    Doctor: ortho    Reason for call:     Requesting a particular dr but the doctor does not see for foot, recommended podiatry, but already went thru this department.    Call back#: n/a

## 2024-07-05 DIAGNOSIS — S86.311A PERONEAL TENDON TEAR, RIGHT, INITIAL ENCOUNTER: Primary | ICD-10-CM

## 2024-07-08 ENCOUNTER — OFFICE VISIT (OUTPATIENT)
Dept: PHYSICAL THERAPY | Facility: MEDICAL CENTER | Age: 59
End: 2024-07-08
Payer: COMMERCIAL

## 2024-07-08 DIAGNOSIS — M79.671 RIGHT FOOT PAIN: Primary | ICD-10-CM

## 2024-07-08 PROCEDURE — 97110 THERAPEUTIC EXERCISES: CPT | Performed by: PHYSICAL THERAPIST

## 2024-07-08 PROCEDURE — 97140 MANUAL THERAPY 1/> REGIONS: CPT | Performed by: PHYSICAL THERAPIST

## 2024-07-08 PROCEDURE — 97112 NEUROMUSCULAR REEDUCATION: CPT | Performed by: PHYSICAL THERAPIST

## 2024-07-08 NOTE — PROGRESS NOTES
"Daily Note     Today's date: 2024  Patient name: Filipe Hill  : 1965  MRN: [id number]  Referring provider: Esequiel Blair MD  Dx:   Encounter Diagnosis     ICD-10-CM    1. Right foot pain  M79.671                      Subjective: Pt reports that he went to see the surgeon, who confirmed he has a torn peroneus longus tendon, but he is not a surgical candidate.       Objective: See treatment diary below      Assessment: Tolerated treatment well. Patient demonstrated fatigue post treatment, exhibited good technique with therapeutic exercises, and would benefit from continued PT      Plan: Continue per plan of care.      Precautions: None      Manuals 5/9 5/13 5/20 5/23 5/28 5/30 6/6 6/10 6/13 6/20 7/8   TC jt mobs HK HK HK  HK HK HK HK HK HK HK   Mid foot mobs        HK HK HK HK   Sub talar mobs        HK HK HK HK                 Ther Ex  6/6 6/10 6/13 6/20 7/8   Bike NV 15' 10' 10' 10' 10 10' NP 10 10' NP   Ankle mobility  Y  x30 Y  30 Y  x30 Y  x30 Y x30 Y  x30 Y  x30 NP Y  x30 Y  x30 Y  x30   Slant str 3x30\" 3x30\" 3x30\" 3x30\" 3x30\" 3x30\" 3x30\" NP 3x30\" 3x30\" NP   Stand DF 3x10 3x30 3x30 3x30\" 3x30 3x30 3x30 NP 3x30 3x30  IV/EV 3x30  reg   Step downs NV L2  3x10 L2  x30 L2  x30 L2 x30 L3  x30 L3  x30 NP L2  x30 L2  x30 L2  x30   SLS  NV NV NV NV 30\"x3 3x  30\" 3x30\" NP NP NP NP   T-band IV NV  Red NV NV Red  3x10 Red 3x10 Green  3x15 Green  3x15 NP Blue  3x15 Blue  3x15 Red  X30  IV and EV   BAPS      L2  x30 L2  x30 NP NP NP NP                                                                         MH    15'   15' 15' 15' 15'                                            " I spoke to pt who was seen in the [facility] 1/19/20. She stated she is starting to improve and is taking all medications as prescribed. She has f/u with Dr. Valenzuela tomorrow.

## 2024-07-11 ENCOUNTER — OFFICE VISIT (OUTPATIENT)
Dept: PHYSICAL THERAPY | Facility: MEDICAL CENTER | Age: 59
End: 2024-07-11
Payer: COMMERCIAL

## 2024-07-11 DIAGNOSIS — M79.671 RIGHT FOOT PAIN: Primary | ICD-10-CM

## 2024-07-11 PROCEDURE — 97110 THERAPEUTIC EXERCISES: CPT | Performed by: PHYSICAL THERAPIST

## 2024-07-11 PROCEDURE — 97112 NEUROMUSCULAR REEDUCATION: CPT | Performed by: PHYSICAL THERAPIST

## 2024-07-11 PROCEDURE — 97140 MANUAL THERAPY 1/> REGIONS: CPT | Performed by: PHYSICAL THERAPIST

## 2024-07-11 NOTE — PROGRESS NOTES
"Daily Note     Today's date: 2024  Patient name: Filipe Hill  : 1965  MRN: [id number]  Referring provider: Esequiel Blair MD  Dx:   Encounter Diagnosis     ICD-10-CM    1. Right foot pain  M79.671                      Subjective: Pt reports some improvement in function since last visit.        Objective: See treatment diary below      Assessment: Tolerated treatment well. Patient demonstrated fatigue post treatment, exhibited good technique with therapeutic exercises, and would benefit from continued PT      Plan: Continue per plan of care.      Precautions: None      Manuals              TC jt mobs HK             Mid foot mobs HK             Sub talar mobs HK                           Ther Ex              Bike 10'             Ankle mobility  Y  x30             Slant str 3x30\"             Stand DF 3x30             Step downs X30  L1             SLS  NP             T-band IV 3x15  Red             BAPS NP                                                                                   MH NP                                                       "

## 2024-07-16 ENCOUNTER — OFFICE VISIT (OUTPATIENT)
Dept: PHYSICAL THERAPY | Facility: MEDICAL CENTER | Age: 59
End: 2024-07-16
Payer: COMMERCIAL

## 2024-07-16 DIAGNOSIS — M79.671 RIGHT FOOT PAIN: Primary | ICD-10-CM

## 2024-07-16 PROCEDURE — 97110 THERAPEUTIC EXERCISES: CPT

## 2024-07-16 PROCEDURE — 97140 MANUAL THERAPY 1/> REGIONS: CPT

## 2024-07-16 PROCEDURE — 97112 NEUROMUSCULAR REEDUCATION: CPT

## 2024-07-16 NOTE — PROGRESS NOTES
"Daily Note     Today's date: 2024  Patient name: Filipe Hill  : 1965  MRN: [id number]  Referring provider: Esequiel Blair MD  Dx:   Encounter Diagnosis     ICD-10-CM    1. Right foot pain  M79.671                      Subjective: Pt reports that he was able to go for a 3 mile walk the other day with just weakness noted.  Pt states that pushing off or any kind of lateral movements cause a great deal of pain.  Pt also feels as though his knees are experiencing some discomfort due to compensatory patterns.       Objective: See treatment diary below      Assessment: Tolerated treatment well. Patient exhibited good technique with therapeutic exercises and would benefit from continued PT        Plan: Continue per plan of care.      Precautions: None      Manuals             TC jt mobs HK PROM  KO            Mid foot mobs HK             Sub talar mobs HK                           Ther Ex             Bike 10' 10'            Ankle mobility  Y  x30 Y  x30            Slant str 3x30\" NP            Stand DF 3x30 3x30            Step downs X30  L1 X30  L2            SLS  NP NP            T-band IV 3x15  Red Green  3x10            BAPS NP NP                                                                                  MH NP NP                                                        "

## 2024-07-18 ENCOUNTER — OFFICE VISIT (OUTPATIENT)
Dept: PHYSICAL THERAPY | Facility: MEDICAL CENTER | Age: 59
End: 2024-07-18
Payer: COMMERCIAL

## 2024-07-18 DIAGNOSIS — M79.671 RIGHT FOOT PAIN: Primary | ICD-10-CM

## 2024-07-18 PROCEDURE — 97110 THERAPEUTIC EXERCISES: CPT | Performed by: PHYSICAL THERAPIST

## 2024-07-18 PROCEDURE — 97112 NEUROMUSCULAR REEDUCATION: CPT | Performed by: PHYSICAL THERAPIST

## 2024-07-18 PROCEDURE — 97140 MANUAL THERAPY 1/> REGIONS: CPT | Performed by: PHYSICAL THERAPIST

## 2024-07-18 NOTE — PROGRESS NOTES
"Daily Note     Today's date: 2024  Patient name: Filipe Hill  : 1965  MRN: [id number]  Referring provider: Esequiel Blair MD  Dx:   Encounter Diagnosis     ICD-10-CM    1. Right foot pain  M79.671                      Subjective: Pt reports that the bottom of his foot is very sore today.  He mowed his lawn yesterday and that may be why his foot is sore.        Objective: See treatment diary below    I suggested to the patient that he may want to mow the lawn in a stiffer shoe than his sneakers.      Assessment: Tolerated treatment well. Patient demonstrated fatigue post treatment, exhibited good technique with therapeutic exercises, and would benefit from continued PT      Plan: Continue per plan of care.      Precautions: None      Manuals            TC jt mobs HK PROM  KO HK           Mid foot mobs HK  HK           Sub talar mobs HK  HK                         Ther Ex            Bike 10' 10' 10'           Ankle mobility  Y  x30 Y  x30 Y  x30           Slant str 3x30\" NP NP           Stand DF 3x30 3x30 3x30           Step downs X30  L1 X30  L2 X30  L2           SLS  NP NP NP           T-band IV 3x15  Red Green  3x10 Green  3x10           BAPS NP NP NP                                                                                 MH NP NP                                                          "

## 2024-07-22 ENCOUNTER — OFFICE VISIT (OUTPATIENT)
Dept: PHYSICAL THERAPY | Facility: MEDICAL CENTER | Age: 59
End: 2024-07-22
Payer: COMMERCIAL

## 2024-07-22 DIAGNOSIS — M79.671 RIGHT FOOT PAIN: Primary | ICD-10-CM

## 2024-07-22 PROCEDURE — 97110 THERAPEUTIC EXERCISES: CPT | Performed by: PHYSICAL THERAPIST

## 2024-07-22 PROCEDURE — 97140 MANUAL THERAPY 1/> REGIONS: CPT | Performed by: PHYSICAL THERAPIST

## 2024-07-22 PROCEDURE — 97112 NEUROMUSCULAR REEDUCATION: CPT | Performed by: PHYSICAL THERAPIST

## 2024-07-22 NOTE — PROGRESS NOTES
"Daily Note     Today's date: 2024  Patient name: Filipe Hill  : 1965  MRN: [id number]  Referring provider: Esequiel Blair MD  Dx:   Encounter Diagnosis     ICD-10-CM    1. Right foot pain  M79.671                      Subjective: Pt reports that he had a lot of burning in his foot this weekend, but also did a lot more sitting around than usual due to the issue with flying.        Objective: See treatment diary below      Assessment: Tolerated treatment well. Patient demonstrated fatigue post treatment, exhibited good technique with therapeutic exercises, and would benefit from continued PT      Plan: Continue per plan of care.      Precautions: None      Manuals           TC jt mobs HK PROM  KO HK HK          Mid foot mobs HK  HK HK          Sub talar mobs HK  HK HK                        Ther Ex           Bike 10' 10' 10' 10'          Ankle mobility  Y  x30 Y  x30 Y  x30 Y  x30          Slant str 3x30\" NP NP 3x30\"          Stand DF 3x30 3x30 3x30 3x30          Step downs X30  L1 X30  L2 X30  L2 X30  L2          SLS  NP NP NP NP          T-band IV 3x15  Red Green  3x10 Green  3x10 Green  3x15          BAPS NP NP NP                                                                                 MH NP NP 15' 15'                                                          "

## 2024-07-25 ENCOUNTER — OFFICE VISIT (OUTPATIENT)
Dept: PHYSICAL THERAPY | Facility: MEDICAL CENTER | Age: 59
End: 2024-07-25
Payer: COMMERCIAL

## 2024-07-25 DIAGNOSIS — M79.671 RIGHT FOOT PAIN: Primary | ICD-10-CM

## 2024-07-25 PROCEDURE — 97112 NEUROMUSCULAR REEDUCATION: CPT | Performed by: PHYSICAL THERAPIST

## 2024-07-25 PROCEDURE — 97140 MANUAL THERAPY 1/> REGIONS: CPT | Performed by: PHYSICAL THERAPIST

## 2024-07-25 PROCEDURE — 97110 THERAPEUTIC EXERCISES: CPT | Performed by: PHYSICAL THERAPIST

## 2024-07-25 NOTE — PROGRESS NOTES
"Daily Note     Today's date: 2024  Patient name: Filipe Hill  : 1965  MRN: [id number]  Referring provider: Esequiel Blair MD  Dx:   Encounter Diagnosis     ICD-10-CM    1. Right foot pain  M79.671                      Subjective: Pt reports that his foot was really sore for 2 days after his last visit, but he is feeling pretty good today.        Objective: See treatment diary below      Assessment: Tolerated treatment well. Patient demonstrated fatigue post treatment, exhibited good technique with therapeutic exercises, and would benefit from continued PT      Plan: Continue per plan of care.      Precautions: None      Manuals          TC jt mobs HK PROM  KO HK HK HK         Mid foot mobs HK  HK HK HK         Sub talar mobs HK  HK HK HK                       Ther Ex          Bike 10' 10' 10' 10' 10'         Ankle mobility  Y  x30 Y  x30 Y  x30 Y  x30 Y  x30         Slant str 3x30\" NP NP 3x30\" 3x30\"         Stand DF 3x30 3x30 3x30 3x30 3x30         Step downs X30  L1 X30  L2 X30  L2 X30  L2 X30  L2         SLS  NP NP NP NP NP         T-band IV 3x15  Red Green  3x10 Green  3x10 Green  3x15 Green  3x15         BAPS NP NP NP                                                                                 MH NP NP 15' 15'                                                            "

## 2024-07-29 ENCOUNTER — OFFICE VISIT (OUTPATIENT)
Dept: PHYSICAL THERAPY | Facility: MEDICAL CENTER | Age: 59
End: 2024-07-29
Payer: COMMERCIAL

## 2024-07-29 DIAGNOSIS — M79.671 RIGHT FOOT PAIN: Primary | ICD-10-CM

## 2024-07-29 PROCEDURE — 97140 MANUAL THERAPY 1/> REGIONS: CPT | Performed by: PHYSICAL THERAPIST

## 2024-07-29 PROCEDURE — 97112 NEUROMUSCULAR REEDUCATION: CPT | Performed by: PHYSICAL THERAPIST

## 2024-07-29 PROCEDURE — 97110 THERAPEUTIC EXERCISES: CPT | Performed by: PHYSICAL THERAPIST

## 2024-07-29 NOTE — PROGRESS NOTES
"Daily Note     Today's date: 2024  Patient name: Filipe Hill  : 1965  MRN: [id number]  Referring provider: Esequiel Blair MD  Dx:   Encounter Diagnosis     ICD-10-CM    1. Right foot pain  M79.671                      Subjective: Pt reports that he went to see Dr. Mendoza who told him that he wasn't a surgical candidate.  He suggested relative rest.        Objective: See treatment diary below      Assessment: Tolerated treatment well. Patient would benefit from continued PT      Plan: Continue per plan of care.      Precautions: None      Manuals          TC jt mobs HK PROM  KO HK HK HK         Mid foot mobs HK  HK HK HK         Sub talar mobs HK  HK HK HK                       Ther Ex          Bike 10' 10' 10' 10' 10'         Ankle mobility  Y  x30 Y  x30 Y  x30 Y  x30 Y  x30         Slant str 3x30\" NP NP 3x30\" 3x30\"         Stand DF 3x30 3x30 3x30 3x30 3x30         Step downs X30  L1 X30  L2 X30  L2 X30  L2 X30  L2         SLS  NP NP NP NP NP         T-band IV 3x15  Red Green  3x10 Green  3x10 Green  3x15 Green  3x15         BAPS NP NP NP                                                                                 MH NP NP 15' 15' 15'                                                             "

## 2024-08-01 ENCOUNTER — OFFICE VISIT (OUTPATIENT)
Dept: PHYSICAL THERAPY | Facility: MEDICAL CENTER | Age: 59
End: 2024-08-01
Payer: COMMERCIAL

## 2024-08-01 DIAGNOSIS — M79.671 RIGHT FOOT PAIN: Primary | ICD-10-CM

## 2024-08-01 PROCEDURE — 97110 THERAPEUTIC EXERCISES: CPT

## 2024-08-01 PROCEDURE — 97140 MANUAL THERAPY 1/> REGIONS: CPT

## 2024-08-01 PROCEDURE — 97112 NEUROMUSCULAR REEDUCATION: CPT

## 2024-08-01 NOTE — PROGRESS NOTES
"Daily Note     Today's date: 2024  Patient name: Filipe Hill  : 1965  MRN: [id number]  Referring provider: Esequiel Blair MD  Dx:   Encounter Diagnosis     ICD-10-CM    1. Right foot pain  M79.671                      Subjective: Pt reports that his R foot is feeling sore today noting that he has been trying to stay less active per MD orders. He has been painting at a friends house but has not been climbing ladders.       Objective: See treatment diary below      Assessment: Tolerated treatment well. Held ankle INV per PT. He was able to perform all exercises this session with no complaints. Pt fel looser post manuals. Patient would benefit from continued PT      Plan: Continue per plan of care.      Precautions: None      Manuals         TC jt mobs HK PROM  KO HK HK HK HK        Mid foot mobs HK  HK HK HK HK        Sub talar mobs HK  HK HK HK HK        PROM      MM        Ther Ex         Bike 10' 10' 10' 10' 10' 10'        Ankle mobility  Y  x30 Y  x30 Y  x30 Y  x30 Y  x30 Y 30x        Slant str 3x30\" NP NP 3x30\" 3x30\" 3x30\"        Stand DF 3x30 3x30 3x30 3x30 3x30 3x30\"        Step downs X30  L1 X30  L2 X30  L2 X30  L2 X30  L2 X30 L2        SLS  NP NP NP NP NP NP        T-band IV 3x15  Red Green  3x10 Green  3x10 Green  3x15 Green  3x15 NP        BAPS NP NP NP                                                                                 MH NP NP 15' 15' 15' 15'                                            "

## 2024-08-05 ENCOUNTER — OFFICE VISIT (OUTPATIENT)
Dept: PHYSICAL THERAPY | Facility: MEDICAL CENTER | Age: 59
End: 2024-08-05
Payer: COMMERCIAL

## 2024-08-05 ENCOUNTER — OFFICE VISIT (OUTPATIENT)
Dept: FAMILY MEDICINE CLINIC | Facility: CLINIC | Age: 59
End: 2024-08-05

## 2024-08-05 VITALS
HEIGHT: 75 IN | SYSTOLIC BLOOD PRESSURE: 122 MMHG | HEART RATE: 72 BPM | WEIGHT: 241 LBS | DIASTOLIC BLOOD PRESSURE: 84 MMHG | BODY MASS INDEX: 29.97 KG/M2

## 2024-08-05 DIAGNOSIS — Z02.89 ENCOUNTER FOR FEDERAL AVIATION ADMINISTRATION (FAA) EXAMINATION: Primary | ICD-10-CM

## 2024-08-05 DIAGNOSIS — M79.671 RIGHT FOOT PAIN: Primary | ICD-10-CM

## 2024-08-05 PROCEDURE — 97140 MANUAL THERAPY 1/> REGIONS: CPT

## 2024-08-05 PROCEDURE — 97110 THERAPEUTIC EXERCISES: CPT

## 2024-08-05 PROCEDURE — 97112 NEUROMUSCULAR REEDUCATION: CPT

## 2024-08-05 PROCEDURE — 99499 UNLISTED E&M SERVICE: CPT | Performed by: FAMILY MEDICINE

## 2024-08-05 NOTE — PROGRESS NOTES
Chief Complaint   Patient presents with   • Physical Exam     FAA 1st class. No meds, correctives

## 2024-08-05 NOTE — PROGRESS NOTES
"Daily Note     Today's date: 2024  Patient name: Filipe Hill  : 1965  MRN: [id number]  Referring provider: Esequiel Blair MD  Dx:   Encounter Diagnosis     ICD-10-CM    1. Right foot pain  M79.671                      Subjective: Pt reports that he was walking on uneven surfaces the other night and felt quite sore, so he rested it yesterday.        Objective: See treatment diary below      Assessment: Tolerated treatment well. Patient demonstrated fatigue post treatment, exhibited good technique with therapeutic exercises, and would benefit from continued PT      Plan: Continue per plan of care.      Precautions: None      Manuals        TC jt mobs HK PROM  KO HK HK HK HK PROM  KO       Mid foot mobs HK  HK HK HK HK        Sub talar mobs HK  HK HK HK HK        PROM      MM        Ther Ex        Bike 10' 10' 10' 10' 10' 10' 10'       Ankle mobility  Y  x30 Y  x30 Y  x30 Y  x30 Y  x30 Y 30x Y  x30       Slant str 3x30\" NP NP 3x30\" 3x30\" 3x30\" 3x30\"       Stand DF 3x30 3x30 3x30 3x30 3x30 3x30\" 3x30\"       Step downs X30  L1 X30  L2 X30  L2 X30  L2 X30  L2 X30 L2 X30  L2       SLS  NP NP NP NP NP NP NP       T-band IV 3x15  Red Green  3x10 Green  3x10 Green  3x15 Green  3x15 NP NP       BAPS NP NP NP                                                                                 MH NP NP 15' 15' 15' 15' NP                                             "

## 2024-08-09 ENCOUNTER — OFFICE VISIT (OUTPATIENT)
Dept: PHYSICAL THERAPY | Facility: MEDICAL CENTER | Age: 59
End: 2024-08-09
Payer: COMMERCIAL

## 2024-08-09 DIAGNOSIS — M79.671 RIGHT FOOT PAIN: Primary | ICD-10-CM

## 2024-08-09 PROCEDURE — 97110 THERAPEUTIC EXERCISES: CPT | Performed by: PHYSICAL THERAPIST

## 2024-08-09 PROCEDURE — 97140 MANUAL THERAPY 1/> REGIONS: CPT | Performed by: PHYSICAL THERAPIST

## 2024-08-09 PROCEDURE — 97112 NEUROMUSCULAR REEDUCATION: CPT | Performed by: PHYSICAL THERAPIST

## 2024-08-09 NOTE — PROGRESS NOTES
"Daily Note     Today's date: 2024  Patient name: Filipe Hill  : 1965  MRN: [id number]  Referring provider: Esequiel Blair MD  Dx:   Encounter Diagnosis     ICD-10-CM    1. Right foot pain  M79.671                      Subjective: Pt reports that he was active than usual in the past few days and he did not have ay significant pain.  Pt noticed some fatigue in his lower leg and foot.        Objective: See treatment diary below      Assessment: Tolerated treatment well. Patient demonstrated fatigue post treatment, exhibited good technique with therapeutic exercises, and would benefit from continued PT      Plan: Continue per plan of care.      Precautions: None      Manuals       TC jt mobs HK PROM  KO HK HK HK HK PROM  KO HK      Mid foot mobs HK  HK HK HK HK  HK      Sub talar mobs HK  HK HK HK HK  HK      PROM      MM  HK      Ther Ex       Bike 10' 10' 10' 10' 10' 10' 10' 10'      Ankle mobility  Y  x30 Y  x30 Y  x30 Y  x30 Y  x30 Y 30x Y  x30 Y  x30      Slant str 3x30\" NP NP 3x30\" 3x30\" 3x30\" 3x30\" 3x30\"      Stand DF 3x30 3x30 3x30 3x30 3x30 3x30 3x30 3x30      Step downs X30  L1 X30  L2 X30  L2 X30  L2 X30  L2 X30 L2 X30  L2 X30  L2      SLS  NP NP NP NP NP NP NP NP      T-band IV 3x15  Red Green  3x10 Green  3x10 Green  3x15 Green  3x15 NP NP NP      BAPS NP NP NP                                                                                 MH NP NP 15' 15' 15' 15' NP                                               "

## 2024-08-12 ENCOUNTER — OFFICE VISIT (OUTPATIENT)
Dept: PHYSICAL THERAPY | Facility: MEDICAL CENTER | Age: 59
End: 2024-08-12
Payer: COMMERCIAL

## 2024-08-12 DIAGNOSIS — M79.671 RIGHT FOOT PAIN: Primary | ICD-10-CM

## 2024-08-12 PROCEDURE — 97140 MANUAL THERAPY 1/> REGIONS: CPT | Performed by: PHYSICAL THERAPIST

## 2024-08-12 PROCEDURE — 97112 NEUROMUSCULAR REEDUCATION: CPT | Performed by: PHYSICAL THERAPIST

## 2024-08-12 PROCEDURE — 97110 THERAPEUTIC EXERCISES: CPT | Performed by: PHYSICAL THERAPIST

## 2024-08-12 NOTE — PROGRESS NOTES
"Daily Note     Today's date: 2024  Patient name: Filipe Hill  : 1965  MRN: [id number]  Referring provider: Esequiel Blair MD  Dx:   Encounter Diagnosis     ICD-10-CM    1. Right foot pain  M79.671                      Subjective: Pt reports that his foot is a little sore, but notes overall improvement.        Objective: See treatment diary below      Assessment: Tolerated treatment well. Patient demonstrated fatigue post treatment, exhibited good technique with therapeutic exercises, and would benefit from continued PT      Plan: Continue per plan of care.      Precautions: None      Manuals      TC jt mobs HK PROM  KO HK HK HK HK PROM  KO HK HK     Mid foot mobs HK  HK HK HK HK  HK HK     Sub talar mobs HK  HK HK HK HK  HK HK     PROM      MM  HK HK     Ther Ex      Bike 10' 10' 10' 10' 10' 10' 10 10 10'     Ankle mobility  Y  x30 Y  x30 Y  x30 Y  x30 Y  x30 Y 30x Y  x30 Y  x30 Y  x30     Slant str 3x30\" NP NP 3x30\" 3x30\" 3x30\" 3x30\" 3x30\" 3x30\"     Stand DF 3x30 3x30 3x30 3x30 3x30 3x30 3x30 3x30 3x30     Step downs X30  L1 X30  L2 X30  L2 X30  L2 X30  L2 X30 L2 X30  L2 X30  L2 X30  L2     SLS  NP NP NP NP NP NP NP NP NP     T-band IV 3x15  Red Green  3x10 Green  3x10 Green  3x15 Green  3x15 NP NP NP NP     BAPS NP NP NP                                                                                 MH NP NP 15' 15' 15' 15' NP 15' 15'                                               "

## 2024-08-14 ENCOUNTER — OFFICE VISIT (OUTPATIENT)
Dept: PHYSICAL THERAPY | Facility: MEDICAL CENTER | Age: 59
End: 2024-08-14
Payer: COMMERCIAL

## 2024-08-14 DIAGNOSIS — M79.671 RIGHT FOOT PAIN: Primary | ICD-10-CM

## 2024-08-14 PROCEDURE — 97140 MANUAL THERAPY 1/> REGIONS: CPT

## 2024-08-14 PROCEDURE — 97110 THERAPEUTIC EXERCISES: CPT

## 2024-08-14 PROCEDURE — 97112 NEUROMUSCULAR REEDUCATION: CPT

## 2024-08-14 NOTE — PROGRESS NOTES
"Daily Note     Today's date: 2024  Patient name: Filipe Hill  : 1965  MRN: [id number]  Referring provider: Esequiel Blair MD  Dx:   Encounter Diagnosis     ICD-10-CM    1. Right foot pain  M79.671                      Subjective: Pt reports that his ankle is sore today and believes it is from cutting the grass and going up and down the ladder to clean a small portion of his gutters out.  Pt however, notes that his ankle mobility is improving.       Objective: See treatment diary below      Assessment: Tolerated treatment well. Patient demonstrated fatigue post treatment, exhibited good technique with therapeutic exercises, and would benefit from continued PT  Pt is making progress in his ankle mobility and is making incremental progress.       Plan: Continue per plan of care.      Precautions: None      Manuals     TC jt mobs HK PROM  KO HK HK HK HK PROM  KO HK HK     Mid foot mobs HK  HK HK HK HK  HK HK     Sub talar mobs HK  HK HK HK HK  HK HK     PROM      MM  HK HK KO    Ther Ex     Bike 10' 10' 10' 10' 10' 10' 10' 10' 10 10'    Ankle mobility  Y  x30 Y  x30 Y  x30 Y  x30 Y  x30 Y 30x Y  x30 Y  x30 Y  x30 Y  x30    Slant str 3x30\" NP NP 3x30\" 3x30\" 3x30\" 3x30\" 3x30\" 3x30\" 3x30\"    Stand DF 3x30 3x30 3x30 3x30 3x30 3x30 3x30 3x30 3x30 3x30    Step downs X30  L1 X30  L2 X30  L2 X30  L2 X30  L2 X30 L2 X30  L2 X30  L2 X30  L2 X30  L2    SLS  NP NP NP NP NP NP NP NP NP NP    T-band IV 3x15  Red Green  3x10 Green  3x10 Green  3x15 Green  3x15 NP NP NP NP NP    BAPS NP NP NP                                                                                 MH NP NP 15' 15' 15' 15' NP 15' 15' 10'                                                "

## 2024-08-22 ENCOUNTER — OFFICE VISIT (OUTPATIENT)
Dept: PHYSICAL THERAPY | Facility: MEDICAL CENTER | Age: 59
End: 2024-08-22
Payer: COMMERCIAL

## 2024-08-22 DIAGNOSIS — M79.671 RIGHT FOOT PAIN: Primary | ICD-10-CM

## 2024-08-22 PROCEDURE — 97140 MANUAL THERAPY 1/> REGIONS: CPT

## 2024-08-22 PROCEDURE — 97110 THERAPEUTIC EXERCISES: CPT

## 2024-08-22 PROCEDURE — 97112 NEUROMUSCULAR REEDUCATION: CPT

## 2024-08-22 NOTE — PROGRESS NOTES
"Daily Note     Today's date: 2024  Patient name: Filipe Hill  : 1965  MRN: [id number]  Referring provider: Esequiel Blair MD  Dx:   Encounter Diagnosis     ICD-10-CM    1. Right foot pain  M79.671                      Subjective: Pt reports that he experienced a significant amount of pain in his foot after sitting for a prolonged period of time while in meetings yesterday.  Pt states that he hasn't been in this much pain since the initial injury.        Objective: See treatment diary below      Assessment: Tolerated treatment well. Patient demonstrated fatigue post treatment, exhibited good technique with therapeutic exercises, and would benefit from continued PT  Initial ankle stiffness w/PROM which decreased after a while. Pt will be away on vacation the next 2+ weeks and will resume PT upon return.       Plan: Continue per plan of care.      Precautions: None      Manuals    TC jt mobs HK PROM  KO HK HK HK HK PROM  KO HK HK     Mid foot mobs HK  HK HK HK HK  HK HK     Sub talar mobs HK  HK HK HK HK  HK HK     PROM      MM  HK HK KO KO   Ther Ex    Bike 10' 10' 10' 10' 10' 10' 10' 10' 10' 10 10'   Ankle mobility  Y  x30 Y  x30 Y  x30 Y  x30 Y  x30 Y 30x Y  x30 Y  x30 Y  x30 Y  x30 Y  x30   Slant str 3x30\" NP NP 3x30\" 3x30\" 3x30\" 3x30\" 3x30\" 3x30\" 3x30\" 3x30\"   Stand DF 3x30 3x30 3x30 3x30 3x30 3x30 3x30 3x30 3x30 3x30 3x30   Step downs X30  L1 X30  L2 X30  L2 X30  L2 X30  L2 X30 L2 X30  L2 X30  L2 X30  L2 X30  L2 3x15  L2   SLS  NP NP NP NP NP NP NP NP NP NP NP   T-band IV 3x15  Red Green  3x10 Green  3x10 Green  3x15 Green  3x15 NP NP NP NP NP NP   BAPS NP NP NP                                                                                 MH NP NP 15' 15' 15' 15' NP 15' 15' 10' 10'                                                 "

## 2024-08-22 NOTE — PROGRESS NOTES
"Daily Note     Today's date: 2024  Patient name: Filipe Hill  : 1965  MRN: [id number]  Referring provider: Esequiel Blair MD  Dx:   Encounter Diagnosis     ICD-10-CM    1. Right foot pain  M79.671                      Subjective: Pt reports that he experienced quite a bit of pain yesterday and feels as though it's the most pain he's felt since the incident.  Pt states that he was sitting for a prolonged period of time in meetings yesterday and  the pain incre      Objective: See treatment diary below      Assessment: Tolerated treatment {Tolerated treatment :3031217342}. Patient {assessment:5152799094}      Plan: {PLAN:7529927119}     Precautions: None      Manuals    TC jt mobs HK PROM  KO HK HK HK HK PROM  KO HK HK     Mid foot mobs HK  HK HK HK HK  HK HK     Sub talar mobs HK  HK HK HK HK  HK HK     PROM      MM  HK HK KO KO   Ther Ex    Bike 10' 10' 10' 10' 10' 10' 10' 10' 10' 10' 10   Ankle mobility  Y  x30 Y  x30 Y  x30 Y  x30 Y  x30 Y 30x Y  x30 Y  x30 Y  x30 Y  x30 Y  x30   Slant str 3x30\" NP NP 3x30\" 3x30\" 3x30\" 3x30\" 3x30\" 3x30\" 3x30\" 3x30\"   Stand DF 3x30 3x30 3x30 3x30 3x30 3x30 3x30 3x30 3x30 3x30 3x30   Step downs X30  L1 X30  L2 X30  L2 X30  L2 X30  L2 X30 L2 X30  L2 X30  L2 X30  L2 X30  L2 3x15  L2   SLS  NP NP NP NP NP NP NP NP NP NP NP   T-band IV 3x15  Red Green  3x10 Green  3x10 Green  3x15 Green  3x15 NP NP NP NP NP NP   BAPS NP NP NP                                                                                 MH NP NP 15' 15' 15' 15' NP 15' 15' 10'                                                Daily Note     Today's date: 2024  Patient name: Filipe Hill  : 1965  MRN: [id number]  Referring provider: Esequiel Blair MD  Dx:   Encounter Diagnosis     ICD-10-CM    1. Right foot pain  M79.671                      Subjective: ***      Objective: See treatment diary " "below      Assessment: Tolerated treatment {Tolerated treatment :6636048076}. Patient {assessment:}      Plan: {PLAN:}     Precautions: None      Manuals /    TC jt mobs HK PROM  KO HK HK HK HK PROM  KO HK HK     Mid foot mobs HK  HK HK HK HK  HK HK     Sub talar mobs HK  HK HK HK HK  HK HK     PROM      MM  HK HK KO    Ther Ex     Bike 10' 10' 10' 10' 10' 10' 10' 10' 10' 10'    Ankle mobility  Y  x30 Y  x30 Y  x30 Y  x30 Y  x30 Y 30x Y  x30 Y  x30 Y  x30 Y  x30    Slant str 3x30\" NP NP 3x30\" 3x30\" 3x30\" 3x30\" 3x30\" 3x30\" 3x30\"    Stand DF 3x30 3x30 3x30 3x30 3x30 3x30 3x30 3x30 3x30 3x30    Step downs X30  L1 X30  L2 X30  L2 X30  L2 X30  L2 X30 L2 X30  L2 X30  L2 X30  L2 X30  L2    SLS  NP NP NP NP NP NP NP NP NP NP    T-band IV 3x15  Red Green  3x10 Green  3x10 Green  3x15 Green  3x15 NP NP NP NP NP    BAPS NP NP NP                                                                                 MH NP NP 15' 15' 15' 15' NP 15' 15' 10'                                                Daily Note     Today's date: 2024  Patient name: Filipe Hill  : 1965  MRN: [id number]  Referring provider: Esequiel Blair MD  Dx:   Encounter Diagnosis     ICD-10-CM    1. Right foot pain  M79.671                      Subjective: ***      Objective: See treatment diary below      Assessment: Tolerated treatment {Tolerated treatment :}. Patient {assessment:}      Plan: {PLAN:}     Precautions: None      Manuals / 8    TC jt mobs HK PROM  KO HK HK HK HK PROM  KO HK HK     Mid foot mobs HK  HK HK HK HK  HK HK     Sub talar mobs HK  HK HK HK HK  HK HK     PROM      MM  HK HK KO    Ther Ex     Bike 10' 10' 10' 10' 10' 10' 10' 10' 10' 10'    Ankle mobility  Y  x30 Y  x30 Y  x30 Y  x30 Y  x30 Y 30x Y  x30 " "Y  x30 Y  x30 Y  x30    Slant str 3x30\" NP NP 3x30\" 3x30\" 3x30\" 3x30\" 3x30\" 3x30\" 3x30\"    Stand DF 3x30 3x30 3x30 3x30 3x30 3x30 3x30 3x30 3x30 3x30    Step downs X30  L1 X30  L2 X30  L2 X30  L2 X30  L2 X30 L2 X30  L2 X30  L2 X30  L2 X30  L2    SLS  NP NP NP NP NP NP NP NP NP NP    T-band IV 3x15  Red Green  3x10 Green  3x10 Green  3x15 Green  3x15 NP NP NP NP NP    BAPS NP NP NP                                                                                 MH NP NP 15' 15' 15' 15' NP 15' 15' 10'                                                Daily Note     Today's date: 2024  Patient name: Filipe Hill  : 1965  MRN: [id number]  Referring provider: Esequiel Blair MD  Dx:   Encounter Diagnosis     ICD-10-CM    1. Right foot pain  M79.671                      Subjective: Pt reports that yesterday he experienced the most pain he's had since the original incident.  Pt states that he was sitting all day in meetings and when he tried to walk, he was in a significant amount of pain.        Objective: See treatment diary below      Assessment: Tolerated treatment well. Patient demonstrated fatigue post treatment, exhibited good technique with therapeutic exercises, and would benefit from continued PT      Plan: Continue per plan of care.      Precautions: None      Manuals     TC jt mobs HK PROM  KO HK HK HK HK PROM  KO HK HK     Mid foot mobs HK  HK HK HK HK  HK HK     Sub talar mobs HK  HK HK HK HK  HK HK     PROM      MM  HK HK KO    Ther Ex     Bike 10' 10' 10' 10' 10' 10' 10' 10' 10' 10'    Ankle mobility  Y  x30 Y  x30 Y  x30 Y  x30 Y  x30 Y 30x Y  x30 Y  x30 Y  x30 Y  x30    Slant str 3x30\" NP NP 3x30\" 3x30\" 3x30\" 3x30\" 3x30\" 3x30\" 3x30\"    Stand DF 3x30 3x30 3x30 3x30 3x30 3x30 3x30 3x30 3x30 3x30    Step downs X30  L1 X30  L2 X30  L2 X30  L2 X30  L2 X30 L2 X30  L2 X30  L2 X30  L2 X30  L2    SLS  NP NP NP NP " NP NP NP NP NP NP    T-band IV 3x15  Red Green  3x10 Green  3x10 Green  3x15 Green  3x15 NP NP NP NP NP    BAPS NP NP NP                                                                                 MH NP NP 15' 15' 15' 15' NP 15' 15' 10'

## 2024-09-06 ENCOUNTER — TELEPHONE (OUTPATIENT)
Age: 59
End: 2024-09-06

## 2024-09-23 ENCOUNTER — OFFICE VISIT (OUTPATIENT)
Dept: PHYSICAL THERAPY | Facility: MEDICAL CENTER | Age: 59
End: 2024-09-23
Payer: COMMERCIAL

## 2024-09-23 DIAGNOSIS — M79.671 RIGHT FOOT PAIN: Primary | ICD-10-CM

## 2024-09-23 PROCEDURE — 97140 MANUAL THERAPY 1/> REGIONS: CPT | Performed by: PHYSICAL THERAPIST

## 2024-09-23 PROCEDURE — 97112 NEUROMUSCULAR REEDUCATION: CPT | Performed by: PHYSICAL THERAPIST

## 2024-09-23 PROCEDURE — 97110 THERAPEUTIC EXERCISES: CPT | Performed by: PHYSICAL THERAPIST

## 2024-09-23 NOTE — PROGRESS NOTES
"Daily Note     Today's date: 2024  Patient name: Filipe Hill  : 1965  MRN: [id number]  Referring provider: Esequiel Blair MD  Dx:   Encounter Diagnosis     ICD-10-CM    1. Right foot pain  M79.671                      Subjective: Pt reports that he was able to hike on vacation without much discomfort.  He states that his discomfort, when he has it is not in a consistent location.         Objective: See treatment diary below      Assessment: Tolerated treatment well. Patient demonstrated fatigue post treatment, exhibited good technique with therapeutic exercises, and would benefit from continued PT.  Pt is doing very well and progressing in all areas.        Plan: Continue per plan of care.      Precautions: None      Manuals    TC jt mobs  HK   Mid foot mobs  HK   Sub talar mobs  HK   PROM KO    Ther Ex    Bike 10' 10'   Ankle mobility  Y  x30 Y  x30   Slant str 3x30\" 3x30\"   Stand DF 3x30 3x30\"   Step downs 3x15  L2 L2  3x15   SLS  NP NP   T-band IV NP NP   BAPS  NP                            MH 10' NP                                        "

## 2024-09-27 ENCOUNTER — OFFICE VISIT (OUTPATIENT)
Dept: PHYSICAL THERAPY | Facility: MEDICAL CENTER | Age: 59
End: 2024-09-27
Payer: COMMERCIAL

## 2024-09-27 DIAGNOSIS — M79.671 RIGHT FOOT PAIN: Primary | ICD-10-CM

## 2024-09-27 PROCEDURE — 97110 THERAPEUTIC EXERCISES: CPT

## 2024-09-27 PROCEDURE — 97112 NEUROMUSCULAR REEDUCATION: CPT

## 2024-09-27 PROCEDURE — 97140 MANUAL THERAPY 1/> REGIONS: CPT

## 2024-09-27 NOTE — PROGRESS NOTES
"Daily Note     Today's date: 2024  Patient name: Filipe Hill  : 1965  MRN: [id number]  Referring provider: Esequiel Blair MD  Dx:   Encounter Diagnosis     ICD-10-CM    1. Right foot pain  M79.671                      Subjective: Pt reports that his foot is more sore today, especially along the outside of his foot.  Pt states that he is also experiencing tingling sensations in his foot which radiates to his great toe.     Objective: See treatment diary below      Assessment: Tolerated treatment well. Patient is responding well to current tx plan and would benefit from continued PT to return to PLOF.     Plan: Continue per plan of care.      Precautions: None      Manuals    TC jt mobs  HK    Mid foot mobs  HK    Sub talar mobs  HK    PROM KO  KO   Ther Ex    Bike 10' 10' 10'   Ankle mobility  Y  x30 Y  x30 Y  x30   Slant str 3x30\" 3x30\" 3x30\"   Stand DF 3x30 3x30\" 3x30\"   Step downs 3x15  L2 L2  3x15 L2  3x15   SLS  NP NP NP   T-band IV NP NP NP   BAPS  NP NP                                 MH 10' NP 15'                                           "

## 2024-10-03 ENCOUNTER — OFFICE VISIT (OUTPATIENT)
Dept: PHYSICAL THERAPY | Facility: MEDICAL CENTER | Age: 59
End: 2024-10-03
Payer: COMMERCIAL

## 2024-10-03 DIAGNOSIS — M79.671 RIGHT FOOT PAIN: Primary | ICD-10-CM

## 2024-10-03 PROCEDURE — 97110 THERAPEUTIC EXERCISES: CPT | Performed by: PHYSICAL THERAPIST

## 2024-10-03 PROCEDURE — 97140 MANUAL THERAPY 1/> REGIONS: CPT | Performed by: PHYSICAL THERAPIST

## 2024-10-03 PROCEDURE — 97112 NEUROMUSCULAR REEDUCATION: CPT | Performed by: PHYSICAL THERAPIST

## 2024-10-03 NOTE — PROGRESS NOTES
"Daily Note     Today's date: 10/3/2024  Patient name: Filipe Hill  : 1965  MRN: [id number]  Referring provider: Esequiel Blair MD  Dx:   Encounter Diagnosis     ICD-10-CM    1. Right foot pain  M79.671                      Subjective: Pt reports that generally his foot is doing well.  He doesn't feel the same mobility in the R foot as he does in the left, but it is improved.        Objective: See treatment diary below      Assessment: Tolerated treatment well. Patient demonstrated fatigue post treatment, exhibited good technique with therapeutic exercises, and would benefit from continued PT      Plan: Continue per plan of care.      Precautions: None      Manuals 8/22 9/23 9/27 10/3   TC jt mobs  HK  HK   Mid foot mobs  HK  HK   Sub talar mobs  HK  HK   PROM KO  KO    Ther Ex 8/22 9/23 9/27 10/3   Bike 10' 10' 10' 10'   Ankle mobility  Y  x30 Y  x30 Y  x30 Y  x30   Slant str 3x30\" 3x30\" 3x30\" 3x30\"   Stand DF 3x30 3x30\" 3x30\" 3x30\"   Step downs 3x15  L2 L2  3x15 L2  3x15 L2  3x15   SLS  NP NP NP NP   T-band IV NP NP NP NP   BAPS  NP NP NP                                      MH 10' NP 15'                                               "

## 2024-10-12 ENCOUNTER — OFFICE VISIT (OUTPATIENT)
Dept: URGENT CARE | Age: 59
End: 2024-10-12
Payer: COMMERCIAL

## 2024-10-12 VITALS
HEART RATE: 97 BPM | OXYGEN SATURATION: 95 % | TEMPERATURE: 98.5 F | RESPIRATION RATE: 16 BRPM | SYSTOLIC BLOOD PRESSURE: 141 MMHG | DIASTOLIC BLOOD PRESSURE: 95 MMHG

## 2024-10-12 DIAGNOSIS — H00.036 EYELID CELLULITIS, LEFT: Primary | ICD-10-CM

## 2024-10-12 RX ORDER — AMOXICILLIN AND CLAVULANATE POTASSIUM 875; 125 MG/1; MG/1
1 TABLET, FILM COATED ORAL 2 TIMES DAILY
Qty: 20 TABLET | Refills: 0 | Status: SHIPPED | OUTPATIENT
Start: 2024-10-12 | End: 2024-10-12

## 2024-10-12 RX ORDER — ERYTHROMYCIN 5 MG/G
OINTMENT OPHTHALMIC 4 TIMES DAILY
Qty: 3.5 G | Refills: 0 | Status: SHIPPED | OUTPATIENT
Start: 2024-10-12 | End: 2024-10-19

## 2024-10-12 RX ORDER — ERYTHROMYCIN 5 MG/G
OINTMENT OPHTHALMIC 4 TIMES DAILY
Qty: 3.5 G | Refills: 0 | Status: SHIPPED | OUTPATIENT
Start: 2024-10-12 | End: 2024-10-12

## 2024-10-12 RX ORDER — AMOXICILLIN AND CLAVULANATE POTASSIUM 875; 125 MG/1; MG/1
1 TABLET, FILM COATED ORAL 2 TIMES DAILY
Qty: 20 TABLET | Refills: 0 | Status: SHIPPED | OUTPATIENT
Start: 2024-10-12 | End: 2024-10-22

## 2024-10-12 ASSESSMENT — ENCOUNTER SYMPTOMS
RHINORRHEA: 0
SORE THROAT: 0
EYE PAIN: 1
FEVER: 0
FATIGUE: 0
CHILLS: 0

## 2024-10-12 NOTE — PROGRESS NOTES
Subjective   Patient ID: Nicolás Rider is a 59 y.o. male. They present today with a chief complaint of Eye Problem (Irritation/drainage).    History of Present Illness  Patient presents today for left eye irritation. He states that he woke up this AM with pain to left lower eyelid and notes his eye was glued shut. He applied a warm compress x 10 minutes and was able to open his eye.  He states that his eyelid felt swollen. Denies any itching or injury. He typically wears contact lenses, though couldn't today due to this.  He states that he has had a corneal abrasion before and this feels different. When he doesn't wear his eyeglasses he gets eye pain and photophobia.  He denies any fevers, chills, headaches, nausea/vomiting, foreign body sensation, itching or active discharge. He denies runny nose, congestion, sore throat, ear pain. No contacts with eye infection.           Past Medical History  Allergies as of 10/12/2024 - Reviewed 10/12/2024   Allergen Reaction Noted    Bee venom protein (honey bee) Hives 10/12/2024       (Not in a hospital admission)       No past medical history on file.    No past surgical history on file.         Review of Systems  Review of Systems   Constitutional:  Negative for chills, fatigue and fever.   HENT:  Negative for congestion, ear discharge, ear pain, rhinorrhea and sore throat.    Eyes:  Positive for pain.                                  Objective    Vitals:    10/12/24 0945   BP: (!) 141/95   Pulse: 97   Resp: 16   Temp: 36.9 °C (98.5 °F)   SpO2: 95%     No LMP for male patient.    Physical Exam  Vitals and nursing note reviewed.   Constitutional:       Appearance: Normal appearance.   HENT:      Head: Normocephalic.      Nose: No congestion or rhinorrhea.   Eyes:      General:         Right eye: No discharge or hordeolum.         Left eye: No discharge or hordeolum.      Conjunctiva/sclera:      Right eye: Right conjunctiva is not injected. No exudate.     Left eye: Left  conjunctiva is injected (mild to lateral eye). No exudate.     Comments: Left lower eyelid is erythematous and swollen, there is no active discharge.    Cardiovascular:      Rate and Rhythm: Normal rate and regular rhythm.      Heart sounds: Normal heart sounds.   Pulmonary:      Effort: Pulmonary effort is normal. No respiratory distress.      Breath sounds: Normal breath sounds.   Lymphadenopathy:      Cervical: No cervical adenopathy.   Neurological:      Mental Status: He is alert.         Procedures    Point of Care Test & Imaging Results from this visit  No results found for this visit on 10/12/24.   No results found.    Diagnostic study results (if any) were reviewed by Sandra Roberto PA-C.    Assessment/Plan   Allergies, medications, history, and pertinent labs/EKGs/Imaging reviewed by Sandra Roberto PA-C.     Medical Decision Making  MDM- Signs and symptoms consistent with cellulitis to left lower eyelid. No evidence of sepsis, abscess, or other complications. Plan is for antibiotic therapy and symptomatic support at home. Plan to follow with PCP. Patient advised to return to clinic or go to the ED if symptoms change or worsen. Patient verbalized understanding and agrees with plan.       Orders and Diagnoses  Diagnoses and all orders for this visit:  Eyelid cellulitis, left  -     erythromycin (Romycin) 5 mg/gram (0.5 %) ophthalmic ointment; Apply to affected eye(s) 4 times a day for 7 days. Apply Amount per Dose: 0.25 inch (~0.5 cm) per dose.  -     amoxicillin-pot clavulanate (Augmentin) 875-125 mg tablet; Take 1 tablet by mouth 2 times a day for 10 days.      Medical Admin Record      Patient disposition: Home    Electronically signed by Sandra Roberto PA-C  10:03 AM

## 2024-10-21 ENCOUNTER — OFFICE VISIT (OUTPATIENT)
Dept: PHYSICAL THERAPY | Facility: MEDICAL CENTER | Age: 59
End: 2024-10-21
Payer: COMMERCIAL

## 2024-10-21 DIAGNOSIS — M79.671 RIGHT FOOT PAIN: Primary | ICD-10-CM

## 2024-10-21 PROCEDURE — 97110 THERAPEUTIC EXERCISES: CPT | Performed by: PHYSICAL THERAPIST

## 2024-10-21 PROCEDURE — 97112 NEUROMUSCULAR REEDUCATION: CPT | Performed by: PHYSICAL THERAPIST

## 2024-10-21 PROCEDURE — 97140 MANUAL THERAPY 1/> REGIONS: CPT | Performed by: PHYSICAL THERAPIST

## 2024-10-21 NOTE — PROGRESS NOTES
"Daily Note     Today's date: 10/21/2024  Patient name: Filipe Hill  : 1965  MRN: [id number]  Referring provider: Esequiel Blair MD  Dx:   Encounter Diagnosis     ICD-10-CM    1. Right foot pain  M79.671                      Subjective: Pt reports that he had a consultation with a surgeon in [place] that was confident he could do surgery to help his foot/ankle recover more completely.  He is going to meet with him in person in the next week or so.        Objective: See treatment diary below      Assessment: Tolerated treatment well. Patient demonstrated fatigue post treatment, exhibited good technique with therapeutic exercises, and would benefit from continued PT      Plan: Continue per plan of care.      Precautions: None      Manuals 8/22 9/23 9/27 10/3 10/21   TC jt mobs  HK  HK HK   Mid foot mobs  HK  HK HK   Sub talar mobs  HK  HK HK   PROM KO  KO     Ther Ex 8/22 9/23 9/27 10/3 10/21   Bike 10' 10' 10' 10' 10'   Ankle mobility  Y  x30 Y  x30 Y  x30 Y  x30 Y  x30   Slant str 3x30\" 3x30\" 3x30\" 3x30\" 3x30\"   Stand DF 3x30 3x30\" 3x30\" 3x30\" 3x30\"   Step downs 3x15  L2 L2  3x15 L2  3x15 L2  3x15 L3  3x15   SLS  NP NP NP NP NP   T-band IV NP NP NP NP NP   BAPS  NP NP NP NP                                           MH 10' NP 15'                                                   "

## 2024-10-28 ENCOUNTER — TELEPHONE (OUTPATIENT)
Age: 59
End: 2024-10-28

## 2024-10-28 DIAGNOSIS — Z12.5 SCREENING FOR PROSTATE CANCER: ICD-10-CM

## 2024-10-28 DIAGNOSIS — Z13.0 SCREENING FOR DEFICIENCY ANEMIA: ICD-10-CM

## 2024-10-28 DIAGNOSIS — E78.2 MIXED HYPERLIPIDEMIA: Primary | ICD-10-CM

## 2024-10-28 NOTE — TELEPHONE ENCOUNTER
Pt called to schedule his annual PE for late November.  Scheduled first avail on 12/31, would like sooner if possible.  Unavailable 12/3-12/7, having foot sx.  Please advise if sooner appt avail.    Also requesting routine lab orders be placed so he can complete prior to OV.  Requested a message via [employer] to inform once placed.

## 2024-11-08 ENCOUNTER — OFFICE VISIT (OUTPATIENT)
Dept: PHYSICAL THERAPY | Facility: MEDICAL CENTER | Age: 59
End: 2024-11-08
Payer: COMMERCIAL

## 2024-11-08 DIAGNOSIS — M79.671 RIGHT FOOT PAIN: Primary | ICD-10-CM

## 2024-11-08 PROCEDURE — 97110 THERAPEUTIC EXERCISES: CPT | Performed by: PHYSICAL THERAPIST

## 2024-11-08 PROCEDURE — 97140 MANUAL THERAPY 1/> REGIONS: CPT | Performed by: PHYSICAL THERAPIST

## 2024-11-08 PROCEDURE — 97112 NEUROMUSCULAR REEDUCATION: CPT | Performed by: PHYSICAL THERAPIST

## 2024-11-08 NOTE — PROGRESS NOTES
"Daily Note     Today's date: 2024  Patient name: Filipe Hill  : 1965  MRN: [id number]  Referring provider: Esequiel Blair MD  Dx:   Encounter Diagnosis     ICD-10-CM    1. Right foot pain  M79.671                      Subjective: Pt reports that he has his surgery schedule for .  He will plan to talk send post op rehab instructions once he gets him.        Objective: See treatment diary below      Assessment: Pt is scheduled for surgery and no further skilled PT is indicated at this time.  Pt would benefit from continuation with an I HEP.       Plan: D/C PT      Precautions: None      Manuals 8/22 9/23 9/27 10/3 10/21 11/8   TC jt mobs  HK  HK HK HK   Mid foot mobs  HK  HK HK HK   Sub talar mobs  HK  HK HK HK   PROM KO  KO      Ther Ex 8/22 9/23 9/27 10/3 10/21 11/8   Bike 10' 10' 10' 10' 10' 10'   Ankle mobility  Y  x30 Y  x30 Y  x30 Y  x30 Y  x30 Y  x30   Slant str 3x30\" 3x30\" 3x30\" 3x30\" 3x30\" 3x30\"   Stand DF 3x30 3x30\" 3x30\" 3x30\" 3x30\" 3x30\"   Step downs 3x15  L2 L2  3x15 L2  3x15 L2  3x15 L3  3x15 L3  3x15   SLS  NP NP NP NP NP    T-band IV NP NP NP NP NP    BAPS  NP NP NP NP                                                 MH 10' NP 15'                                                       "

## 2024-12-16 ENCOUNTER — APPOINTMENT (OUTPATIENT)
Dept: LAB | Facility: MEDICAL CENTER | Age: 59
End: 2024-12-16
Payer: COMMERCIAL

## 2024-12-16 ENCOUNTER — APPOINTMENT (OUTPATIENT)
Dept: RADIOLOGY | Facility: MEDICAL CENTER | Age: 59
End: 2024-12-16
Payer: COMMERCIAL

## 2024-12-16 DIAGNOSIS — Q66.10: ICD-10-CM

## 2024-12-16 DIAGNOSIS — M25.571 RIGHT ANKLE PAIN, UNSPECIFIED CHRONICITY: ICD-10-CM

## 2024-12-16 DIAGNOSIS — Z13.0 SCREENING FOR DEFICIENCY ANEMIA: ICD-10-CM

## 2024-12-16 DIAGNOSIS — Z12.5 SCREENING FOR PROSTATE CANCER: ICD-10-CM

## 2024-12-16 DIAGNOSIS — M76.71 PERONEAL TENDINITIS OF RIGHT LOWER EXTREMITY: ICD-10-CM

## 2024-12-16 DIAGNOSIS — E78.2 MIXED HYPERLIPIDEMIA: ICD-10-CM

## 2024-12-16 LAB
ALBUMIN SERPL BCG-MCNC: 4.5 G/DL (ref 3.5–5)
ALP SERPL-CCNC: 75 U/L (ref 34–104)
ALT SERPL W P-5'-P-CCNC: 29 U/L (ref 7–52)
ANION GAP SERPL CALCULATED.3IONS-SCNC: 11 MMOL/L (ref 4–13)
AST SERPL W P-5'-P-CCNC: 22 U/L (ref 13–39)
BASOPHILS # BLD AUTO: 0.12 THOUSANDS/ÂΜL (ref 0–0.1)
BASOPHILS NFR BLD AUTO: 2 % (ref 0–1)
BILIRUB SERPL-MCNC: 0.88 MG/DL (ref 0.2–1)
BUN SERPL-MCNC: 20 MG/DL (ref 5–25)
CALCIUM SERPL-MCNC: 9.6 MG/DL (ref 8.4–10.2)
CHLORIDE SERPL-SCNC: 103 MMOL/L (ref 96–108)
CHOLEST SERPL-MCNC: 189 MG/DL (ref ?–200)
CO2 SERPL-SCNC: 23 MMOL/L (ref 21–32)
CREAT SERPL-MCNC: 0.86 MG/DL (ref 0.6–1.3)
EOSINOPHIL # BLD AUTO: 0.23 THOUSAND/ÂΜL (ref 0–0.61)
EOSINOPHIL NFR BLD AUTO: 4 % (ref 0–6)
ERYTHROCYTE [DISTWIDTH] IN BLOOD BY AUTOMATED COUNT: 11.8 % (ref 11.6–15.1)
GFR SERPL CREATININE-BSD FRML MDRD: 94 ML/MIN/1.73SQ M
GLUCOSE P FAST SERPL-MCNC: 94 MG/DL (ref 65–99)
HCT VFR BLD AUTO: 50.1 % (ref 36.5–49.3)
HDLC SERPL-MCNC: 38 MG/DL
HGB BLD-MCNC: 17.5 G/DL (ref 12–17)
IMM GRANULOCYTES # BLD AUTO: 0.02 THOUSAND/UL (ref 0–0.2)
IMM GRANULOCYTES NFR BLD AUTO: 0 % (ref 0–2)
LDLC SERPL CALC-MCNC: 113 MG/DL (ref 0–100)
LYMPHOCYTES # BLD AUTO: 2.32 THOUSANDS/ÂΜL (ref 0.6–4.47)
LYMPHOCYTES NFR BLD AUTO: 40 % (ref 14–44)
MCH RBC QN AUTO: 32.1 PG (ref 26.8–34.3)
MCHC RBC AUTO-ENTMCNC: 34.9 G/DL (ref 31.4–37.4)
MCV RBC AUTO: 92 FL (ref 82–98)
MONOCYTES # BLD AUTO: 0.74 THOUSAND/ÂΜL (ref 0.17–1.22)
MONOCYTES NFR BLD AUTO: 13 % (ref 4–12)
NEUTROPHILS # BLD AUTO: 2.31 THOUSANDS/ÂΜL (ref 1.85–7.62)
NEUTS SEG NFR BLD AUTO: 41 % (ref 43–75)
NRBC BLD AUTO-RTO: 0 /100 WBCS
PLATELET # BLD AUTO: 264 THOUSANDS/UL (ref 149–390)
PMV BLD AUTO: 10.3 FL (ref 8.9–12.7)
POTASSIUM SERPL-SCNC: 4.3 MMOL/L (ref 3.5–5.3)
PROT SERPL-MCNC: 7.1 G/DL (ref 6.4–8.4)
PSA SERPL-MCNC: 0.41 NG/ML (ref 0–4)
RBC # BLD AUTO: 5.45 MILLION/UL (ref 3.88–5.62)
SODIUM SERPL-SCNC: 137 MMOL/L (ref 135–147)
TRIGL SERPL-MCNC: 188 MG/DL (ref ?–150)
TSH SERPL DL<=0.05 MIU/L-ACNC: 3.54 UIU/ML (ref 0.45–4.5)
WBC # BLD AUTO: 5.74 THOUSAND/UL (ref 4.31–10.16)

## 2024-12-16 PROCEDURE — 36415 COLL VENOUS BLD VENIPUNCTURE: CPT

## 2024-12-16 PROCEDURE — G0103 PSA SCREENING: HCPCS

## 2024-12-16 PROCEDURE — 84443 ASSAY THYROID STIM HORMONE: CPT

## 2024-12-16 PROCEDURE — 85025 COMPLETE CBC W/AUTO DIFF WBC: CPT

## 2024-12-16 PROCEDURE — 80061 LIPID PANEL: CPT

## 2024-12-16 PROCEDURE — 80053 COMPREHEN METABOLIC PANEL: CPT

## 2024-12-16 PROCEDURE — 73610 X-RAY EXAM OF ANKLE: CPT

## 2024-12-31 ENCOUNTER — OFFICE VISIT (OUTPATIENT)
Dept: FAMILY MEDICINE CLINIC | Facility: CLINIC | Age: 59
End: 2024-12-31
Payer: COMMERCIAL

## 2024-12-31 VITALS
OXYGEN SATURATION: 98 % | SYSTOLIC BLOOD PRESSURE: 118 MMHG | TEMPERATURE: 98.4 F | BODY MASS INDEX: 30.12 KG/M2 | HEIGHT: 75 IN | HEART RATE: 105 BPM | DIASTOLIC BLOOD PRESSURE: 76 MMHG

## 2024-12-31 DIAGNOSIS — Z00.00 ANNUAL PHYSICAL EXAM: Primary | ICD-10-CM

## 2024-12-31 PROCEDURE — 99396 PREV VISIT EST AGE 40-64: CPT | Performed by: FAMILY MEDICINE

## 2024-12-31 RX ORDER — GABAPENTIN 300 MG/1
300 CAPSULE ORAL 3 TIMES DAILY
COMMUNITY

## 2024-12-31 NOTE — PROGRESS NOTES
Better adult Annual Physical  Name: Filipe Hill      : 1965      MRN: [id number]  Encounter Provider: Nicholas Cortez DO  Encounter Date: 2024   Encounter department: [facility]    Assessment & Plan  Annual physical exam  Physical exam unremarkable.  Reviewed recent medical events involving surgery on his right foot.  Discussed his ongoing treatment with pulmonary medicine.  Discussed recent lab work which showed improved cholesterol control and otherwise was unremarkable.  Immunizations: Screening all reviewed.       Immunizations and preventive care screenings were discussed with patient today. Appropriate education was printed on patient's after visit summary.    Discussed risks and benefits of prostate cancer screening. We discussed the controversial history of PSA screening for prostate cancer in the [place] as well as the risk of over detection and over treatment of prostate cancer by way of PSA screening.  The patient understands that PSA blood testing is an imperfect way to screen for prostate cancer and that elevated PSA levels in the blood may also be caused by infection, inflammation, prostatic trauma or manipulation, urological procedures, or by benign prostatic enlargement.    The role of the digital rectal examination in prostate cancer screening was also discussed and I discussed with him that there is large interobserver variability in the findings of digital rectal examination.    Counseling:  Alcohol/drug use: discussed moderation in alcohol intake, the recommendations for healthy alcohol use, and avoidance of illicit drug use.  Dental Health: discussed importance of regular tooth brushing, flossing, and dental visits.  Injury prevention: discussed safety/seat belts, safety helmets, smoke detectors, carbon monoxide detectors, and smoking near bedding or upholstery.  Exercise: the importance of regular exercise/physical activity was discussed.  "Recommend exercise 3-5 times per week for at least 30 minutes.          History of Present Illness     Adult Annual Physical:  Patient presents for annual physical. Patient presents for annual physical exam.  Overall he feels his health is good though he has had an issue with his right foot which involved a complicated surgery done in [place] several weeks ago.  He is currently nonweightbearing in a boot.  He otherwise feels well..     Diet and Physical Activity:  - Diet/Nutrition: well balanced diet.  - Exercise: no formal exercise.    Depression Screening:  - PHQ-2 Score: 0    General Health:  - Sleep: sleeps poorly.  - Hearing: normal hearing left ear.  - Vision: no vision problems.  - Dental: regular dental visits.     Health:    - Urinary symptoms: none.     Review of Systems   Constitutional: Negative.    Respiratory: Negative.     Cardiovascular: Negative.    Gastrointestinal: Negative.    Genitourinary: Negative.    Musculoskeletal: Negative.    Psychiatric/Behavioral: Negative.           Objective   /76   Pulse 105   Temp 98.4 °F (36.9 °C) (Temporal)   Ht 6' 3\" (1.905 m)   SpO2 98%   BMI 30.12 kg/m²     Physical Exam  Vitals and nursing note reviewed.   Constitutional:       General: He is not in acute distress.     Appearance: Normal appearance.   HENT:      Head: Normocephalic and atraumatic.   Eyes:      Pupils: Pupils are equal, round, and reactive to light.   Cardiovascular:      Rate and Rhythm: Normal rate and regular rhythm.      Pulses: Normal pulses.      Heart sounds: Normal heart sounds.   Pulmonary:      Effort: Pulmonary effort is normal.      Breath sounds: Normal breath sounds.   Musculoskeletal:         General: Normal range of motion.      Cervical back: Normal range of motion.   Skin:     General: Skin is warm and dry.   Neurological:      General: No focal deficit present.      Mental Status: He is alert and oriented to person, place, and time. Mental status is at baseline. "   Psychiatric:         Mood and Affect: Mood normal.         Behavior: Behavior normal.         Thought Content: Thought content normal.         Judgment: Judgment normal.

## 2025-01-03 NOTE — ASSESSMENT & PLAN NOTE
Physical exam unremarkable.  Reviewed recent medical events involving surgery on his right foot.  Discussed his ongoing treatment with pulmonary medicine.  Discussed recent lab work which showed improved cholesterol control and otherwise was unremarkable.  Immunizations: Screening all reviewed.

## 2025-01-13 ENCOUNTER — APPOINTMENT (OUTPATIENT)
Dept: RADIOLOGY | Facility: MEDICAL CENTER | Age: 60
End: 2025-01-13
Payer: COMMERCIAL

## 2025-01-13 DIAGNOSIS — M76.71 PERONEAL TENDINITIS OF RIGHT LOWER EXTREMITY: ICD-10-CM

## 2025-01-13 DIAGNOSIS — M25.571 RIGHT ANKLE PAIN, UNSPECIFIED CHRONICITY: ICD-10-CM

## 2025-01-13 DIAGNOSIS — Q66.10: ICD-10-CM

## 2025-01-13 PROCEDURE — 73610 X-RAY EXAM OF ANKLE: CPT

## 2025-01-17 ENCOUNTER — EVALUATION (OUTPATIENT)
Dept: PHYSICAL THERAPY | Facility: MEDICAL CENTER | Age: 60
End: 2025-01-17
Payer: COMMERCIAL

## 2025-01-17 DIAGNOSIS — M25.571 RIGHT ANKLE PAIN, UNSPECIFIED CHRONICITY: Primary | ICD-10-CM

## 2025-01-17 DIAGNOSIS — M25.371 RIGHT ANKLE INSTABILITY: ICD-10-CM

## 2025-01-17 PROCEDURE — 97140 MANUAL THERAPY 1/> REGIONS: CPT | Performed by: PHYSICAL THERAPIST

## 2025-01-17 PROCEDURE — 97161 PT EVAL LOW COMPLEX 20 MIN: CPT | Performed by: PHYSICAL THERAPIST

## 2025-01-17 NOTE — PROGRESS NOTES
PT Evaluation     Today's date: 2025  Patient name: Filipe Hill  : 1965  MRN: [id number]  Referring provider: Marcos Curry MD  Dx:   Encounter Diagnosis     ICD-10-CM    1. Right ankle pain, unspecified chronicity  M25.571       2. Right ankle instability  M25.371                      Assessment    Assessment details: Pt is a pleasant 60 yo male presenting to physical therapy s/p R ankle gastrocnemius recession, Son osteotomy, peroneus longus to brevis, Brostrom, peroneal tendons repair.  Pt would benefit from skilled PT to address current impairments and return pt to pre-morbid function     Understanding of Dx/Px/POC: good     Prognosis: good    Goals  Impairment Goals  - Pt I with initial HEP in 1-2 visits  - Improve ROM equal to contralateral side in 8+ weeks  - Increase strength to 5/5 in all affected areas in 8+ weeks    Functional Goals  - Increase FOTO to greater than the goal score established in the initial FOTO report in 12 weeks  - Patient will be independent with comprehensive HEP in 12 weeks  - Ambulation is improved to prior level of function in 6-8 weeks  - Stair climbing is improved to prior level of function in 6-8 weeks  - Squatting is improved to prior level of function in 6-8 weeks   - Patient will be able to return to pre-morbid activity level with min to no difficulty or discomfort in 12 weeks    Plan  Patient would benefit from: skilled physical therapy  Other planned modality interventions: Modalities prn    Planned therapy interventions: manual therapy, neuromuscular re-education, balance, gait training, home exercise program, therapeutic exercise, therapeutic activities, stretching, strengthening and patient/caregiver education    Frequency: 2x week  Duration in weeks: 12  Treatment plan discussed with: patient and family        Subjective Evaluation    History of Present Illness  Date of surgery: 2024  Mechanism of injury: Pt had continued pain and limitations  with function following conservative management of his R foot and ankle pain.  Pt decided to proceed with surgery.    Patient Goals  Patient goals for therapy: decreased pain, decreased edema, improved balance, increased motion, increased strength, return to sport/leisure activities and return to work    Pain  Current pain ratin  At best pain ratin  At worst pain ratin    Social Support    Employment status: working ()  Exercise history: softball, weight training,  cardio      Objective     Observations     Additional Observation Details  Gait:  Dysfunctional with increased toeing out R and decreased stance time R    Balance:  Pt is unable to SLS on his R LE    Pt with a healing wound R lateral ankle, there is a spot to monitor at the most proximal point of the wound that may be an internal stitch that is being rejected.      Active Range of Motion   Left Ankle/Foot   Dorsiflexion (ke): 5 degrees   Plantar flexion: 60 degrees   Inversion: 40 degrees   Eversion: 15 degrees     Right Ankle/Foot   Dorsiflexion (ke): -10 degrees   Plantar flexion: 35 degrees   Eversion: 5 degrees     Additional Active Range of Motion Details  R ankle IV NT due to surgical precautions/restrictions     Joint Play   Left Ankle/Foot  Joints within functional limits are the talocrural joint and subtalar joint.     Right Ankle/Foot  Hypomobile in the talocrural joint and subtalar joint.     Strength/Myotome Testing     Left Ankle/Foot   Dorsiflexion: 5  Plantar flexion: 5  Inversion: 5  Eversion: 5    Right Ankle/Foot   Dorsiflexion: 2  Plantar flexion: 2  Inversion: 2  Eversion: 2             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, Brostrom, peroneal tendons repair       Manuals             PROM  HK                                                   Ther Ex             HEP review HK                                                                                                                                                                                                                                                       Gait Training 1/17            Brief HK                         Modalities 1/17             15'

## 2025-01-20 ENCOUNTER — OFFICE VISIT (OUTPATIENT)
Dept: PHYSICAL THERAPY | Facility: MEDICAL CENTER | Age: 60
End: 2025-01-20
Payer: COMMERCIAL

## 2025-01-20 DIAGNOSIS — M25.571 RIGHT ANKLE PAIN, UNSPECIFIED CHRONICITY: Primary | ICD-10-CM

## 2025-01-20 DIAGNOSIS — M25.371 RIGHT ANKLE INSTABILITY: ICD-10-CM

## 2025-01-20 PROCEDURE — 97140 MANUAL THERAPY 1/> REGIONS: CPT | Performed by: PHYSICAL THERAPIST

## 2025-01-20 NOTE — PROGRESS NOTES
Daily Note     Today's date: 2025  Patient name: Filipe Hill  : 1965  MRN: [id number]  Referring provider: Marcos Curry MD  Dx:   Encounter Diagnosis     ICD-10-CM    1. Right ankle pain, unspecified chronicity  M25.571       2. Right ankle instability  M25.371                      Subjective: Pt reports that his calf and ankle are sore from walking around without the boot.       Objective: See treatment diary below    + redness around the distal part of the wound    Pt instructed to contact his surgeon's office regarding the redness.      Assessment: Tolerated treatment well. Patient would benefit from continued PT      Plan: Continue per plan of care.      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, Brostrom, peroneal tendons repair       Manuals            PROM  HK HK                                                  Ther Ex             HEP review HK                                                                                                                                                                                                                                                      Gait Training            Brief HK HK                        Modalities             15' 15'

## 2025-01-22 ENCOUNTER — TELEPHONE (OUTPATIENT)
Age: 60
End: 2025-01-22

## 2025-01-22 NOTE — TELEPHONE ENCOUNTER
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 Jessica transferred patient to Geraldine at [facility] clerical team for further assistance scheduling.

## 2025-01-23 ENCOUNTER — OFFICE VISIT (OUTPATIENT)
Dept: PHYSICAL THERAPY | Facility: MEDICAL CENTER | Age: 60
End: 2025-01-23
Payer: COMMERCIAL

## 2025-01-23 DIAGNOSIS — M25.371 RIGHT ANKLE INSTABILITY: ICD-10-CM

## 2025-01-23 DIAGNOSIS — M25.571 RIGHT ANKLE PAIN, UNSPECIFIED CHRONICITY: Primary | ICD-10-CM

## 2025-01-23 PROCEDURE — 97140 MANUAL THERAPY 1/> REGIONS: CPT | Performed by: PHYSICAL THERAPIST

## 2025-01-23 PROCEDURE — 97110 THERAPEUTIC EXERCISES: CPT | Performed by: PHYSICAL THERAPIST

## 2025-01-23 NOTE — PROGRESS NOTES
"Daily Note     Today's date: 2025  Patient name: Filipe Hill  : 1965  MRN: [id number]  Referring provider: Marcos Curry MD  Dx:   Encounter Diagnosis     ICD-10-CM    1. Right ankle pain, unspecified chronicity  M25.571       2. Right ankle instability  M25.371                      Subjective: Pt reports that after he was in the pool for an hour he could barely walk.  He is feeling better today.        Objective: See treatment diary below      Assessment: Tolerated treatment well. Patient demonstrated fatigue post treatment, exhibited good technique with therapeutic exercises, and would benefit from continued PT      Plan: Continue per plan of care.      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, Brostrom, peroneal tendons repair       Manuals           PROM  HK HK HK                                                 Ther Ex           HEP review HK            Bike   10'          Step downs   L1  3x10          Slant str    3x30\"                                                                                                                                                                                                             Gait Training           Brief HK HK HK                       Modalities            15' 15' 15'                              "

## 2025-01-24 ENCOUNTER — OFFICE VISIT (OUTPATIENT)
Dept: PHYSICAL THERAPY | Facility: MEDICAL CENTER | Age: 60
End: 2025-01-24
Payer: COMMERCIAL

## 2025-01-24 DIAGNOSIS — M25.371 RIGHT ANKLE INSTABILITY: ICD-10-CM

## 2025-01-24 DIAGNOSIS — M25.571 RIGHT ANKLE PAIN, UNSPECIFIED CHRONICITY: Primary | ICD-10-CM

## 2025-01-24 PROCEDURE — 97110 THERAPEUTIC EXERCISES: CPT | Performed by: PHYSICAL THERAPIST

## 2025-01-24 PROCEDURE — 97140 MANUAL THERAPY 1/> REGIONS: CPT | Performed by: PHYSICAL THERAPIST

## 2025-01-24 NOTE — PROGRESS NOTES
"Daily Note     Today's date: 2025  Patient name: Filipe Hill  : 1965  MRN: [id number]  Referring provider: Marcos Curry MD  Dx:   Encounter Diagnosis     ICD-10-CM    1. Right ankle pain, unspecified chronicity  M25.571       2. Right ankle instability  M25.371                      Subjective: Pt reports that his calf is sore, but he is not in any more discomfort since yesterdays visit.  In general he feels like his \"nerve pain\" is more since he started to do more.        Objective: See treatment diary below      Assessment: Tolerated treatment well. Patient exhibited good technique with therapeutic exercises and would benefit from continued PT      Plan: Continue per plan of care.      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, Brostrom, peroneal tendons repair       Manuals          PROM  HK HK HK HK                                                Ther Ex          HEP review HK            Bike   10' 10'         Step downs   L1  3x10 L1  3x10         Slant str    3x30\" 3x30\"                                                                                                                                                                                                            Gait Training           Brief HK HK HK                       Modalities          MH 15' 15' 15' 15'                               "

## 2025-01-27 ENCOUNTER — OFFICE VISIT (OUTPATIENT)
Dept: PHYSICAL THERAPY | Facility: MEDICAL CENTER | Age: 60
End: 2025-01-27
Payer: COMMERCIAL

## 2025-01-27 DIAGNOSIS — M25.371 RIGHT ANKLE INSTABILITY: ICD-10-CM

## 2025-01-27 DIAGNOSIS — M25.571 RIGHT ANKLE PAIN, UNSPECIFIED CHRONICITY: Primary | ICD-10-CM

## 2025-01-27 PROCEDURE — 97110 THERAPEUTIC EXERCISES: CPT | Performed by: PHYSICAL THERAPIST

## 2025-01-27 PROCEDURE — 97140 MANUAL THERAPY 1/> REGIONS: CPT | Performed by: PHYSICAL THERAPIST

## 2025-01-27 NOTE — PROGRESS NOTES
"Daily Note     Today's date: 2025  Patient name: Filipe Hill  : 1965  MRN: [id number]  Referring provider: Marcos Curry MD  Dx:   Encounter Diagnosis     ICD-10-CM    1. Right ankle pain, unspecified chronicity  M25.571       2. Right ankle instability  M25.371                      Subjective: Pt reports that generally he is doing ok and has been increasing his activity level.  Prolonged standing is still very difficult.  He [place] from the airport yesterday and the amount of driving he did was the limit.        Objective: See treatment diary below      Assessment: Tolerated treatment well. Patient demonstrated fatigue post treatment, exhibited good technique with therapeutic exercises, and would benefit from continued PT      Plan: Continue per plan of care.      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, Brostrom, peroneal tendons repair       Manuals          PROM  HK HK HK HK                                                Ther Ex          HEP review HK            Bike   10' 10'         Step downs   L1  3x10 L1  3x10         Slant str    3x30\" 3x30\"                                                                                                                                                                                                            Gait Training           Brief HK HK HK                       Modalities          MH 15' 15' 15' 15'                                 "

## 2025-01-30 ENCOUNTER — OFFICE VISIT (OUTPATIENT)
Dept: PHYSICAL THERAPY | Facility: MEDICAL CENTER | Age: 60
End: 2025-01-30
Payer: COMMERCIAL

## 2025-01-30 DIAGNOSIS — M25.371 RIGHT ANKLE INSTABILITY: ICD-10-CM

## 2025-01-30 DIAGNOSIS — M25.571 RIGHT ANKLE PAIN, UNSPECIFIED CHRONICITY: Primary | ICD-10-CM

## 2025-01-30 PROCEDURE — 97140 MANUAL THERAPY 1/> REGIONS: CPT | Performed by: PHYSICAL THERAPIST

## 2025-01-30 PROCEDURE — 97110 THERAPEUTIC EXERCISES: CPT | Performed by: PHYSICAL THERAPIST

## 2025-01-30 PROCEDURE — 97112 NEUROMUSCULAR REEDUCATION: CPT | Performed by: PHYSICAL THERAPIST

## 2025-01-30 NOTE — PROGRESS NOTES
"Daily Note     Today's date: 2025  Patient name: Filipe Hill  : 1965  MRN: [id number]  Referring provider: Marcos Curry MD  Dx:   Encounter Diagnosis     ICD-10-CM    1. Right ankle pain, unspecified chronicity  M25.571       2. Right ankle instability  M25.371                      Subjective: Pt reports that he turned his body while sitting and got a sharp pain in his foot that stayed \"lit up\" until the next day.        Objective: See treatment diary below      Assessment: Tolerated treatment well. Patient demonstrated fatigue post treatment, exhibited good technique with therapeutic exercises, and would benefit from continued PT      Plan: Continue per plan of care.      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, Brostrom, peroneal tendons repair       Manuals         PROM  HK HK HK HK HK                                               Ther Ex         HEP review HK            Bike   10' 10' 10'        Step downs   L1  3x10 L1  3x10 L2  x30        Slant str    3x30\" 3x30\" 3x30\"        AROM DF/PF IV/EV     x30        Ankle circles      NV        SLS     X3  W/  hand hold                                                                                                                                                                    Gait Training           Brief HK HK HK                       Modalities          MH 15' 15' 15' 15'                                   "

## 2025-01-31 ENCOUNTER — OFFICE VISIT (OUTPATIENT)
Dept: PHYSICAL THERAPY | Facility: MEDICAL CENTER | Age: 60
End: 2025-01-31
Payer: COMMERCIAL

## 2025-01-31 DIAGNOSIS — M25.571 RIGHT ANKLE PAIN, UNSPECIFIED CHRONICITY: Primary | ICD-10-CM

## 2025-01-31 DIAGNOSIS — M25.371 RIGHT ANKLE INSTABILITY: ICD-10-CM

## 2025-01-31 PROCEDURE — 97110 THERAPEUTIC EXERCISES: CPT | Performed by: PHYSICAL THERAPIST

## 2025-01-31 PROCEDURE — 97140 MANUAL THERAPY 1/> REGIONS: CPT | Performed by: PHYSICAL THERAPIST

## 2025-01-31 PROCEDURE — 97112 NEUROMUSCULAR REEDUCATION: CPT | Performed by: PHYSICAL THERAPIST

## 2025-01-31 NOTE — PROGRESS NOTES
"Daily Note     Today's date: 2025  Patient name: Filipe Hill  : 1965  MRN: [id number]  Referring provider: Marcos Curry MD  Dx:   Encounter Diagnosis     ICD-10-CM    1. Right ankle pain, unspecified chronicity  M25.571       2. Right ankle instability  M25.371                      Subjective: Pt reports that his foot/ankle was fatigued following his visit yesterday.        Objective: See treatment diary below      Assessment: Tolerated treatment well. Patient demonstrated fatigue post treatment, exhibited good technique with therapeutic exercises, and would benefit from continued PT      Plan: Continue per plan of care.      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, Brostrom, peroneal tendons repair       Manuals        PROM  HK HK HK HK HK HK                                              Ther Ex        HEP review HK            Bike   10' 10' 10' 10'       Step downs   L1  3x10 L1  3x10 L2  x30 L2  x30       Slant str    3x30\" 3x30\" 3x30\" 3x30\"       AROM DF/PF IV/EV     x30 x30       Ankle circles      NV x30       SLS     X3  W/  hand hold x3       Standing DF      x30                                                                                                                                                      Gait Training           Brief HK HK HK                       Modalities        MH 15' 15' 15' 15' 15' 15'                                   "

## 2025-02-03 ENCOUNTER — OFFICE VISIT (OUTPATIENT)
Age: 60
End: 2025-02-03

## 2025-02-03 ENCOUNTER — OFFICE VISIT (OUTPATIENT)
Dept: PHYSICAL THERAPY | Facility: MEDICAL CENTER | Age: 60
End: 2025-02-03
Payer: COMMERCIAL

## 2025-02-03 VITALS
DIASTOLIC BLOOD PRESSURE: 84 MMHG | HEART RATE: 83 BPM | BODY MASS INDEX: 30.96 KG/M2 | SYSTOLIC BLOOD PRESSURE: 120 MMHG | HEIGHT: 75 IN | WEIGHT: 249 LBS

## 2025-02-03 DIAGNOSIS — M25.571 RIGHT ANKLE PAIN, UNSPECIFIED CHRONICITY: Primary | ICD-10-CM

## 2025-02-03 DIAGNOSIS — M25.371 RIGHT ANKLE INSTABILITY: ICD-10-CM

## 2025-02-03 DIAGNOSIS — Z02.89 ENCOUNTER FOR FEDERAL AVIATION ADMINISTRATION (FAA) EXAMINATION: Primary | ICD-10-CM

## 2025-02-03 PROCEDURE — 93000 ELECTROCARDIOGRAM COMPLETE: CPT | Performed by: FAMILY MEDICINE

## 2025-02-03 PROCEDURE — 97112 NEUROMUSCULAR REEDUCATION: CPT | Performed by: PHYSICAL THERAPIST

## 2025-02-03 PROCEDURE — 99499 UNLISTED E&M SERVICE: CPT | Performed by: FAMILY MEDICINE

## 2025-02-03 PROCEDURE — 97110 THERAPEUTIC EXERCISES: CPT | Performed by: PHYSICAL THERAPIST

## 2025-02-03 PROCEDURE — 97140 MANUAL THERAPY 1/> REGIONS: CPT | Performed by: PHYSICAL THERAPIST

## 2025-02-03 NOTE — PROGRESS NOTES
Chief Complaint   Patient presents with   • Physical Exam     [employer] 1st class, wear correctives, EKG

## 2025-02-03 NOTE — PROGRESS NOTES
"Daily Note     Today's date: 2/3/2025  Patient name: Filipe Hill  : 1965  MRN: [id number]  Referring provider: Marcos Curry MD  Dx:   Encounter Diagnosis     ICD-10-CM    1. Right ankle pain, unspecified chronicity  M25.571       2. Right ankle instability  M25.371                      Subjective: Pt reports that he is progressing.  He is getting some random sharp pain when he is walking.  When this happens he is usually feeling good, so it catches him off guard.        Objective: See treatment diary below      Assessment: Tolerated treatment well. Patient demonstrated fatigue post treatment, exhibited good technique with therapeutic exercises, and would benefit from continued PT      Plan: Continue per plan of care.      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, Brostrom, peroneal tendons repair       Manuals  2/3      PROM  HK HK HK HK HK HK HK                                             Ther Ex  2/3      HEP review HK            Bike   10' 10' 10' 10' 10'      Step downs   L1  3x10 L1  3x10 L2  x30 L2  x30 L2  x30      Slant str    3x30\" 3x30\" 3x30\" 3x30\" 3x30\"      AROM DF/PF IV/EV     x30 x30 x30      Ankle circles      NV x30 x30      SLS     X3  W/  hand hold x3 x3      Standing DF      x30 x30      T-band DF and PF       Red  x30                                                                                                                                        Gait Training           Brief HK HK HK                       Modalities  2/3       15' 15' 15' 15' 15' 15' 15'                                    "

## 2025-02-06 ENCOUNTER — OFFICE VISIT (OUTPATIENT)
Dept: PHYSICAL THERAPY | Facility: MEDICAL CENTER | Age: 60
End: 2025-02-06
Payer: COMMERCIAL

## 2025-02-06 DIAGNOSIS — M25.571 RIGHT ANKLE PAIN, UNSPECIFIED CHRONICITY: Primary | ICD-10-CM

## 2025-02-06 DIAGNOSIS — M25.371 RIGHT ANKLE INSTABILITY: ICD-10-CM

## 2025-02-06 PROCEDURE — 97110 THERAPEUTIC EXERCISES: CPT | Performed by: PHYSICAL THERAPIST

## 2025-02-06 PROCEDURE — 97112 NEUROMUSCULAR REEDUCATION: CPT | Performed by: PHYSICAL THERAPIST

## 2025-02-06 PROCEDURE — 97140 MANUAL THERAPY 1/> REGIONS: CPT | Performed by: PHYSICAL THERAPIST

## 2025-02-06 NOTE — PROGRESS NOTES
"Daily Note     Today's date: 2025  Patient name: Filipe Hill  : 1965  MRN: [id number]  Referring provider: Marcos Curry MD  Dx:   Encounter Diagnosis     ICD-10-CM    1. Right ankle pain, unspecified chronicity  M25.571       2. Right ankle instability  M25.371                      Subjective: Pt reports that he had a lot of pain yesterday, but doesn't remember doing anything to cause more pain.  Today he is feeling better and not having the same pain he had yesterday.        Objective: See treatment diary below      Assessment: Tolerated treatment well. Patient demonstrated fatigue post treatment, exhibited good technique with therapeutic exercises, and would benefit from continued PT      Plan: Continue per plan of care.      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, Brostrom, peroneal tendons repair       Manuals  2/3 2/6     PROM  HK HK HK HK HK HK HK HK                                            Ther Ex  2/3 2/6     HEP review HK            Bike   10' 10' 10' 10' 10' 10'     Step downs   L1  3x10 L1  3x10 L2  x30 L2  x30 L2  x30 L2  x30     Slant str    3x30\" 3x30\" 3x30\" 3x30\" 3x30\" 3x30\"     AROM DF/PF IV/EV     x30 x30 x30 x30     Ankle circles      NV x30 x30 x30     SLS     X3  W/  hand hold x3 x3 3x     Standing DF      x30 x30 x30     T-band DF and PF       Red  x30 Red  x30     SL LP        50#  x30                                                                                                                          Gait Training           Brief HK HK HK                       Modalities  2/3 2/6      15' 15' 15' 15' 15' 15' 15' 15'                                     "

## 2025-02-07 ENCOUNTER — OFFICE VISIT (OUTPATIENT)
Dept: PHYSICAL THERAPY | Facility: MEDICAL CENTER | Age: 60
End: 2025-02-07
Payer: COMMERCIAL

## 2025-02-07 DIAGNOSIS — M25.371 RIGHT ANKLE INSTABILITY: ICD-10-CM

## 2025-02-07 DIAGNOSIS — M25.571 RIGHT ANKLE PAIN, UNSPECIFIED CHRONICITY: Primary | ICD-10-CM

## 2025-02-07 PROCEDURE — 97110 THERAPEUTIC EXERCISES: CPT

## 2025-02-07 PROCEDURE — 97140 MANUAL THERAPY 1/> REGIONS: CPT

## 2025-02-07 PROCEDURE — 97112 NEUROMUSCULAR REEDUCATION: CPT

## 2025-02-07 NOTE — PROGRESS NOTES
"Daily Note     Today's date: 2025  Patient name: Filipe Hill  : 1965  MRN: [id number]  Referring provider: Marcos Curry MD  Dx:   Encounter Diagnosis     ICD-10-CM    1. Right ankle pain, unspecified chronicity  M25.571       2. Right ankle instability  M25.371                      Subjective: Pt reports that his ankle feels more painful today.        Objective: See treatment diary below      Assessment: Tolerated treatment well. Patient demonstrated fatigue post treatment, exhibited good technique with therapeutic exercises, and would benefit from continued PT  Some discomfort along lateral side of foot when performing the TB IR ex's added this visit.  Improved response when performed in a smaller range.       Plan: Continue per plan of care.      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, Brostrom, peroneal tendons repair       Manuals  2/3 2/6 2/7    PROM  HK HK HK HK HK HK HK HK KO                                           Ther Ex  2/3 2/6 2/7    HEP review HK            Bike   10' 10' 10' 10' 10' 10' 10'    Step downs   L1  3x10 L1  3x10 L2  x30 L2  x30 L2  x30 L2  x30 L2  x30    Slant str    3x30\" 3x30\" 3x30\" 3x30\" 3x30\" 3x30\" 3x30\"    AROM DF/PF IV/EV     x30 x30 x30 x30 x30    Ankle circles      NV x30 x30 x30 x30    SLS     X3  W/  hand hold x3 x3 3x 3x    Standing DF      x30 x30 x30 x30    T-band DF and PF       Red  x30 Red  x30 Red  X4 dir  x30    SL LP        50#  x30 80#  x30    HR's         x30                                                                                                            Gait Training           Brief HK HK HK                       Modalities  2/3 2/6 2/7    MH 15' 15' 15' 15' 15' 15' 15' 15' 15'                                      "

## 2025-02-10 ENCOUNTER — OFFICE VISIT (OUTPATIENT)
Dept: PHYSICAL THERAPY | Facility: MEDICAL CENTER | Age: 60
End: 2025-02-10
Payer: COMMERCIAL

## 2025-02-10 DIAGNOSIS — M25.371 RIGHT ANKLE INSTABILITY: ICD-10-CM

## 2025-02-10 DIAGNOSIS — M25.571 RIGHT ANKLE PAIN, UNSPECIFIED CHRONICITY: Primary | ICD-10-CM

## 2025-02-10 PROCEDURE — 97112 NEUROMUSCULAR REEDUCATION: CPT | Performed by: PHYSICAL THERAPIST

## 2025-02-10 PROCEDURE — 97140 MANUAL THERAPY 1/> REGIONS: CPT | Performed by: PHYSICAL THERAPIST

## 2025-02-10 PROCEDURE — 97110 THERAPEUTIC EXERCISES: CPT | Performed by: PHYSICAL THERAPIST

## 2025-02-10 NOTE — PROGRESS NOTES
"Daily Note     Today's date: 2/10/2025  Patient name: Filipe Hill  : 1965  MRN: [id number]  Referring provider: Marcos Curry MD  Dx:   Encounter Diagnosis     ICD-10-CM    1. Right ankle pain, unspecified chronicity  M25.571       2. Right ankle instability  M25.371                      Subjective: Pt reports that he walked 1/2 mile on Friday and a mile on Saturday and has been hurting since.  Pt notes that his calf is really sore and his ankle is swollen.       Objective: See treatment diary below    Advised relative rest today and then 1/2 mile walk tomorrow, with slower progression.    Assessment: Tolerated treatment fair. Patient demonstrated fatigue post treatment, exhibited good technique with therapeutic exercises, and would benefit from continued PT      Plan: Continue per plan of care.      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, Brostrom, peroneal tendons repair       Manuals  2/3 2/6 2/7    PROM  HK HK HK HK HK HK HK HK KO                                           Ther Ex  2/3 2/6 2/7    HEP review HK            Bike   10' 10' 10' 10' 10' 10' 10'    Step downs   L1  3x10 L1  3x10 L2  x30 L2  x30 L2  x30 L2  x30 L2  x30    Slant str    3x30\" 3x30\" 3x30\" 3x30\" 3x30\" 3x30\" 3x30\"    AROM DF/PF IV/EV     x30 x30 x30 x30 x30    Ankle circles      NV x30 x30 x30 x30    SLS     X3  W/  hand hold x3 x3 3x 3x    Standing DF      x30 x30 x30 x30    T-band DF and PF       Red  x30 Red  x30 Red  X4 dir  x30    SL LP        50#  x30 80#  x30    HR's         x30                                                                                                            Gait Training           Brief HK HK HK                       Modalities  2/3 2/6 2/7    MH 15' 15' 15' 15' 15' 15' 15' 15' 15'                                        "

## 2025-02-13 ENCOUNTER — OFFICE VISIT (OUTPATIENT)
Dept: PHYSICAL THERAPY | Facility: MEDICAL CENTER | Age: 60
End: 2025-02-13
Payer: COMMERCIAL

## 2025-02-13 DIAGNOSIS — M25.371 RIGHT ANKLE INSTABILITY: ICD-10-CM

## 2025-02-13 DIAGNOSIS — M25.571 RIGHT ANKLE PAIN, UNSPECIFIED CHRONICITY: Primary | ICD-10-CM

## 2025-02-13 PROCEDURE — 97140 MANUAL THERAPY 1/> REGIONS: CPT | Performed by: PHYSICAL THERAPIST

## 2025-02-13 PROCEDURE — 97110 THERAPEUTIC EXERCISES: CPT | Performed by: PHYSICAL THERAPIST

## 2025-02-13 PROCEDURE — 97112 NEUROMUSCULAR REEDUCATION: CPT | Performed by: PHYSICAL THERAPIST

## 2025-02-14 ENCOUNTER — OFFICE VISIT (OUTPATIENT)
Dept: PHYSICAL THERAPY | Facility: MEDICAL CENTER | Age: 60
End: 2025-02-14
Payer: COMMERCIAL

## 2025-02-14 DIAGNOSIS — M25.571 RIGHT ANKLE PAIN, UNSPECIFIED CHRONICITY: Primary | ICD-10-CM

## 2025-02-14 PROCEDURE — 97112 NEUROMUSCULAR REEDUCATION: CPT | Performed by: PHYSICAL THERAPIST

## 2025-02-14 PROCEDURE — 97140 MANUAL THERAPY 1/> REGIONS: CPT | Performed by: PHYSICAL THERAPIST

## 2025-02-14 PROCEDURE — 97110 THERAPEUTIC EXERCISES: CPT | Performed by: PHYSICAL THERAPIST

## 2025-02-14 NOTE — PROGRESS NOTES
"Daily Note     Today's date: 2025  Patient name: Filipe Hill  : 1965  MRN: [id number]  Referring provider: Marcos Curry MD  Dx:   Encounter Diagnosis     ICD-10-CM    1. Right ankle pain, unspecified chronicity  M25.571                      Subjective: Pt reports that he is generally feeling pretty good today.  He notes during the treatment session occasions of cramping in his calf that is relieved with stretching.        Objective: See treatment diary below      Assessment: Tolerated treatment well. Patient demonstrated fatigue post treatment, exhibited good technique with therapeutic exercises, and would benefit from continued PT.  Pt is progressing appropriately.        Plan: Continue per plan of care.      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, Brostrom, peroneal tendons repair       Manuals  2/3 2/6 2/ 214   PROM  HK HK HK HK HK HK HK HK KO HK HK                                             Ther Ex  2/3 2/6 2/7    HEP review HK             Bike   10' 10' 10' 10' 10' 10' 10' 10' 10'   Step downs   L1  3x10 L1  3x10 L2  x30 L2  x30 L2  x30 L2  x30 L2  x30 L2  x30 L2  x30   Slant str    3x30\" 3x30\" 3x30\" 3x30\" 3x30\" 3x30\" 3x30\" 3x30\" 3x30\"   AROM DF/PF IV/EV     x30 x30 x30 x30 x30 x30 x30   Ankle circles      NV x30 x30 x30 x30 x30 x30   SLS     X3  W/  hand hold x3 x3 3x 3x 3x 3x   Standing DF      x30 x30 x30 x30 x30 x30   T-band DF and PF       Red  x30 Red  x30 Red  X4 dir  x30 Red  X4 dir  x30 Green  X4 dir  x30   SL LP        50#  x30 80#  x30 80#  3x30 70#  3x30   HR's         x30 x30 x30   Wall slides            3x10                                                                                                     Gait Training            Brief HK HK HK                         Modalities 1/17 1/20 1/23 1/24 1/31 1/31 2/3 2/6 2/7 2/13 2/14    15' " 15' 15' 15' 15' 15' 15' 15' 15' 15' 15'

## 2025-02-17 ENCOUNTER — OFFICE VISIT (OUTPATIENT)
Dept: PHYSICAL THERAPY | Facility: MEDICAL CENTER | Age: 60
End: 2025-02-17
Payer: COMMERCIAL

## 2025-02-17 DIAGNOSIS — M25.571 RIGHT ANKLE PAIN, UNSPECIFIED CHRONICITY: Primary | ICD-10-CM

## 2025-02-17 DIAGNOSIS — M25.371 RIGHT ANKLE INSTABILITY: ICD-10-CM

## 2025-02-17 PROCEDURE — 97110 THERAPEUTIC EXERCISES: CPT

## 2025-02-17 PROCEDURE — 97112 NEUROMUSCULAR REEDUCATION: CPT

## 2025-02-17 PROCEDURE — 97140 MANUAL THERAPY 1/> REGIONS: CPT

## 2025-02-17 NOTE — PROGRESS NOTES
"Daily Note     Today's date: 2025  Patient name: Filipe Hill  : 1965  MRN: [id number]  Referring provider: Marcos Curry MD  Dx:   Encounter Diagnosis     ICD-10-CM    1. Right ankle pain, unspecified chronicity  M25.571       2. Right ankle instability  M25.371                      Subjective: Pt reports that his calf mm began to significantly tighten up when he was out for his 3/4 mile walk the other day.  Pt states that his foot feels relatively good when walking, but his calf mm continues to tighten up and then becomes painful when walking.      Objective: See treatment diary below      Assessment: Tolerated treatment well. Patient demonstrated fatigue post treatment, exhibited good technique with therapeutic exercises, and would benefit from continued PT  Pt's mobility is improving nicely and has made progress the past few weeks w/PROM.       Plan: Continue per plan of care.      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, Brostrom, peroneal tendons repair       Manuals              PROM  KO                                                       Ther Ex                            Bike 10'             Step downs L2  x30             Slant str  3x30\"             AROM DF/PF IV/EV x30             Ankle circles  x30             SLS 3x             Standing DF x30             T-band DF and PF Green  X4  x30             SL LP 70#  3x30             HR's x30             Wall slides  x30                                                                                                               Gait Training              Brief KO                           Modalities              MH 15'                                                        "

## 2025-02-19 ENCOUNTER — OFFICE VISIT (OUTPATIENT)
Dept: PHYSICAL THERAPY | Facility: MEDICAL CENTER | Age: 60
End: 2025-02-19
Payer: COMMERCIAL

## 2025-02-19 DIAGNOSIS — M25.571 RIGHT ANKLE PAIN, UNSPECIFIED CHRONICITY: Primary | ICD-10-CM

## 2025-02-19 DIAGNOSIS — M25.371 RIGHT ANKLE INSTABILITY: ICD-10-CM

## 2025-02-19 PROCEDURE — 97110 THERAPEUTIC EXERCISES: CPT

## 2025-02-19 PROCEDURE — 97112 NEUROMUSCULAR REEDUCATION: CPT

## 2025-02-19 PROCEDURE — 97140 MANUAL THERAPY 1/> REGIONS: CPT

## 2025-02-19 NOTE — PROGRESS NOTES
"Daily Note     Today's date: 2025  Patient name: Filipe Hill  : 1965  MRN: [id number]  Referring provider: Marcos Curry MD  Dx:   Encounter Diagnosis     ICD-10-CM    1. Right ankle pain, unspecified chronicity  M25.571       2. Right ankle instability  M25.371                      Subjective: Pt reports that he went for a 3/4 mile walk the other day and his foot and calf felt good and less tight.  Pt states that he felt as though he could have gone for another 3/4 of a mile.       Objective: See treatment diary below      Assessment: Tolerated treatment well. Patient demonstrated fatigue post treatment, exhibited good technique with therapeutic exercises, and would benefit from continued PT  Pt is making steady improvements in his mobility and strength.        Plan: Continue per plan of care.      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, Brostrom, peroneal tendons repair       Manuals             PROM  KO KO                                                      Ther Ex                           Bike 10' 10'            Step downs L2  x30 L2  x30            Slant str  3x30\" 3x30\"            AROM DF/PF IV/EV x30 x30            Ankle circles  x30 x30            SLS 3x 3x            Standing DF x30 x30            T-band DF and PF Green  X4  x30 Green  X4  x30            SL LP 70#  3x30 80#  3x10            HR's x30 x30            Wall slides  x30 x30                                                                                                              Gait Training              Brief KO                           Modalities              15' 15'  pre                                                         "

## 2025-02-20 ENCOUNTER — OFFICE VISIT (OUTPATIENT)
Dept: PHYSICAL THERAPY | Facility: MEDICAL CENTER | Age: 60
End: 2025-02-20
Payer: COMMERCIAL

## 2025-02-20 DIAGNOSIS — M25.571 RIGHT ANKLE PAIN, UNSPECIFIED CHRONICITY: Primary | ICD-10-CM

## 2025-02-20 DIAGNOSIS — M25.371 RIGHT ANKLE INSTABILITY: ICD-10-CM

## 2025-02-20 PROCEDURE — 97112 NEUROMUSCULAR REEDUCATION: CPT | Performed by: PHYSICAL THERAPIST

## 2025-02-20 PROCEDURE — 97110 THERAPEUTIC EXERCISES: CPT | Performed by: PHYSICAL THERAPIST

## 2025-02-20 PROCEDURE — 97140 MANUAL THERAPY 1/> REGIONS: CPT | Performed by: PHYSICAL THERAPIST

## 2025-02-20 NOTE — PROGRESS NOTES
"Daily Note     Today's date: 2025  Patient name: Filipe Hill  : 1965  MRN: [id number]  Referring provider: Marcos Curry MD  Dx:   Encounter Diagnosis     ICD-10-CM    1. Right ankle pain, unspecified chronicity  M25.571       2. Right ankle instability  M25.371                      Subjective: Pt reports that he is getting some swelling and burning in his incision.        Objective: See treatment diary below      Assessment: Tolerated treatment well. Patient demonstrated fatigue post treatment, exhibited good technique with therapeutic exercises, and would benefit from continued PT.  Pt is progressing appropriately in all areas.       Plan: Continue per plan of care.      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, Brostrom, peroneal tendons repair       Manuals            PROM  KO KO HK                                                     Ther Ex                          Bike 10' 10' 10'           Step downs L2  x30 L2  x30 L2  x30           Slant str  3x30\" 3x30\" 3x30\"           AROM DF/PF IV/EV x30 x30 x30           Ankle circles  x30 x30 x30           SLS 3x 3x 3x           Standing DF x30 x30 2x30           T-band DF and PF Green  X4  x30 Green  X4  x30 Green  4x30           SL LP 70#  3x30 80#  3x10 90#  3x10           HR's x30 x30 x30           Wall slides  x30 x30 x30           Lateral walking    5x20'                                                                                               Gait Training              Brief KO                           Modalities              15' 15'  pre                                                           "

## 2025-02-21 ENCOUNTER — APPOINTMENT (OUTPATIENT)
Dept: PHYSICAL THERAPY | Facility: MEDICAL CENTER | Age: 60
End: 2025-02-21
Payer: COMMERCIAL

## 2025-02-24 ENCOUNTER — OFFICE VISIT (OUTPATIENT)
Dept: PHYSICAL THERAPY | Facility: MEDICAL CENTER | Age: 60
End: 2025-02-24
Payer: COMMERCIAL

## 2025-02-24 DIAGNOSIS — M25.571 RIGHT ANKLE PAIN, UNSPECIFIED CHRONICITY: Primary | ICD-10-CM

## 2025-02-24 DIAGNOSIS — M25.371 RIGHT ANKLE INSTABILITY: ICD-10-CM

## 2025-02-24 PROCEDURE — 97140 MANUAL THERAPY 1/> REGIONS: CPT | Performed by: PHYSICAL THERAPIST

## 2025-02-24 PROCEDURE — 97110 THERAPEUTIC EXERCISES: CPT | Performed by: PHYSICAL THERAPIST

## 2025-02-24 PROCEDURE — 97112 NEUROMUSCULAR REEDUCATION: CPT | Performed by: PHYSICAL THERAPIST

## 2025-02-24 NOTE — PROGRESS NOTES
"Daily Note     Today's date: 2025  Patient name: Filipe Hill  : 1965  MRN: [id number]  Referring provider: Marcos Curry MD  Dx:   Encounter Diagnosis     ICD-10-CM    1. Right ankle pain, unspecified chronicity  M25.571       2. Right ankle instability  M25.371                      Subjective: Pt reports that he was able to walk 1/3 more steps yesterday than he had previously been doing recently.        Objective: See treatment diary below      Assessment: Tolerated treatment well. Patient demonstrated fatigue post treatment, exhibited good technique with therapeutic exercises, and would benefit from continued PT      Plan: Continue per plan of care.      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, Brostrom, peroneal tendons repair       Manuals            PROM  KO KO HK                                                     Ther Ex                          Bike 10' 10' 10'           Step downs L2  x30 L2  x30 L2  x30           Slant str  3x30\" 3x30\" 3x30\"           AROM DF/PF IV/EV x30 x30 x30           Ankle circles  x30 x30 x30           SLS 3x 3x 3x           Standing DF x30 x30 2x30           T-band DF and PF Green  X4  x30 Green  X4  x30 Green  4x30           SL LP 70#  3x30 80#  3x10 90#  3x10           HR's x30 x30 x30           Wall slides  x30 x30 x30           Lateral walking    5x20'                                                                                               Gait Training              Brief KO                           Modalities              15' 15'  pre                                                             "

## 2025-02-27 ENCOUNTER — APPOINTMENT (OUTPATIENT)
Dept: PHYSICAL THERAPY | Facility: MEDICAL CENTER | Age: 60
End: 2025-02-27
Payer: COMMERCIAL

## 2025-02-28 ENCOUNTER — OFFICE VISIT (OUTPATIENT)
Dept: PHYSICAL THERAPY | Facility: MEDICAL CENTER | Age: 60
End: 2025-02-28
Payer: COMMERCIAL

## 2025-02-28 DIAGNOSIS — M25.571 RIGHT ANKLE PAIN, UNSPECIFIED CHRONICITY: Primary | ICD-10-CM

## 2025-02-28 DIAGNOSIS — M25.371 RIGHT ANKLE INSTABILITY: ICD-10-CM

## 2025-02-28 PROCEDURE — 97140 MANUAL THERAPY 1/> REGIONS: CPT | Performed by: PHYSICAL THERAPIST

## 2025-02-28 PROCEDURE — 97110 THERAPEUTIC EXERCISES: CPT | Performed by: PHYSICAL THERAPIST

## 2025-02-28 PROCEDURE — 97112 NEUROMUSCULAR REEDUCATION: CPT | Performed by: PHYSICAL THERAPIST

## 2025-02-28 NOTE — PROGRESS NOTES
"Daily Note     Today's date: 2025  Patient name: Filipe Hill  : 1965  MRN: [id number]  Referring provider: Marcos Curry MD  Dx:   Encounter Diagnosis     ICD-10-CM    1. Right ankle pain, unspecified chronicity  M25.571       2. Right ankle instability  M25.371                      Subjective: Pt reports that he initially did well on his trip to [place], but the next day his leg and ankle swelled and he did not feel as good for the rest of the trip.  His soreness and swelling are resolving, but are still present.        Objective: See treatment diary below    Increased swelling is likely the result of flying due to altitude and increased walking.      Assessment: Tolerated treatment fair. Patient demonstrated fatigue post treatment, exhibited good technique with therapeutic exercises, and would benefit from continued PT      Plan: Continue per plan of care.      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, Brostrom, peroneal tendons repair       Manuals           PROM  KO KO HK HK          Jt mobs     HK  All planes          MFR    HK                        Ther Ex                         Bike 10' 10' 10' 10'          Step downs L2  x30 L2  x30 L2  x30 L1  x30          Slant str  3x30\" 3x30\" 3x30\" 3x30\"          AROM DF/PF IV/EV x30 x30 x30 x30          Ankle circles  x30 x30 x30 x30          SLS 3x 3x 3x           Standing DF x30 x30 2x30 2x30          T-band DF and PF Green  X4  x30 Green  X4  x30 Green  4x30 Green  4x30          SL LP 70#  3x30 80#  3x10 90#  3x10 NP          HR's x30 x30 x30 NP          Wall slides  x30 x30 x30 NP          Lateral walking    5x20' 5x20'                                                                                              Gait Training              Brief KO                           Modalities            15' 15'  pre 15' 15'                    "

## 2025-03-03 ENCOUNTER — OFFICE VISIT (OUTPATIENT)
Dept: PHYSICAL THERAPY | Facility: MEDICAL CENTER | Age: 60
End: 2025-03-03
Payer: COMMERCIAL

## 2025-03-03 DIAGNOSIS — M25.371 RIGHT ANKLE INSTABILITY: ICD-10-CM

## 2025-03-03 DIAGNOSIS — M25.571 RIGHT ANKLE PAIN, UNSPECIFIED CHRONICITY: Primary | ICD-10-CM

## 2025-03-03 PROCEDURE — 97110 THERAPEUTIC EXERCISES: CPT | Performed by: PHYSICAL THERAPIST

## 2025-03-03 PROCEDURE — 97140 MANUAL THERAPY 1/> REGIONS: CPT | Performed by: PHYSICAL THERAPIST

## 2025-03-03 PROCEDURE — 97112 NEUROMUSCULAR REEDUCATION: CPT | Performed by: PHYSICAL THERAPIST

## 2025-03-03 NOTE — PROGRESS NOTES
"Daily Note     Today's date: 3/3/2025  Patient name: Filipe Hill  : 1965  MRN: [id number]  Referring provider: Marcos Curry MD  Dx:   Encounter Diagnosis     ICD-10-CM    1. Right ankle pain, unspecified chronicity  M25.571       2. Right ankle instability  M25.371                      Subjective: Pt reports that he had still had a lot of swelling on Friday, but it resolved over the weekend and he was able to walk a mile.  Pt reports that he was also limited by calf soreness and that also seems better.        Objective: See treatment diary below      Assessment: Tolerated treatment well. Patient demonstrated fatigue post treatment, exhibited good technique with therapeutic exercises, and would benefit from continued PT      Plan: Continue per plan of care.      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, Brostrom, peroneal tendons repair       Manuals 2/17 2/19 2/20 2/28 3/3         PROM  KO KO HK HK HK         Jt mobs     HK  All planes HK         MFR gastroc    HK HK                       Ther Ex 2/17 2/19 2/20 2/28 3/3                       Bike 10' 10' 10' 10' 10'         Step downs L2  x30 L2  x30 L2  x30 L1  x30 L1  x30         Slant str  3x30\" 3x30\" 3x30\" 3x30\" 3x30\"         AROM DF/PF IV/EV x30 x30 x30 x30 x30         Ankle circles  x30 x30 x30 x30 x30         SLS 3x 3x 3x NP 3x         Standing DF x30 x30 2x30 2x30 2x30         T-band DF and PF Green  X4  x30 Green  X4  x30 Green  4x30 Green  4x30 Green  4x30         SL LP 70#  3x30 80#  3x10 90#  3x10 NP 90#  3x10         HR's x30 x30 x30 NP x30         Wall slides  x30 x30 x30 NP x30         Lateral walking    5x20' 5x20' 5x20'                                                                                             Gait Training              Brief KO                           Modalities  3/3          15' 15'  pre 15' 15' 15'                     "

## 2025-03-07 ENCOUNTER — HOSPITAL ENCOUNTER (OUTPATIENT)
Dept: NON INVASIVE DIAGNOSTICS | Facility: HOSPITAL | Age: 60
Discharge: HOME | End: 2025-03-07
Payer: COMMERCIAL

## 2025-03-07 ENCOUNTER — OFFICE VISIT (OUTPATIENT)
Dept: PHYSICAL THERAPY | Facility: MEDICAL CENTER | Age: 60
End: 2025-03-07
Payer: COMMERCIAL

## 2025-03-07 DIAGNOSIS — M25.371 RIGHT ANKLE INSTABILITY: ICD-10-CM

## 2025-03-07 DIAGNOSIS — Q66.10 CONGENITAL TALIPES CALCANEOVARUS, UNSPECIFIED FOOT: ICD-10-CM

## 2025-03-07 DIAGNOSIS — M25.571 RIGHT ANKLE PAIN, UNSPECIFIED CHRONICITY: Primary | ICD-10-CM

## 2025-03-07 PROCEDURE — 93971 EXTREMITY STUDY: CPT

## 2025-03-07 PROCEDURE — 93971 EXTREMITY STUDY: CPT | Performed by: SURGERY

## 2025-03-07 PROCEDURE — 97110 THERAPEUTIC EXERCISES: CPT | Performed by: PHYSICAL THERAPIST

## 2025-03-07 PROCEDURE — 97112 NEUROMUSCULAR REEDUCATION: CPT | Performed by: PHYSICAL THERAPIST

## 2025-03-07 PROCEDURE — 97140 MANUAL THERAPY 1/> REGIONS: CPT | Performed by: PHYSICAL THERAPIST

## 2025-03-07 NOTE — PROGRESS NOTES
"Daily Note     Today's date: 3/7/2025  Patient name: Filipe Hill  : 1965  MRN: [id number]  Referring provider: Marcos Curry MD  Dx:   Encounter Diagnosis     ICD-10-CM    1. Right ankle pain, unspecified chronicity  M25.571       2. Right ankle instability  M25.371                      Subjective: Pt reports that he had a really good visit with his surgeon and the surgoen was pleased with his progress.        Objective: See treatment diary below      Assessment: Tolerated treatment well. Patient demonstrated fatigue post treatment, exhibited good technique with therapeutic exercises, and would benefit from continued PT      Plan: Continue per plan of care.      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, Brostrom, peroneal tendons repair       Manuals 2/17 2/19 2/20 2/28 3/3 3/7        PROM  KO KO HK HK HK         Jt mobs     HK  All planes HK         MFR gastroc    HK HK                       Ther Ex 2/17 2/19 2/20 2/28 3/3 3/7                      Bike 10' 10' 10' 10' 10' 10'        Step downs L2  x30 L2  x30 L2  x30 L1  x30 L1  x30 L2  x30        Slant str  3x30\" 3x30\" 3x30\" 3x30\" 3x30\" 3x30\"        AROM DF/PF IV/EV x30 x30 x30 x30 x30 x30        Ankle circles  x30 x30 x30 x30 x30 x30        SLS 3x 3x 3x NP 3x 3x        Standing DF x30 x30 2x30 2x30 2x30 2x30        T-band DF and PF Green  X4  x30 Green  X4  x30 Green  4x30 Green  4x30 Green  4x30 Green  4x30        SL LP 70#  3x30 80#  3x10 90#  3x10 NP 90#  3x10 90#  3x10        HR's x30 x30 x30 NP x30 x30        Wall slides  x30 x30 x30 NP x30 x30        Lateral walking    5x20' 5x20' 5x20' 5x20'                                                                                            Gait Training              Brief KO                           Modalities 2/17 2/19 2/20 2/28 3/3 3/7        MH 15' 15'  pre 15' 15' 15' 15'                                                               "

## 2025-03-10 ENCOUNTER — OFFICE VISIT (OUTPATIENT)
Dept: PHYSICAL THERAPY | Facility: MEDICAL CENTER | Age: 60
End: 2025-03-10
Payer: COMMERCIAL

## 2025-03-10 DIAGNOSIS — M25.371 RIGHT ANKLE INSTABILITY: ICD-10-CM

## 2025-03-10 DIAGNOSIS — M25.571 RIGHT ANKLE PAIN, UNSPECIFIED CHRONICITY: Primary | ICD-10-CM

## 2025-03-10 PROCEDURE — 97110 THERAPEUTIC EXERCISES: CPT | Performed by: PHYSICAL THERAPIST

## 2025-03-10 PROCEDURE — 97140 MANUAL THERAPY 1/> REGIONS: CPT | Performed by: PHYSICAL THERAPIST

## 2025-03-10 PROCEDURE — 97112 NEUROMUSCULAR REEDUCATION: CPT | Performed by: PHYSICAL THERAPIST

## 2025-03-10 NOTE — PROGRESS NOTES
"Daily Note     Today's date: 3/10/2025  Patient name: Filipe Hill  : 1965  MRN: [id number]  Referring provider: Marcos Curry MD  Dx:   Encounter Diagnosis     ICD-10-CM    1. Right ankle pain, unspecified chronicity  M25.571       2. Right ankle instability  M25.371                      Subjective: Pt had more discomfort on Saturday, but he flew Wednesday and Thursday and had PT and went to the gym on Friday.  He was able to walk 1.5 miles over the weekend.       Objective: See treatment diary below      Assessment: Tolerated treatment well. Patient demonstrated fatigue post treatment, exhibited good technique with therapeutic exercises, and would benefit from continued PT.  Pt is making steady progress in all areas.        Plan: Continue per plan of care.      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, Brostrom, peroneal tendons repair       Manuals 2/17 2/19 2/20 2/28 3/3 3/7 3/10       PROM  KO KO HK HK HK HK HK       Jt mobs     HK  All planes HK HK HK       MFR gastroc    HK HK HK HK                     Ther Ex 2/17 2/19 2/20 2/28 3/3 3/7 3/10                     Bike 10' 10' 10' 10' 10' 10' 10'       Step downs L2  x30 L2  x30 L2  x30 L1  x30 L1  x30 L2  x30 L3  x30       Slant str  3x30\" 3x30\" 3x30\" 3x30\" 3x30\" 3x30\" 3x30\"       AROM DF/PF IV/EV x30 x30 x30 x30 x30 x30 x30       Ankle circles  x30 x30 x30 x30 x30 x30 x30       SLS 3x 3x 3x NP 3x 3x 3x       Standing DF x30 x30 2x30 2x30 2x30 2x30 2x30       T-band DF and PF Green  X4  x30 Green  X4  x30 Green  4x30 Green  4x30 Green  4x30 Green  4x30 Blue  4x30       SL LP 70#  3x30 80#  3x10 90#  3x10 NP 90#  3x10 90#  3x10 90#  3x10       HR's x30 x30 x30 NP x30 x30 x30       Wall slides  x30 x30 x30 NP x30 x30 x30       Lateral walking    5x20' 5x20' 5x20' 5x20' 5x20'                                                                                           Gait Training            "   Brief KO                           Modalities 2/17 2/19 2/20 2/28 3/3 3/7         15' 15'  pre 15' 15' 15' 15'

## 2025-03-13 ENCOUNTER — OFFICE VISIT (OUTPATIENT)
Dept: PHYSICAL THERAPY | Facility: MEDICAL CENTER | Age: 60
End: 2025-03-13
Payer: COMMERCIAL

## 2025-03-13 DIAGNOSIS — M25.371 RIGHT ANKLE INSTABILITY: ICD-10-CM

## 2025-03-13 DIAGNOSIS — M25.571 RIGHT ANKLE PAIN, UNSPECIFIED CHRONICITY: Primary | ICD-10-CM

## 2025-03-13 PROCEDURE — 97110 THERAPEUTIC EXERCISES: CPT

## 2025-03-13 PROCEDURE — 97112 NEUROMUSCULAR REEDUCATION: CPT

## 2025-03-13 PROCEDURE — 97140 MANUAL THERAPY 1/> REGIONS: CPT

## 2025-03-13 NOTE — PROGRESS NOTES
"Daily Note     Today's date: 3/13/2025  Patient name: Filipe Hill  : 1965  MRN: [id number]  Referring provider: Marcos Curry MD  Dx:   Encounter Diagnosis     ICD-10-CM    1. Right ankle pain, unspecified chronicity  M25.571       2. Right ankle instability  M25.371                      Subjective: Pt reports that he was able to jog for approx 15 steps without any increased pain.  Pt feels as though the issue is in his foot now and his mobility, especially with push off when walking.  Pt also notes improved geraldo when out for his walks.        Objective: See treatment diary below      Assessment: Tolerated treatment well. Patient demonstrated fatigue post treatment, exhibited good technique with therapeutic exercises, and would benefit from continued PT  Pt has made good progress in his mobility and strength, especially the past week.       Plan: Continue per plan of care.      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, Brostrom, peroneal tendons repair       Manuals 2/17 2/19 2/20 2/28 3/3 3/7 3/10 3/13      PROM  KO KO HK HK HK HK HK KO      Jt mobs     HK  All planes HK HK HK       MFR gastroc    HK HK HK HK KO                    Ther Ex 2/17 2/19 2/20 2/28 3/3 3/7 3/10 3/13                    Bike 10' 10' 10' 10' 10' 10' 10' 10'      Step downs L2  x30 L2  x30 L2  x30 L1  x30 L1  x30 L2  x30 L3  x30 L3  x30      Slant str  3x30\" 3x30\" 3x30\" 3x30\" 3x30\" 3x30\" 3x30\" 3x30\"      AROM DF/PF IV/EV x30 x30 x30 x30 x30 x30 x30 x30      Ankle circles  x30 x30 x30 x30 x30 x30 x30 x30      SLS 3x 3x 3x NP 3x 3x 3x AE  3x      Standing DF x30 x30 2x30 2x30 2x30 2x30 2x30 2x30      T-band DF and PF Green  X4  x30 Green  X4  x30 Green  4x30 Green  4x30 Green  4x30 Green  4x30 Blue  4x30 Blue  4x30      SL LP 70#  3x30 80#  3x10 90#  3x10 NP 90#  3x10 90#  3x10 90#  3x10 90#  3x15      HR's x30 x30 x30 NP x30 x30 x30 x30      Wall slides  x30 x30 x30 NP x30 " x30 x30 x30      Lateral walking    5x20' 5x20' 5x20' 5x20' 5x20' 5x20'      Lunges -  Lat         3x10                                                                            Gait Training 2/17             Brief KO                           Modalities 2/17 2/19 2/20 2/28 3/3 3/7  3/13       15' 15'  pre 15' 15' 15' 15'  15'

## 2025-03-14 ENCOUNTER — OFFICE VISIT (OUTPATIENT)
Dept: PHYSICAL THERAPY | Facility: MEDICAL CENTER | Age: 60
End: 2025-03-14
Payer: COMMERCIAL

## 2025-03-14 DIAGNOSIS — M25.371 RIGHT ANKLE INSTABILITY: ICD-10-CM

## 2025-03-14 DIAGNOSIS — M25.571 RIGHT ANKLE PAIN, UNSPECIFIED CHRONICITY: Primary | ICD-10-CM

## 2025-03-14 PROCEDURE — 97140 MANUAL THERAPY 1/> REGIONS: CPT

## 2025-03-14 PROCEDURE — 97110 THERAPEUTIC EXERCISES: CPT

## 2025-03-14 PROCEDURE — 97112 NEUROMUSCULAR REEDUCATION: CPT

## 2025-03-14 NOTE — PROGRESS NOTES
"Daily Note     Today's date: 3/14/2025  Patient name: Filipe Hill  : 1965  MRN: [id number]  Referring provider: Marcos Curry MD  Dx:   Encounter Diagnosis     ICD-10-CM    1. Right ankle pain, unspecified chronicity  M25.571       2. Right ankle instability  M25.371                      Subjective: Pt reports that he felt fine after yesterdays PT session and then performed his exercise routine again at home later that day.  Pt notes that later on he experienced quite a bit of pain and swelling in his ankle.       Objective: See treatment diary below      Assessment: Tolerated treatment well. Patient demonstrated fatigue post treatment, exhibited good technique with therapeutic exercises, and would benefit from continued PT  Pt notes decreased sx's post manuals, but notes numbness in foot when performing lateral shuffle and lateral lunges.       Plan: Continue per plan of care.      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, Brostrom, peroneal tendons repair       Manuals 2/17 2/19 2/20 2/28 3/3 3/7 3/10 3/13 3/14     PROM  KO KO HK HK HK HK HK KO KO     Jt mobs     HK  All planes HK HK HK       MFR gastroc    HK HK HK HK KO KO                   Ther Ex 2/17 2/19 2/20 2/28 3/3 3/7 3/10 3/13 3/14                   Bike 10' 10' 10' 10' 10' 10' 10' 10' 10'     Step downs L2  x30 L2  x30 L2  x30 L1  x30 L1  x30 L2  x30 L3  x30 L3  x30 L3  x30     Slant str  3x30\" 3x30\" 3x30\" 3x30\" 3x30\" 3x30\" 3x30\" 3x30\" 3x30\"     AROM DF/PF IV/EV x30 x30 x30 x30 x30 x30 x30 x30 x30     Ankle circles  x30 x30 x30 x30 x30 x30 x30 x30 x30     SLS 3x 3x 3x NP 3x 3x 3x AE  3x AE  3x     Standing DF x30 x30 2x30 2x30 2x30 2x30 2x30 2x30 2x30     T-band DF and PF Green  X4  x30 Green  X4  x30 Green  4x30 Green  4x30 Green  4x30 Green  4x30 Blue  4x30 Blue  4x30 Blue  4x30     SL LP 70#  3x30 80#  3x10 90#  3x10 NP 90#  3x10 90#  3x10 90#  3x10 90#  3x15 90#  3x15     HR's x30 " x30 x30 NP x30 x30 x30 x30 x30     Wall slides  x30 x30 x30 NP x30 x30 x30 x30 x30     Lateral walking and  Lateral side  shuffle   5x20' 5x20' 5x20' 5x20' 5x20' 5x20' 5x20'  And  4x20'     Lateral lunges - stationary        3x10 x10                                                                           Gait Training 2/17             Brief KO                           Modalities 2/17 2/19 2/20 2/28 3/3 3/7  3/13 3/14      15' 15'  pre 15' 15' 15' 15'  15' 15'

## 2025-03-17 ENCOUNTER — OFFICE VISIT (OUTPATIENT)
Dept: PHYSICAL THERAPY | Facility: MEDICAL CENTER | Age: 60
End: 2025-03-17
Payer: COMMERCIAL

## 2025-03-17 DIAGNOSIS — M25.571 RIGHT ANKLE PAIN, UNSPECIFIED CHRONICITY: Primary | ICD-10-CM

## 2025-03-17 DIAGNOSIS — M25.371 RIGHT ANKLE INSTABILITY: ICD-10-CM

## 2025-03-17 PROCEDURE — 97112 NEUROMUSCULAR REEDUCATION: CPT | Performed by: PHYSICAL THERAPIST

## 2025-03-17 PROCEDURE — 97110 THERAPEUTIC EXERCISES: CPT | Performed by: PHYSICAL THERAPIST

## 2025-03-17 PROCEDURE — 97140 MANUAL THERAPY 1/> REGIONS: CPT | Performed by: PHYSICAL THERAPIST

## 2025-03-17 NOTE — PROGRESS NOTES
"Daily Note     Today's date: 3/17/2025  Patient name: Filipe Hill  : 1965  MRN: [id number]  Referring provider: Marcos Curry MD  Dx:   Encounter Diagnosis     ICD-10-CM    1. Right ankle pain, unspecified chronicity  M25.571       2. Right ankle instability  M25.371                      Subjective: Pt reports that he ramped up his activity level over the weekend and only has minor soreness.          Objective: See treatment diary below      Assessment: Tolerated treatment well. Patient demonstrated fatigue post treatment, exhibited good technique with therapeutic exercises, and would benefit from continued PT      Plan: Continue per plan of care.      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, Brostrom, peroneal tendons repair       Manuals 2/17 2/19 2/20 2/28 3/3 3/7 3/10 3/13 3/14 3/17    PROM  KO KO HK HK HK HK HK KO KO HK    Jt mobs     HK  All planes HK HK HK       MFR gastroc    HK HK HK HK KO KO HK                  Ther Ex 2/17 2/19 2/20 2/28 3/3 3/7 3/10 3/13 3/14 3/17                  Bike 10' 10' 10' 10' 10' 10' 10' 10' 10' 10'    Step downs L2  x30 L2  x30 L2  x30 L1  x30 L1  x30 L2  x30 L3  x30 L3  x30 L3  x30 L3  x30    Slant str  3x30\" 3x30\" 3x30\" 3x30\" 3x30\" 3x30\" 3x30\" 3x30\" 3x30\" 3x30\"    AROM DF/PF IV/EV x30 x30 x30 x30 x30 x30 x30 x30 x30 x30    Ankle circles  x30 x30 x30 x30 x30 x30 x30 x30 x30 x30    SLS 3x 3x 3x NP 3x 3x 3x AE  3x AE  3x AE  x3    Standing DF x30 x30 2x30 2x30 2x30 2x30 2x30 2x30 2x30 2x30    T-band DF and PF Green  X4  x30 Green  X4  x30 Green  4x30 Green  4x30 Green  4x30 Green  4x30 Blue  4x30 Blue  4x30 Blue  4x30 Blue  4x30    SL LP 70#  3x30 80#  3x10 90#  3x10 NP 90#  3x10 90#  3x10 90#  3x10 90#  3x15 90#  3x15 90#  3x15    HR's x30 x30 x30 NP x30 x30 x30 x30 x30 x30    Wall slides  x30 x30 x30 NP x30 x30 x30 x30 x30 x30    Lateral walking and  Lateral side  shuffle   5x20' 5x20' 5x20' 5x20' 5x20' 5x20' " 5x20'  And  4x20' 5x20'    Lateral lunges - stationary        3x10 x10 3x10    Sled push          50#  5x                                                            Gait Training 2/17             Brief KO                           Modalities 2/17 2/19 2/20 2/28 3/3 3/7  3/13 3/14      15' 15'  pre 15' 15' 15' 15'  15' 15'

## 2025-03-19 ENCOUNTER — OFFICE VISIT (OUTPATIENT)
Dept: PHYSICAL THERAPY | Facility: MEDICAL CENTER | Age: 60
End: 2025-03-19
Payer: COMMERCIAL

## 2025-03-19 DIAGNOSIS — M25.571 RIGHT ANKLE PAIN, UNSPECIFIED CHRONICITY: Primary | ICD-10-CM

## 2025-03-19 DIAGNOSIS — M25.371 RIGHT ANKLE INSTABILITY: ICD-10-CM

## 2025-03-19 PROCEDURE — 97140 MANUAL THERAPY 1/> REGIONS: CPT

## 2025-03-19 PROCEDURE — 97110 THERAPEUTIC EXERCISES: CPT

## 2025-03-19 PROCEDURE — 97112 NEUROMUSCULAR REEDUCATION: CPT

## 2025-03-19 NOTE — PROGRESS NOTES
"Daily Note     Today's date: 3/19/2025  Patient name: Filipe Hill  : 1965  MRN: [id number]  Referring provider: Marcos Curry MD  Dx:   Encounter Diagnosis     ICD-10-CM    1. Right ankle pain, unspecified chronicity  M25.571       2. Right ankle instability  M25.371                      Subjective: Pt reports that his ankle has been sore with increased activities and progressions made in PT and at the gym.       Objective: See treatment diary below      Assessment: Tolerated treatment well. Patient demonstrated fatigue post treatment, exhibited good technique with therapeutic exercises, and would benefit from continued PT  Pt is making good progress and is showing improvement in his ankle mobility over the past few weeks.       Plan: Continue per plan of care.      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, Brostrom, peroneal tendons repair       Manuals 2/17 2/19 2/20 2/28 3/3 3/7 3/10 3/13 3/14 3/17 3/19   PROM  KO KO HK HK HK HK HK KO KO HK KO   Jt mobs     HK  All planes HK HK HK       MFR gastroc    HK HK HK HK KO KO HK KO                 Ther Ex 2/17 2/19 2/20 2/28 3/3 3/7 3/10 3/13 3/14 3/17 3/19                 Bike 10' 10' 10' 10' 10' 10' 10' 10' 10' 10' 10'   Step downs L2  x30 L2  x30 L2  x30 L1  x30 L1  x30 L2  x30 L3  x30 L3  x30 L3  x30 L3  x30 L3  x30   Slant str  3x30\" 3x30\" 3x30\" 3x30\" 3x30\" 3x30\" 3x30\" 3x30\" 3x30\" 3x30\" 3x30\"   AROM DF/PF IV/EV x30 x30 x30 x30 x30 x30 x30 x30 x30 x30 x30   Ankle circles  x30 x30 x30 x30 x30 x30 x30 x30 x30 x30 x30   SLS 3x 3x 3x NP 3x 3x 3x AE  3x AE  3x AE  x3 AE  x3   Standing DF x30 x30 2x30 2x30 2x30 2x30 2x30 2x30 2x30 2x30 2x30   T-band DF and PF Green  X4  x30 Green  X4  x30 Green  4x30 Green  4x30 Green  4x30 Green  4x30 Blue  4x30 Blue  4x30 Blue  4x30 Blue  4x30 Blue  4x30   SL LP 70#  3x30 80#  3x10 90#  3x10 NP 90#  3x10 90#  3x10 90#  3x10 90#  3x15 90#  3x15 90#  3x15 90#  3x15   HR's " x30 x30 x30 NP x30 x30 x30 x30 x30 x30 x30   Wall slides  x30 x30 x30 NP x30 x30 x30 x30 x30 x30 x30   Lateral walking and  Lateral side  shuffle   5x20' 5x20' 5x20' 5x20' 5x20' 5x20' 5x20'  And  4x20' 5x20' 5x20'   Lateral lunges - stationary        3x10 x10 3x10 3x10   Sled push          50#  5x 75#  5x                                                           Gait Training 2/17             Brief KO                           Modalities 2/17 2/19 2/20 2/28 3/3 3/7  3/13 3/14  3/19   MH 15' 15'  pre 15' 15' 15' 15'  15' 15'  15'

## 2025-03-24 ENCOUNTER — OFFICE VISIT (OUTPATIENT)
Dept: PHYSICAL THERAPY | Facility: MEDICAL CENTER | Age: 60
End: 2025-03-24
Payer: COMMERCIAL

## 2025-03-24 DIAGNOSIS — M25.371 RIGHT ANKLE INSTABILITY: ICD-10-CM

## 2025-03-24 DIAGNOSIS — M25.571 RIGHT ANKLE PAIN, UNSPECIFIED CHRONICITY: Primary | ICD-10-CM

## 2025-03-24 PROCEDURE — 97112 NEUROMUSCULAR REEDUCATION: CPT | Performed by: PHYSICAL THERAPIST

## 2025-03-24 PROCEDURE — 97140 MANUAL THERAPY 1/> REGIONS: CPT | Performed by: PHYSICAL THERAPIST

## 2025-03-24 PROCEDURE — 97110 THERAPEUTIC EXERCISES: CPT | Performed by: PHYSICAL THERAPIST

## 2025-03-24 NOTE — PROGRESS NOTES
"Daily Note     Today's date: 3/24/2025  Patient name: Filipe Hill  : 1965  MRN: [id number]  Referring provider: Marcos Curry MD  Dx:   Encounter Diagnosis     ICD-10-CM    1. Right ankle pain, unspecified chronicity  M25.571       2. Right ankle instability  M25.371                      Subjective: Pt reports that his trip went well, but there are still things to work on including calf strength.        Objective: See treatment diary below      Assessment: Tolerated treatment well. Patient demonstrated fatigue post treatment, exhibited good technique with therapeutic exercises, and would benefit from continued PT      Plan: Continue per plan of care.      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, Brostrom, peroneal tendons repair       Manuals 3/24   PROM  KO   Jt mobs     MFR gastroc KO       Ther Ex 3/24       Bike 10'   Step downs L3  x30   Slant str  3x30\"   AROM DF/PF IV/EV x30   Ankle circles  x30   SLS AE  x3   Standing DF 2x30   T-band DF and PF Blue  4x30   SL LP 90#  3x15   HR's x30   Wall slides  x30   Lateral walking and  Lateral side  shuffle 5x20'   Lateral lunges - stationary 3x10   Sled push 100#  5x   BAPS DF/PF and IV/EV L1  x30               Gait Training    Brief        Modalities 3/24   MH 15'                                                            "

## 2025-03-28 ENCOUNTER — OFFICE VISIT (OUTPATIENT)
Dept: PHYSICAL THERAPY | Facility: MEDICAL CENTER | Age: 60
End: 2025-03-28
Payer: COMMERCIAL

## 2025-03-28 DIAGNOSIS — M25.371 RIGHT ANKLE INSTABILITY: ICD-10-CM

## 2025-03-28 DIAGNOSIS — M25.571 RIGHT ANKLE PAIN, UNSPECIFIED CHRONICITY: Primary | ICD-10-CM

## 2025-03-28 PROCEDURE — 97110 THERAPEUTIC EXERCISES: CPT | Performed by: PHYSICAL THERAPIST

## 2025-03-28 PROCEDURE — 97140 MANUAL THERAPY 1/> REGIONS: CPT | Performed by: PHYSICAL THERAPIST

## 2025-03-28 PROCEDURE — 97112 NEUROMUSCULAR REEDUCATION: CPT | Performed by: PHYSICAL THERAPIST

## 2025-03-28 NOTE — PROGRESS NOTES
"Daily Note     Today's date: 3/28/2025  Patient name: Filipe Hill  : 1965  MRN: [id number]  Referring provider: Marcos Curry MD  Dx:   Encounter Diagnosis     ICD-10-CM    1. Right ankle pain, unspecified chronicity  M25.571       2. Right ankle instability  M25.371                      Subjective: Pt reports that his foot and ankle are sore after doing back to back work trips.  He notes continued instability when changing directions laterally if he is fatigued.  On a positive note he did 45' on the elliptical without incident.        Objective: See treatment diary below      Assessment: Tolerated treatment well. Patient demonstrated fatigue post treatment, exhibited good technique with therapeutic exercises, and would benefit from continued PT      Plan: Continue per plan of care.      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, Brostrom, peroneal tendons repair       Manuals 3/24 3/27   PROM  KO HK   Jt mobs      MFR gastroc KO HK        Ther Ex 3/24 3/27        Bike 10' 10'   Step downs L3  x30 L3  x30   Slant str  3x30\" 3x30\"   AROM DF/PF IV/EV x30 x30   Ankle circles  x30 x30   SLS AE  x3 AE  x3   Standing DF 2x30 2x30   T-band DF and PF Blue  4x30 Blue  4x30   SL LP 90#  3x15 90#  3x15   HR's x30 x30   Wall slides  x30 x30   Lateral walking and  Lateral side  shuffle 5x20' NP   Lateral lunges - stationary 3x10 3x10   Sled push     BAPS DF/PF and IV/EV L1  x30 L1  x30                  Gait Training     Brief          Modalities 3/24 3/27    15' 15'                                                               "

## 2025-03-31 ENCOUNTER — OFFICE VISIT (OUTPATIENT)
Dept: PHYSICAL THERAPY | Facility: MEDICAL CENTER | Age: 60
End: 2025-03-31
Payer: COMMERCIAL

## 2025-03-31 DIAGNOSIS — M25.371 RIGHT ANKLE INSTABILITY: ICD-10-CM

## 2025-03-31 DIAGNOSIS — M25.571 RIGHT ANKLE PAIN, UNSPECIFIED CHRONICITY: Primary | ICD-10-CM

## 2025-03-31 PROCEDURE — 97112 NEUROMUSCULAR REEDUCATION: CPT | Performed by: PHYSICAL THERAPIST

## 2025-03-31 PROCEDURE — 97140 MANUAL THERAPY 1/> REGIONS: CPT | Performed by: PHYSICAL THERAPIST

## 2025-03-31 PROCEDURE — 97110 THERAPEUTIC EXERCISES: CPT | Performed by: PHYSICAL THERAPIST

## 2025-03-31 NOTE — PROGRESS NOTES
"Daily Note     Today's date: 3/31/2025  Patient name: Filipe Hill  : 1965  MRN: [id number]  Referring provider: Marcos Curry MD  Dx:   Encounter Diagnosis     ICD-10-CM    1. Right ankle pain, unspecified chronicity  M25.571       2. Right ankle instability  M25.371                      Subjective: Pt reports that his foot/ankle remains swollen.  He had his foot in a dependent position for much of the weekend.       Objective: See treatment diary below    I recommended that the patient wear compression socks regularly to reduce swelling.      Assessment: Tolerated treatment well. Patient demonstrated fatigue post treatment, exhibited good technique with therapeutic exercises, and would benefit from continued PT.  Pt is gradually progressing, currently limited by lingering swelling.        Plan: Continue per plan of care.      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, Brostrom, peroneal tendons repair       Manuals 3/24 3/27 3/31   PROM  KO HK HK   Jt mobs    HK   MFR gastroc KO HK NP         Ther Ex 3/24 3/27 3/31         Bike 10' 10' 10'   Step downs L3  x30 L3  x30 L3  x30   Slant str  3x30\" 3x30\" 3x30\"   AROM DF/PF IV/EV x30 x30 x30   Ankle circles  x30 x30 x30   SLS AE  x3 AE  x3 AE  x3   Standing DF 2x30 2x30 2x30   T-band DF and PF Blue  4x30 Blue  4x30 Blue  4x30   SL LP 90#  3x15 90#  3x15 90#  3x15   HR's x30 x30 x30   Wall slides  x30 x30 x30   Lateral walking and  Lateral side  shuffle 5x20' NP 3x20'   Lateral lunges - stationary 3x10 3x10 3x10   Sled push   70#  5x   BAPS DF/PF and IV/EV L1  x30 L1  x30 L1  x30                     Gait Training      Brief            Modalities 3/24 3/27 3/31    15' 15' 15'                                                                  " 36.3

## 2025-04-03 ENCOUNTER — OFFICE VISIT (OUTPATIENT)
Dept: PHYSICAL THERAPY | Facility: MEDICAL CENTER | Age: 60
End: 2025-04-03
Payer: COMMERCIAL

## 2025-04-03 DIAGNOSIS — M25.571 RIGHT ANKLE PAIN, UNSPECIFIED CHRONICITY: Primary | ICD-10-CM

## 2025-04-03 DIAGNOSIS — M25.371 RIGHT ANKLE INSTABILITY: ICD-10-CM

## 2025-04-03 PROCEDURE — 97112 NEUROMUSCULAR REEDUCATION: CPT

## 2025-04-03 PROCEDURE — 97110 THERAPEUTIC EXERCISES: CPT

## 2025-04-03 PROCEDURE — 97140 MANUAL THERAPY 1/> REGIONS: CPT

## 2025-04-03 NOTE — PROGRESS NOTES
"Daily Note     Today's date: 4/3/2025  Patient name: Filipe Hill  : 1965  MRN: [id number]  Referring provider: Marcos Curry MD  Dx:   Encounter Diagnosis     ICD-10-CM    1. Right ankle pain, unspecified chronicity  M25.571       2. Right ankle instability  M25.371                      Subjective: Pt reports that he was very pleased when he woke up this morning after working eight days in a row and his foot felt good.  Pt states that it still stiffens up after being stationary for approx 10 min. Pt also notes a stitch that has not dissolved and is sticking out from his incision.         Objective: See treatment diary below      Assessment: Tolerated treatment well. Patient demonstrated fatigue post treatment, exhibited good technique with therapeutic exercises, and would benefit from continued PT  Pt is making steady progress in his PT treatments with mobility, strength and endurance.        Plan: Continue per plan of care.      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, Brostrom, peroneal tendons repair       Manuals 3/24 3/27 3/31 4/3   PROM  KO HK HK KO   Jt mobs    HK    MFR gastroc KO HK NP KO          Ther Ex 3/24 3/27 3/31 4/3          Bike 10' 10' 10' 10'   Step downs L3  x30 L3  x30 L3  x30 L3  x30   Slant str  3x30\" 3x30\" 3x30\" 3x30\"   AROM DF/PF IV/EV x30 x30 x30 x30   Ankle circles  x30 x30 x30 x30   SLS AE  x3 AE  x3 AE  x3 AE  x3   Standing DF 2x30 2x30 2x30 2x30   T-band DF and PF Blue  4x30 Blue  4x30 Blue  4x30 Black  4x30   SL LP 90#  3x15 90#  3x15 90#  3x15 90#  3x15   HR's x30 x30 x30 x30   Wall slides  x30 x30 x30 x30   Lateral walking and  Lateral side  shuffle 5x20' NP 3x20' 3x20'   Lateral lunges - stationary 3x10 3x10 3x10 3x10   Sled push   70#  5x 95#  5x   BAPS DF/PF and IV/EV L1  x30 L1  x30 L1  x30 L1  x30                        Gait Training       Brief              Modalities 3/24 3/27 3/31 4/3    15' 15' 15' 15'     "

## 2025-04-09 ENCOUNTER — OFFICE VISIT (OUTPATIENT)
Dept: PHYSICAL THERAPY | Facility: MEDICAL CENTER | Age: 60
End: 2025-04-09
Payer: COMMERCIAL

## 2025-04-09 DIAGNOSIS — M25.371 RIGHT ANKLE INSTABILITY: ICD-10-CM

## 2025-04-09 DIAGNOSIS — M25.571 RIGHT ANKLE PAIN, UNSPECIFIED CHRONICITY: Primary | ICD-10-CM

## 2025-04-09 PROCEDURE — 97112 NEUROMUSCULAR REEDUCATION: CPT

## 2025-04-09 PROCEDURE — 97140 MANUAL THERAPY 1/> REGIONS: CPT

## 2025-04-09 PROCEDURE — 97110 THERAPEUTIC EXERCISES: CPT

## 2025-04-09 NOTE — PROGRESS NOTES
"Daily Note     Today's date: 2025  Patient name: Filipe Hill  : 1965  MRN: [id number]  Referring provider: Marcos Curry MD  Dx:   Encounter Diagnosis     ICD-10-CM    1. Right ankle pain, unspecified chronicity  M25.571       2. Right ankle instability  M25.371                      Subjective: Pt reports that his medial and lateral gastroc, plantar fascia and lateral foot are sore.  Pt states that he's been increasing resistance and activities at the gym and has also been flying more frequently in a short amount of time.  Pt states that he just \"feels fatigued today\".       Objective: See treatment diary below      Assessment: Tolerated treatment well. Patient demonstrated fatigue post treatment, exhibited good technique with therapeutic exercises, and would benefit from continued PT  Pt experienced discomfort with inversion on BAPS board today.   Held sled push/pulls today due to recent flare up.        Plan: Continue per plan of care.      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, Brostrom, peroneal tendons repair       Manuals 3/24 3/27 3/31 4/3 4/9   PROM  KO HK HK KO KO   Jt mobs    HK     MFR gastroc and anterior ankle joint KO HK NP KO KO           Ther Ex 3/24 3/27 3/31 4/3 4/9           Bike 10' 10' 10' 10' 10'   Step downs L3  x30 L3  x30 L3  x30 L3  x30 L3  x30   Slant str  3x30\" 3x30\" 3x30\" 3x30\" 3x30\"   AROM DF/PF IV/EV x30 x30 x30 x30 x30   Ankle circles  x30 x30 x30 x30 x30   SLS AE  x3 AE  x3 AE  x3 AE  x3 AE  x3   Standing DF 2x30 2x30 2x30 2x30 2x30   T-band DF and PF Blue  4x30 Blue  4x30 Blue  4x30 Black  4x30 Black  4x30   SL LP 90#  3x15 90#  3x15 90#  3x15 90#  3x15 90#  3x15   HR's x30 x30 x30 x30 x30   Wall slides  x30 x30 x30 x30 x30   Lateral walking and  Lateral side  shuffle 5x20' NP 3x20' 3x20' 3x20'   Lateral lunges - stationary 3x10 3x10 3x10 3x10 3x10   Sled push   70#  5x 95#  5x Held   BAPS DF/PF and IV/EV " L1  x30 L1  x30 L1  x30 L1  x30 L1  x30                           Gait Training        Brief                Modalities 3/24 3/27 3/31 4/3 4/9    15' 15' 15' 15' 15'

## 2025-04-11 ENCOUNTER — OFFICE VISIT (OUTPATIENT)
Dept: PHYSICAL THERAPY | Facility: MEDICAL CENTER | Age: 60
End: 2025-04-11

## 2025-04-11 DIAGNOSIS — M25.571 RIGHT ANKLE PAIN, UNSPECIFIED CHRONICITY: Primary | ICD-10-CM

## 2025-04-11 DIAGNOSIS — M25.371 RIGHT ANKLE INSTABILITY: ICD-10-CM

## 2025-04-11 PROCEDURE — 97112 NEUROMUSCULAR REEDUCATION: CPT

## 2025-04-11 PROCEDURE — 97140 MANUAL THERAPY 1/> REGIONS: CPT

## 2025-04-11 PROCEDURE — 97110 THERAPEUTIC EXERCISES: CPT

## 2025-04-11 NOTE — PROGRESS NOTES
"Daily Note     Today's date: 2025  Patient name: Filipe Hill  : 1965  MRN: [id number]  Referring provider: Marcos Curry MD  Dx:   Encounter Diagnosis     ICD-10-CM    1. Right ankle pain, unspecified chronicity  M25.571       2. Right ankle instability  M25.371                      Subjective: Pt reports that he's been experiencing increased episodes of pain this past week, but the sharp pains are not long lasting.       Objective: See treatment diary below      Assessment: Tolerated treatment well. Patient demonstrated fatigue post treatment, exhibited good technique with therapeutic exercises, and would benefit from continued PT  Decreased STT along lateral gastroc, but pt experienced increased sensitivity to incision w/IASTM.        Plan: Continue per plan of care.      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, Brostrom, peroneal tendons repair       Manuals 3/24 3/27 3/31 4/3 4/9 4/11   PROM  KO HK HK KO KO KO   Jt mobs    HK      MFR gastroc and anterior ankle joint KO HK NP KO KO KO            Ther Ex 3/24 3/27 3/31 4/3 4/9 4/11            Bike 10' 10' 10' 10' 10' 10'   Step downs L3  x30 L3  x30 L3  x30 L3  x30 L3  x30 L3  x30   Slant str  3x30\" 3x30\" 3x30\" 3x30\" 3x30\" 3x30\"   AROM DF/PF IV/EV x30 x30 x30 x30 x30 x30   Ankle circles  x30 x30 x30 x30 x30 x30   SLS AE  x3 AE  x3 AE  x3 AE  x3 AE  x3 AE  x3   Standing DF 2x30 2x30 2x30 2x30 2x30 2x   T-band DF and PF Blue  4x30 Blue  4x30 Blue  4x30 Black  4x30 Black  4x30 Black  4x30   SL LP 90#  3x15 90#  3x15 90#  3x15 90#  3x15 90#  3x15 110#  3x10   HR's x30 x30 x30 x30 x30 x30   Wall slides  x30 x30 x30 x30 x30 x30   Lateral walking and  Lateral side  shuffle 5x20' NP 3x20' 3x20' 3x20' 3x20'   Lateral lunges - stationary 3x10 3x10 3x10 3x10 3x10 3x10   Sled push   70#  5x 95#  5x Held Held   BAPS DF/PF and IV/EV L1  x30 L1  x30 L1  x30 L1  x30 L1  x30 L1  x30                            "   Gait Training         Brief                  Modalities 3/24 3/27 3/31 4/3 4/9 4/11    15' 15' 15' 15' 15' 15'

## 2025-04-14 ENCOUNTER — OFFICE VISIT (OUTPATIENT)
Dept: PHYSICAL THERAPY | Facility: MEDICAL CENTER | Age: 60
End: 2025-04-14

## 2025-04-14 DIAGNOSIS — M25.371 RIGHT ANKLE INSTABILITY: ICD-10-CM

## 2025-04-14 DIAGNOSIS — M25.571 RIGHT ANKLE PAIN, UNSPECIFIED CHRONICITY: Primary | ICD-10-CM

## 2025-04-14 PROCEDURE — 97140 MANUAL THERAPY 1/> REGIONS: CPT

## 2025-04-14 NOTE — PROGRESS NOTES
"Daily Note     Today's date: 2025  Patient name: Filipe Hill  : 1965  MRN: [id number]  Referring provider: Marcos Curry MD  Dx:   Encounter Diagnosis     ICD-10-CM    1. Right ankle pain, unspecified chronicity  M25.571       2. Right ankle instability  M25.371                      Subjective: Pt reports that he's lost all sensation on the top of his foot today and is not sure as to the cause of this.   Pt states that he was standing for approx 1 hour and this is the longest he's stood stationary in quite some time.  Pt also expresses concern with the fact that he doesn't have strength along the lateral portion of his foot and has difficulty with lateral push offs and balancing.        Objective: See treatment diary below      Assessment: Tolerated treatment well. Patient exhibited good technique with therapeutic exercises and would benefit from continued PT (+) neural sensations with IASTM to ankle and lateral calf, but denies pain with this.        Plan: Continue per plan of care.      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, Brostrom, peroneal tendons repair       Manuals 3/24 3/27 3/31 4/3 4/9 4/11 4/14   PROM  KO HK HK KO KO KO KO   Jt mobs    HK       MFR gastroc and anterior ankle joint KO HK NP KO KO KO KO             Ther Ex 3/24 3/27 3/31 4/3 4/9 4/11 4/14             Bike 10' 10' 10' 10' 10' 10' 10'   Step downs L3  x30 L3  x30 L3  x30 L3  x30 L3  x30 L3  x30 L3  x30   Slant str  3x30\" 3x30\" 3x30\" 3x30\" 3x30\" 3x30\" 3x30\"   AROM DF/PF IV/EV x30 x30 x30 x30 x30 x30 x30   Ankle circles  x30 x30 x30 x30 x30 x30 x30   SLS AE  x3 AE  x3 AE  x3 AE  x3 AE  x3 AE  x3 AE  x3   Standing DF 2x30 2x30 2x30 2x30 2x30 2x 2x   T-band DF and PF Blue  4x30 Blue  4x30 Blue  4x30 Black  4x30 Black  4x30 Black  4x30 Black  4x30   SL LP 90#  3x15 90#  3x15 90#  3x15 90#  3x15 90#  3x15 110#  3x10 110#  3x10   HR's x30 x30 x30 x30 x30 x30 x30   Wall slides "  x30 x30 x30 x30 x30 x30 NP   Lateral walking and  Lateral side  shuffle 5x20' NP 3x20' 3x20' 3x20' 3x20' 3x20'   Lateral lunges - stationary 3x10 3x10 3x10 3x10 3x10 3x10 3x10   Sled push   70#  5x 95#  5x Held Held 100#  5x   BAPS DF/PF and IV/EV L1  x30 L1  x30 L1  x30 L1  x30 L1  x30 L1  x30 L1  x30                                 Gait Training          Brief                    Modalities 3/24 3/27 3/31 4/3 4/9 4/11 4/14    15' 15' 15' 15' 15' 15' 15'

## 2025-04-18 ENCOUNTER — OFFICE VISIT (OUTPATIENT)
Dept: PHYSICAL THERAPY | Facility: MEDICAL CENTER | Age: 60
End: 2025-04-18

## 2025-04-18 DIAGNOSIS — M25.371 RIGHT ANKLE INSTABILITY: ICD-10-CM

## 2025-04-18 DIAGNOSIS — M25.571 RIGHT ANKLE PAIN, UNSPECIFIED CHRONICITY: Primary | ICD-10-CM

## 2025-04-18 PROCEDURE — 97140 MANUAL THERAPY 1/> REGIONS: CPT

## 2025-04-18 NOTE — PROGRESS NOTES
"Daily Note     Today's date: 2025  Patient name: Filipe Hill  : 1965  MRN: [id number]  Referring provider: Marcos Curry MD  Dx:   Encounter Diagnosis     ICD-10-CM    1. Right ankle pain, unspecified chronicity  M25.571       2. Right ankle instability  M25.371                      Subjective: Pt reports that his ankle is in pain today due to mowing the lawn on uneven and hilly surfaces.      Objective: See treatment diary below      Assessment: Tolerated treatment well. Patient demonstrated fatigue post treatment, exhibited good technique with therapeutic exercises, and would benefit from continued PT  Lateral movements increased pain, so these ex's were held today.       Plan: Continue per plan of care.      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, Brostrom, peroneal tendons repair       Manuals          PROM  HK         Jt mobs  HK         MFR gastroc and anterior ankle joint KO                   Ther Ex                    Bike 10'         Step downs L3  x30         Slant str  3x30\"         AROM DF/PF IV/EV X30           Ankle circles  x30         SLS AE  x3         Standing DF 2x30         T-band DF and PF Black  4x30         SL #  3x15         HR's x30         Wall slides  x30         Lateral walking and  Lateral side  shuffle 3x20'    Held         Lateral lunges - stationary 3x10  Held         Sled push Held         BAPS DF/PF and IV/EV L1  X30    No  IV/EV                                       Gait Training          Brief                    Modalities          MH 15'                                                                                      "

## 2025-04-24 ENCOUNTER — OFFICE VISIT (OUTPATIENT)
Dept: PHYSICAL THERAPY | Facility: MEDICAL CENTER | Age: 60
End: 2025-04-24

## 2025-04-24 DIAGNOSIS — M25.371 RIGHT ANKLE INSTABILITY: ICD-10-CM

## 2025-04-24 DIAGNOSIS — M25.571 RIGHT ANKLE PAIN, UNSPECIFIED CHRONICITY: Primary | ICD-10-CM

## 2025-04-24 PROCEDURE — 97140 MANUAL THERAPY 1/> REGIONS: CPT | Performed by: PHYSICAL THERAPIST

## 2025-04-24 NOTE — PROGRESS NOTES
"Daily Note     Today's date: 2025  Patient name: Filipe Hill  : 1965  MRN: [id number]  Referring provider: Marcos Curry MD  Dx:   Encounter Diagnosis     ICD-10-CM    1. Right ankle pain, unspecified chronicity  M25.571       2. Right ankle instability  M25.371                      Subjective: Pt reports that it took him 5 days to recover, but he is feeling better.        Objective: See treatment diary below      Assessment: Tolerated treatment well. Patient demonstrated fatigue post treatment, exhibited good technique with therapeutic exercises, and would benefit from continued PT      Plan: Continue per plan of care.      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, Brostrom, peroneal tendons repair       Manuals         PROM  HK HK        Jt mobs  HK HK        MFR gastroc and anterior ankle joint KO HK                  Ther Ex                   Bike 10' 10'        Step downs L3  x30 L3  x30        Slant str  3x30\" 3x30\"        AROM DF/PF IV/EV X30   X30        Ankle circles  x30 x30        SLS AE  x3 AE  x3        Standing DF 2x30 2x30        T-band DF and PF Black  4x30 Blk  4x30        SL #  3x15 110#  3x15        HR's x30 x30        Wall slides  x30 x30        Lateral walking and  Lateral side  shuffle 3x20'    Held 3x20'        Lateral lunges - stationary 3x10  Held 3x10        Sled push Held 80#  5x        BAPS DF/PF and IV/EV L1  X30    No  IV/EV L2  X30  All planes                                      Gait Training          Brief                    Modalities          15' 15'                                                                                       "

## 2025-04-28 ENCOUNTER — OFFICE VISIT (OUTPATIENT)
Dept: PHYSICAL THERAPY | Facility: MEDICAL CENTER | Age: 60
End: 2025-04-28

## 2025-04-28 DIAGNOSIS — M25.371 RIGHT ANKLE INSTABILITY: ICD-10-CM

## 2025-04-28 DIAGNOSIS — M25.571 RIGHT ANKLE PAIN, UNSPECIFIED CHRONICITY: Primary | ICD-10-CM

## 2025-04-28 PROCEDURE — 97140 MANUAL THERAPY 1/> REGIONS: CPT

## 2025-04-28 NOTE — PROGRESS NOTES
"Daily Note     Today's date: 2025  Patient name: Filipe Hill  : 1965  MRN: [id number]  Referring provider: Marcos Curry MD  Dx:   Encounter Diagnosis     ICD-10-CM    1. Right ankle pain, unspecified chronicity  M25.571       2. Right ankle instability  M25.371                      Subjective: Pt reports that he was able to play a game at [employer] without difficulty or pain in his ankle.        Objective: See treatment diary below      Assessment: Tolerated treatment well. Patient demonstrated fatigue post treatment, exhibited good technique with therapeutic exercises, and would benefit from continued PT  Pt remains challenged in his mobility with inversion on the BAPS board.        Plan: Continue per plan of care.      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, Brostrom, peroneal tendons repair       Manuals        PROM  HK HK KO       Jt mobs  HK HK        MFR gastroc and anterior ankle joint KO HK KO                 Ther Ex                  Bike 10' 10' 10'       Step downs L3  x30 L3  x30 L3  x30       Slant str  3x30\" 3x30\" 3x30\"       AROM DF/PF IV/EV X30   X30 x30       Ankle circles  x30 x30 x30       SLS AE  x3 AE  x3 AE  x3       Standing DF 2x30 2x30 2x30       T-band DF and PF Black  4x30 Blk  4x30 Blk  4x30       SL #  3x15 110#  3x15 110#  3x15       HR's x30 x30 x30       Wall slides  x30 x30 x30       Lateral walking and  Lateral side  shuffle 3x20'    Held 3x20' 3x20'       Lateral lunges  3x10  Held 3x10 3x10       Sled push Held 80#  5x 140#  5x       BAPS DF/PF and IV/EV L1  X30    No  IV/EV L2  X30  All planes L2  X30  All  planes                                     Gait Training          Brief                    Modalities         15' 15' 15'                                                                                        "

## 2025-05-02 ENCOUNTER — OFFICE VISIT (OUTPATIENT)
Dept: PHYSICAL THERAPY | Facility: MEDICAL CENTER | Age: 60
End: 2025-05-02

## 2025-05-02 DIAGNOSIS — M25.371 RIGHT ANKLE INSTABILITY: ICD-10-CM

## 2025-05-02 DIAGNOSIS — M25.571 RIGHT ANKLE PAIN, UNSPECIFIED CHRONICITY: Primary | ICD-10-CM

## 2025-05-02 PROCEDURE — 97140 MANUAL THERAPY 1/> REGIONS: CPT | Performed by: PHYSICAL THERAPIST

## 2025-05-02 PROCEDURE — 97110 THERAPEUTIC EXERCISES: CPT | Performed by: PHYSICAL THERAPIST

## 2025-05-07 ENCOUNTER — OFFICE VISIT (OUTPATIENT)
Dept: PHYSICAL THERAPY | Facility: MEDICAL CENTER | Age: 60
End: 2025-05-07

## 2025-05-07 DIAGNOSIS — M25.571 RIGHT ANKLE PAIN, UNSPECIFIED CHRONICITY: Primary | ICD-10-CM

## 2025-05-07 DIAGNOSIS — M25.371 RIGHT ANKLE INSTABILITY: ICD-10-CM

## 2025-05-07 PROCEDURE — 97140 MANUAL THERAPY 1/> REGIONS: CPT

## 2025-05-07 NOTE — PROGRESS NOTES
"Daily Note     Today's date: 2025  Patient name: Filipe Hill  : 1965  MRN: [id number]  Referring provider: Marcos Curry MD  Dx:   Encounter Diagnosis     ICD-10-CM    1. Right ankle pain, unspecified chronicity  M25.571       2. Right ankle instability  M25.371                      Subjective: Pt reports that he hasn't been able to perform his hep the past few days due to job training.  Pt states that quick reactions and lateral movements continue to be challenging and present increased pain post.       Objective: See treatment diary below      Assessment: Tolerated treatment well. Patient demonstrated fatigue post treatment, exhibited good technique with therapeutic exercises, and would benefit from continued PT  Pt was quite fatigued and sore after TE today.        Plan: Continue per plan of care.      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, Brostrom, peroneal tendons repair       Manuals      PROM  HK HK KO  KO     Jt mobs  HK HK        MFR gastroc and anterior ankle joint KO HK KO  KO               Ther Ex                Bike 10' 10' 10'  10'     Step downs L3  x30 L3  x30 L3  x30  L3  x30     Slant str  3x30\" 3x30\" 3x30\"  3x30\"     AROM DF/PF IV/EV X30   X30 x30  x30     Ankle circles  x30 x30 x30  x30     SLS AE  x3 AE  x3 AE  x3  AE  x3     Standing DF 2x30 2x30 2x30  2x30     T-band DF and PF Black  4x30 Blk  4x30 Blk  4x30  Blk  4x30     SL #  3x15 110#  3x15 110#  3x15  110#  3x15     HR's x30 x30 x30  x30     Wall slides  x30 x30 x30  x30     Lateral walking and  Lateral side  shuffle 3x20'    Held 3x20' 3x20'  3x20'     Lateral lunges  3x10  Held 3x10 3x10  3x10     Sled push Held 80#  5x 140#  5x  140#  5x     BAPS DF/PF and IV/EV L1  X30    No  IV/EV L2  X30  All planes L2  X30  All  planes  L2  X30  all     Bosu DL stand     3x30\"                         Gait Training          Brief     "                Modalities 4/18 4/24 4/28 5/7      15' 15' 15'  15'

## 2025-05-08 NOTE — PROGRESS NOTES
"Daily Note     Today's date: 2025  Patient name: Filipe Hill  : 1965  MRN: [id number]  Referring provider: Marcos Curry MD  Dx:   Encounter Diagnosis     ICD-10-CM    1. Right ankle pain, unspecified chronicity  M25.571       2. Right ankle instability  M25.371                      Subjective: Pt with no new changes to report today.        Objective: See treatment diary below      Assessment: Tolerated treatment well. Patient demonstrated fatigue post treatment, exhibited good technique with therapeutic exercises, and would benefit from continued PT      Plan: Continue per plan of care.      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, Brostrom, peroneal tendons repair       Manuals       PROM  HK HK KO HK      Jt mobs  HK HK        MFR gastroc and anterior ankle joint KO HK KO HK                Ther Ex                 Bike 10' 10' 10' 10'      Step downs L3  x30 L3  x30 L3  x30 L3  x30      Slant str  3x30\" 3x30\" 3x30\" 3x30\"      AROM DF/PF IV/EV X30   X30 x30 x30      Ankle circles  x30 x30 x30 x30      SLS AE  x3 AE  x3 AE  x3 AE  x3      Standing DF 2x30 2x30 2x30 2x30      T-band DF and PF Black  4x30 Blk  4x30 Blk  4x30 Blk  4x30      SL #  3x15 110#  3x15 110#  3x15 110#  3x15      HR's x30 x30 x30 x30      Wall slides  x30 x30 x30 x30      Lateral walking and  Lateral side  shuffle 3x20'    Held 3x20' 3x20' 3x20'      Lateral lunges  3x10  Held 3x10 3x10 3x10      Sled push Held 80#  5x 140#  5x 140#  5x      BAPS DF/PF and IV/EV L1  X30    No  IV/EV L2  X30  All planes L2  X30  All  planes L2  X30  All planes                                    Gait Training          Brief                    Modalities 8       15' 15' 15' 15'                                                                                         "

## 2025-05-09 ENCOUNTER — OFFICE VISIT (OUTPATIENT)
Dept: PHYSICAL THERAPY | Facility: MEDICAL CENTER | Age: 60
End: 2025-05-09

## 2025-05-09 DIAGNOSIS — M25.571 RIGHT ANKLE PAIN, UNSPECIFIED CHRONICITY: Primary | ICD-10-CM

## 2025-05-09 DIAGNOSIS — M25.371 RIGHT ANKLE INSTABILITY: ICD-10-CM

## 2025-05-09 PROCEDURE — 97140 MANUAL THERAPY 1/> REGIONS: CPT | Performed by: PHYSICAL THERAPIST

## 2025-05-09 PROCEDURE — 97110 THERAPEUTIC EXERCISES: CPT | Performed by: PHYSICAL THERAPIST

## 2025-05-09 NOTE — PROGRESS NOTES
"Daily Note     Today's date: 2025  Patient name: Filipe Hill  : 1965  MRN: [id number]  Referring provider: Marcos Curry MD  Dx:   Encounter Diagnosis     ICD-10-CM    1. Right ankle pain, unspecified chronicity  M25.571       2. Right ankle instability  M25.371                      Subjective: Pt reports continued difficulty with dynamic activities.        Objective: See treatment diary below      Assessment: Tolerated treatment well. Patient demonstrated fatigue post treatment, exhibited good technique with therapeutic exercises, and would benefit from continued PT      Plan: Continue per plan of care.      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, Brostrom, peroneal tendons repair       Manuals     PROM  HK HK KO HK KO HK    Jt mobs  HK HK  HK  HK    MFR gastroc and anterior ankle joint KO HK KO HK KO HK              Ther Ex               Bike 10' 10' 10' 10' 10' 10'    Step downs L3  x30 L3  x30 L3  x30 L3  x30 L3  x30 L3  x30    Slant str  3x30\" 3x30\" 3x30\" 3x30\" 3x30\" 3x30\"    AROM DF/PF IV/EV X30   X30 x30 x30 x30 x30    Ankle circles  x30 x30 x30 x30 x30 x30    SLS AE  x3 AE  x3 AE  x3 AE  x3 AE  x3 AE  3x    Standing DF 2x30 2x30 2x30 2x30 2x30 2x30    T-band DF and PF Black  4x30 Blk  4x30 Blk  4x30 Blk  4x30 Blk  4x30 Blk  4x30    SL #  3x15 110#  3x15 110#  3x15 110#  3x15 110#  3x15 110#  3x15    HR's x30 x30 x30 x30 x30 x30    Wall slides  x30 x30 x30 x30 x30 x30    Lateral walking and  Lateral side  shuffle 3x20'    Held 3x20' 3x20' 3x20' 3x20' 3x20'    Lateral lunges  3x10  Held 3x10 3x10 3x10 3x10 3x10    Sled push Held 80#  5x 140#  5x 140#  5x 140#  5x 140#  5x    BAPS DF/PF and IV/EV L1  X30    No  IV/EV L2  X30  All planes L2  X30  All  planes L2  X30  All  L2  X30  all L2  X30  all    Bosu DL stand    3x30\" 3x30\" 3x30\"                        Gait Training        "   Brief                    Modalities 4/18 4/24 4/28 5/2 5/7 5/9     15' 15' 15' 15' 15' 15'

## 2025-05-12 ENCOUNTER — OFFICE VISIT (OUTPATIENT)
Dept: PHYSICAL THERAPY | Facility: MEDICAL CENTER | Age: 60
End: 2025-05-12

## 2025-05-12 DIAGNOSIS — M25.571 RIGHT ANKLE PAIN, UNSPECIFIED CHRONICITY: Primary | ICD-10-CM

## 2025-05-12 DIAGNOSIS — M25.371 RIGHT ANKLE INSTABILITY: ICD-10-CM

## 2025-05-12 PROCEDURE — 97140 MANUAL THERAPY 1/> REGIONS: CPT | Performed by: PHYSICAL THERAPIST

## 2025-05-12 NOTE — PROGRESS NOTES
"Daily Note     Today's date: 2025  Patient name: Filipe Hill  : 1965  MRN: [id number]  Referring provider: Marcos Curry MD  Dx:   Encounter Diagnosis     ICD-10-CM    1. Right ankle pain, unspecified chronicity  M25.571       2. Right ankle instability  M25.371                      Subjective: Pt reports noticing improvement in his ankle over the weekend.        Objective: See treatment diary below      Assessment: Tolerated treatment well. Patient demonstrated fatigue post treatment, exhibited good technique with therapeutic exercises, and would benefit from continued PT      Plan: Continue per plan of care.      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, Brostrom, peroneal tendons repair       Manuals    PROM  HK HK KO HK KO HK HK   Jt mobs  HK HK  HK  HK HK   MFR gastroc and anterior ankle joint KO HK KO HK KO HK HK             Ther Ex              Bike 10' 10' 10' 10' 10' 10' 10'   Step downs L3  x30 L3  x30 L3  x30 L3  x30 L3  x30 L3  x30 L3  x30   Slant str  3x30\" 3x30\" 3x30\" 3x30\" 3x30\" 3x30\" 3x30\"   AROM DF/PF IV/EV X30   X30 x30 x30 x30 x30 x30   Ankle circles  x30 x30 x30 x30 x30 x30 x30   SLS AE  x3 AE  x3 AE  x3 AE  x3 AE  x3 AE  3x AE  3x   Standing DF 2x30 2x30 2x30 2x30 2x30 2x30 2x30   T-band DF and PF Black  4x30 Blk  4x30 Blk  4x30 Blk  4x30 Blk  4x30 Blk  4x30 Blk  4x30   SL #  3x15 110#  3x15 110#  3x15 110#  3x15 110#  3x15 110#  3x15 110#  3x15   HR's x30 x30 x30 x30 x30 x30 x30   Wall slides  x30 x30 x30 x30 x30 x30 x30   Lateral walking and  Lateral side  shuffle 3x20'    Held 3x20' 3x20' 3x20' 3x20' 3x20' 3x20'   Lateral lunges  3x10  Held 3x10 3x10 3x10 3x10 3x10 3x10   Sled push Held 80#  5x 140#  5x 140#  5x 140#  5x 140#  5x NP   BAPS DF/PF and IV/EV L1  X30    No  IV/EV L2  X30  All planes L2  X30  All  planes L2  X30  All  L2  X30  all L2  X30  all " "L2  X30  all   Bosu DL stand    3x30\" 3x30\" 3x30\" 3x30\"                       Gait Training          Brief                    Modalities 4/18 4/24 4/28 5/2 5/7 5/9     15' 15' 15' 15' 15' 15'                                                                                           "

## 2025-05-15 ENCOUNTER — OFFICE VISIT (OUTPATIENT)
Dept: PHYSICAL THERAPY | Facility: MEDICAL CENTER | Age: 60
End: 2025-05-15

## 2025-05-15 DIAGNOSIS — M25.371 RIGHT ANKLE INSTABILITY: ICD-10-CM

## 2025-05-15 DIAGNOSIS — M25.571 RIGHT ANKLE PAIN, UNSPECIFIED CHRONICITY: Primary | ICD-10-CM

## 2025-05-15 PROCEDURE — 97140 MANUAL THERAPY 1/> REGIONS: CPT | Performed by: PHYSICAL THERAPIST

## 2025-05-15 NOTE — PROGRESS NOTES
"Daily Note     Today's date: 5/15/2025  Patient name: Filipe Hill  : 1965  MRN: [id number]  Referring provider: Marcos Curry MD  Dx:   Encounter Diagnosis     ICD-10-CM    1. Right ankle pain, unspecified chronicity  M25.571       2. Right ankle instability  M25.371                      Subjective: Pt reports that he was able to run for a short distance and felt good.  Later last night his foot and ankle were swollen from the increased activity.  It seems to be better today.        Objective: See treatment diary below      Assessment: Tolerated treatment well. Patient demonstrated fatigue post treatment, exhibited good technique with therapeutic exercises, and would benefit from continued PT      Plan: Continue per plan of care.      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, Brostrom, peroneal tendons repair       Manuals 4/18 4/24 4/28 5/2 5/7 5/9 5/12 5/15   PROM  HK HK KO HK KO HK HK HK   Jt mobs  HK HK  HK  HK HK HK   MFR gastroc and anterior ankle joint KO HK KO HK KO HK HK HK              Ther Ex 4/18 4/24 4/28 5/2 5/7 5/9 5/12 5/15              Bike 10' 10' 10' 10' 10' 10' 10' 10'   Step downs L3  x30 L3  x30 L3  x30 L3  x30 L3  x30 L3  x30 L3  x30 L3  x30   Slant str  3x30\" 3x30\" 3x30\" 3x30\" 3x30\" 3x30\" 3x30\" 3x30\"   AROM DF/PF IV/EV X30   X30 x30 x30 x30 x30 x30 x30   Ankle circles  x30 x30 x30 x30 x30 x30 x30 x30   SLS AE  x3 AE  x3 AE  x3 AE  x3 AE  x3 AE  3x AE  3x AE  x3   Standing DF 2x30 2x30 2x30 2x30 2x30 2x30 2x30 2x30   T-band DF and PF Black  4x30 Blk  4x30 Blk  4x30 Blk  4x30 Blk  4x30 Blk  4x30 Blk  4x30 Blk  4x30   SL #  3x15 110#  3x15 110#  3x15 110#  3x15 110#  3x15 110#  3x15 110#  3x15 110#  3x15   HR's x30 x30 x30 x30 x30 x30 x30 x30   Wall slides  x30 x30 x30 x30 x30 x30 x30 x30   Lateral walking and  Lateral side  shuffle 3x20'    Held 3x20' 3x20' 3x20' 3x20' 3x20' 3x20' 3x20'   Lateral lunges  3x10  Held 3x10 " "3x10 3x10 3x10 3x10 3x10 3x10   Sled push Held 80#  5x 140#  5x 140#  5x 140#  5x 140#  5x #  5x   BAPS DF/PF and IV/EV L1  X30    No  IV/EV L2  X30  All planes L2  X30  All  planes L2  X30  All  L2  X30  all L2  X30  all L2  X30  all L2  X30  All     Bosu DL stand    3x30\" 3x30\" 3x30\" 3x30\" 3x30\"                         Gait Training           Brief                      Modalities 4/18 4/24 4/28 5/2 5/7 5/9 5/12 5/15    15' 15' 15' 15' 15' 15' 15' 15'                                                                                             "

## 2025-05-19 ENCOUNTER — OFFICE VISIT (OUTPATIENT)
Dept: PHYSICAL THERAPY | Facility: MEDICAL CENTER | Age: 60
End: 2025-05-19

## 2025-05-19 DIAGNOSIS — M25.371 RIGHT ANKLE INSTABILITY: ICD-10-CM

## 2025-05-19 DIAGNOSIS — M25.571 RIGHT ANKLE PAIN, UNSPECIFIED CHRONICITY: Primary | ICD-10-CM

## 2025-05-19 PROCEDURE — 97140 MANUAL THERAPY 1/> REGIONS: CPT | Performed by: PHYSICAL THERAPIST

## 2025-05-19 NOTE — PROGRESS NOTES
"Daily Note     Today's date: 2025  Patient name: Filipe Hill  : 1965  MRN: [id number]  Referring provider: Marcos Curry MD  Dx:   Encounter Diagnosis     ICD-10-CM    1. Right ankle pain, unspecified chronicity  M25.571       2. Right ankle instability  M25.371                      Subjective: Pt reports that he was very active over the weekend and his foot/ankle was swollen by last night.        Objective: See treatment diary below      Assessment: Tolerated treatment well. Patient demonstrated fatigue post treatment, exhibited good technique with therapeutic exercises, and would benefit from continued PT.  Pt is gradually improving in all areas.        Plan: Continue per plan of care.      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, Brostrom, peroneal tendons repair       Manuals    PROM  HK   Jt mobs  HK   MFR gastroc and anterior ankle joint HK       Ther Ex        Bike 10'   Step downs L3  x30   Slant str  3x30\"   AROM DF/PF IV/EV x30   Ankle circles  x30   SLS AE  x3   Standing DF 2x30   T-band DF and PF Blk  4x30   SL #  3x15   HR's x30   Wall slides  x30   Lateral walking and  Lateral side  shuffle 3x20'   Lateral lunges  3x10   Sled push 140#  5x   BAPS DF/PF and IV/EV L2  X30  All     Bosu DL stand 3x30\"           Gait Training    Brief        Modalities    MH 15'                                                                                        "

## 2025-05-22 ENCOUNTER — OFFICE VISIT (OUTPATIENT)
Dept: PHYSICAL THERAPY | Facility: MEDICAL CENTER | Age: 60
End: 2025-05-22

## 2025-05-22 DIAGNOSIS — M25.571 RIGHT ANKLE PAIN, UNSPECIFIED CHRONICITY: Primary | ICD-10-CM

## 2025-05-22 DIAGNOSIS — M25.371 RIGHT ANKLE INSTABILITY: ICD-10-CM

## 2025-05-22 PROCEDURE — 97140 MANUAL THERAPY 1/> REGIONS: CPT

## 2025-05-27 ENCOUNTER — OFFICE VISIT (OUTPATIENT)
Dept: PHYSICAL THERAPY | Facility: MEDICAL CENTER | Age: 60
End: 2025-05-27
Payer: COMMERCIAL

## 2025-05-27 DIAGNOSIS — M25.571 RIGHT ANKLE PAIN, UNSPECIFIED CHRONICITY: Primary | ICD-10-CM

## 2025-05-27 DIAGNOSIS — M25.371 RIGHT ANKLE INSTABILITY: ICD-10-CM

## 2025-05-27 PROCEDURE — 97140 MANUAL THERAPY 1/> REGIONS: CPT

## 2025-05-27 NOTE — PROGRESS NOTES
"Daily Note     Today's date: 2025  Patient name: Filipe Hill  : 1965  MRN: [id number]  Referring provider: Marcos Curry MD  Dx:   Encounter Diagnosis     ICD-10-CM    1. Right ankle pain, unspecified chronicity  M25.571       2. Right ankle instability  M25.371                      Subjective: Pt reports that he has no ankle mobility when flexing his foot forward.  Pt also states that he cannot feel his calf mm activate with HR's and various other ex's that cause him to engage this mm.  Pt states that he still struggles with increased edema and limitations in his mobility.        Objective: See treatment diary below      Assessment: Tolerated treatment fairly well. Patient demonstrated fatigue post treatment, exhibited good technique with therapeutic exercises, and would benefit from continued PT  Pt is challenged with feeling the activation of his gastroc mm's unless he can visually observe the activation.  Pt frustrated with his reoccurring setbacks.        Plan: Continue per plan of care.      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, Brostrom, peroneal tendons repair       Manuals    PROM  HK KO KO   Jt mobs  HK  HK   MFR gastroc and anterior ankle joint HK KO KO         Ther Ex          Bike 10' 10' 10'   Step downs L3  x30 L3  x30 L3  x30   Slant str  3x30\" 3x30\" 3x30\"   AROM DF/PF IV/EV x30 x30 x30   Ankle circles  x30 x30 x30   SLS AE  x3 AE  x3 AE  x3   Standing DF 2x30 2x30 2x30   T-band DF and PF Blk  4x30 Blk  4x30 Blk  4x30   SL #  3x15 110#  3x15 110#  3x15   HR's x30 x30 x30   Wall slides  x30 x30 x30   Lateral walking and  Lateral side  shuffle 3x20' 3x20' 3x20'   Lateral lunges  3x10 3x10 3x10   Sled push 140#  5x 145#  5x NP   BAPS DF/PF and IV/EV L2  X30  All   L2  X30  all L2  X30  all   Bosu DL stand 3x30\" 3x30\"  (Pain) 3x30\"   Seated HR's w/5#   3x10   Standing ankle mobility   3x10   Gait " Training      Brief            Modalities 5/19 5/22     15' 15'

## 2025-05-29 ENCOUNTER — OFFICE VISIT (OUTPATIENT)
Dept: PHYSICAL THERAPY | Facility: MEDICAL CENTER | Age: 60
End: 2025-05-29
Payer: COMMERCIAL

## 2025-05-29 DIAGNOSIS — M25.571 RIGHT ANKLE PAIN, UNSPECIFIED CHRONICITY: Primary | ICD-10-CM

## 2025-05-29 DIAGNOSIS — M25.371 RIGHT ANKLE INSTABILITY: ICD-10-CM

## 2025-05-29 PROCEDURE — 97140 MANUAL THERAPY 1/> REGIONS: CPT | Performed by: PHYSICAL THERAPIST

## 2025-05-29 NOTE — PROGRESS NOTES
"Daily Note     Today's date: 2025  Patient name: Filipe Hill  : 1965  MRN: [id number]  Referring provider: Marcos Curry MD  Dx:   Encounter Diagnosis     ICD-10-CM    1. Right ankle pain, unspecified chronicity  M25.571       2. Right ankle instability  M25.371                      Subjective: Pt reports that his ankle is still bothering him, especially with lateral movement of the ankle.        Objective: See treatment diary below    WB lateral activities held today    Assessment: Tolerated treatment well. Patient demonstrated fatigue post treatment, exhibited good technique with therapeutic exercises, and would benefit from continued PT      Plan: Continue per plan of care.      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, Brostrom, peroneal tendons repair       Manuals    PROM  HK KO KO HK   Jt mobs  HK  HK HK   MFR gastroc and anterior ankle joint HK KO KO NP          Ther Ex           Bike 10' 10' 10' 10'   Step downs L3  x30 L3  x30 L3  x30 L3  x30   Slant str  3x30\" 3x30\" 3x30\" 3x30\"   AROM DF/PF IV/EV x30 x30 x30 x30   Ankle circles  x30 x30 x30 x30   SLS AE  x3 AE  x3 AE  x3 AE  x3   Standing DF 2x30 2x30 2x30 2x30   T-band DF and PF, IV and EV Blk  4x30 Blk  4x30 Blk  4x30 Blk  4x30   SL #  3x15 110#  3x15 110#  3x15 100#  3x15   HR's x30 x30 x30 x30   Wall slides  x30 x30 x30 x30   Lateral walking and  Lateral side  shuffle 3x20' 3x20' 3x20' NP   Lateral lunges  3x10 3x10 3x10 NP   Sled push 140#  5x 145#  5x #  5x   BAPS DF/PF and IV/EV L2  X30  All   L2  X30  all L2  X30  all NP   Bosu DL stand 3x30\" 3x30\"  (Pain) 3x30\" 3x30\"   Seated HR's w/5#   3x10 x30   Standing ankle mobility   3x10 x30   Gait Training       Brief              Modalities     15' 15' 15' 15'                                                                                                 "

## 2025-06-02 ENCOUNTER — OFFICE VISIT (OUTPATIENT)
Dept: PHYSICAL THERAPY | Facility: MEDICAL CENTER | Age: 60
End: 2025-06-02

## 2025-06-02 DIAGNOSIS — M25.371 RIGHT ANKLE INSTABILITY: ICD-10-CM

## 2025-06-02 DIAGNOSIS — M25.571 RIGHT ANKLE PAIN, UNSPECIFIED CHRONICITY: Primary | ICD-10-CM

## 2025-06-02 PROCEDURE — 97140 MANUAL THERAPY 1/> REGIONS: CPT

## 2025-06-02 NOTE — PROGRESS NOTES
"Daily Note     Today's date: 2025  Patient name: Filipe Hill  : 1965  MRN: [id number]  Referring provider: Marcos Curry MD  Dx:   Encounter Diagnosis     ICD-10-CM    1. Right ankle pain, unspecified chronicity  M25.571       2. Right ankle instability  M25.371                      Subjective: Pt reports that he doesn't feel any worse since LV and actually feels a bit better.  Pt also expresses concern over the increased discomfort and pain along his lateral foot that has been occurring more frequently in the past 10 days.    Objective: See treatment diary below      Assessment: Tolerated treatment well. Patient displays a moderate amount of restriction along his fibular head and peroneals.  Significant sensitivity with touch or with Graston tools around his lateral malleolus and lateral foot.  Post manuals, pt experienced difficulty going from sit to stand due to pain and lack of strength today and states that this is something new.  Pt has follow up appointment with surgeon this week.  Pt was also advised by PTGUERDA to use a rolling stick to help break up adhesions along peroneals.     Plan: Continue per plan of care.      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, Brostrom, peroneal tendons repair       Manuals    PROM  HK KO KO HK KO   Jt mobs  HK  HK HK    MFR gastroc and anterior ankle joint HK KO KO NP Peron  eals  KO           Ther Ex  6           Bike 10' 10' 10' 10' 10'   Step downs L3  x30 L3  x30 L3  x30 L3  x30 L3  x30   Slant str  3x30\" 3x30\" 3x30\" 3x30\" 3x30\"   AROM DF/PF IV/EV x30 x30 x30 x30 x30   Ankle circles  x30 x30 x30 x30 x30   SLS AE  x3 AE  x3 AE  x3 AE  x3 AE  x3   Standing DF 2x30 2x30 2x30 2x30 2x30   T-band DF and PF, IV and EV Blk  4x30 Blk  4x30 Blk  4x30 Blk  4x30 Grn  4x30   SL #  3x15 110#  3x15 110#  3x15 100#  3x15 100#  3x15   HR's x30 x30 x30 x30 x30   Wall " "slides  x30 x30 x30 x30 x30   Lateral walking and  Lateral side  shuffle 3x20' 3x20' 3x20' NP NP   Lateral lunges  3x10 3x10 3x10 NP NP   Sled push 140#  5x 145#  5x #  5x 140#  5x   BAPS DF/PF and IV/EV L2  X30  All   L2  X30  all L2  X30  all NP NP   Bosu DL stand 3x30\" 3x30\"  (Pain) 3x30\" 3x30\" 3x30\"   Seated HR's w/5#   3x10 x30 NP   Standing ankle mobility   3x10 x30 x30   Gait Training        Brief                Modalities 5/19 5/22 5/27 5/29 6/2    15' 15' 15' 15' 15'                                                                                                    "

## 2025-06-05 ENCOUNTER — OFFICE VISIT (OUTPATIENT)
Dept: PHYSICAL THERAPY | Facility: MEDICAL CENTER | Age: 60
End: 2025-06-05

## 2025-06-05 DIAGNOSIS — M25.371 RIGHT ANKLE INSTABILITY: ICD-10-CM

## 2025-06-05 DIAGNOSIS — M25.571 RIGHT ANKLE PAIN, UNSPECIFIED CHRONICITY: Primary | ICD-10-CM

## 2025-06-05 PROCEDURE — 97140 MANUAL THERAPY 1/> REGIONS: CPT | Performed by: PHYSICAL THERAPIST

## 2025-06-05 NOTE — PROGRESS NOTES
"Daily Note     Today's date: 2025  Patient name: Filipe Hill  : 1965  MRN: [id number]  Referring provider: Marcos Curry MD  Dx:   Encounter Diagnosis     ICD-10-CM    1. Right ankle pain, unspecified chronicity  M25.571       2. Right ankle instability  M25.371                      Subjective: Pt reports that he went to see his surgeon who told him that it would be 9-12 months before he would be back to full unrestricted activity.        Objective: See treatment diary below      Assessment: Tolerated treatment well. Patient demonstrated fatigue post treatment, exhibited good technique with therapeutic exercises, and would benefit from continued PT      Plan: Continue per plan of care.      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, Brostrom, peroneal tendons repair       Manuals    PROM  HK KO KO HK KO    Jt mobs  HK  HK HK  HK   MFR gastroc and anterior ankle joint HK KO KO NP Peron  eals  KO HK            Ther Ex             Bike 10' 10' 10' 10' 10' 10'   Step downs L3  x30 L3  x30 L3  x30 L3  x30 L3  x30 L3  x30   Slant str  3x30\" 3x30\" 3x30\" 3x30\" 3x30\" 3x30\"   AROM DF/PF IV/EV x30 x30 x30 x30 x30 x30   Ankle circles  x30 x30 x30 x30 x30 x30   SLS AE  x3 AE  x3 AE  x3 AE  x3 AE  x3 AE  x3   Standing DF 2x30 2x30 2x30 2x30 2x30 2x30   T-band DF and PF, IV and EV Blk  4x30 Blk  4x30 Blk  4x30 Blk  4x30 Grn  4x30 Green  4x30   SL #  3x15 110#  3x15 110#  3x15 100#  3x15 100#  3x15 100#  3x15   HR's x30 x30 x30 x30 x30 x30   Wall slides  x30 x30 x30 x30 x30 x30   Lateral walking and  Lateral side  shuffle 3x20' 3x20' 3x20' NP NP NP   Lateral lunges  3x10 3x10 3x10 NP NP NP   Sled push 140#  5x 145#  5x #  5x 140#  5x 140#  5x   BAPS DF/PF and IV/EV L2  X30  All   L2  X30  all L2  X30  all NP NP NP   Bosu DL stand 3x30\" 3x30\"  (Pain) 3x30\" 3x30\" 3x30\" 3x30\"   Seated HR's w/5#   3x10 " x30 NP NP   Standing ankle mobility   3x10 x30 x30 x30   Gait Training         Brief                  Modalities 5/19 5/22 5/27 5/29 6/2 6/5    15' 15' 15' 15' 15' 15'

## 2025-06-09 ENCOUNTER — OFFICE VISIT (OUTPATIENT)
Dept: PHYSICAL THERAPY | Facility: MEDICAL CENTER | Age: 60
End: 2025-06-09

## 2025-06-09 DIAGNOSIS — M25.371 RIGHT ANKLE INSTABILITY: ICD-10-CM

## 2025-06-09 DIAGNOSIS — M25.571 RIGHT ANKLE PAIN, UNSPECIFIED CHRONICITY: Primary | ICD-10-CM

## 2025-06-09 PROCEDURE — 97140 MANUAL THERAPY 1/> REGIONS: CPT

## 2025-06-09 NOTE — PROGRESS NOTES
"Daily Note     Today's date: 2025  Patient name: Filipe Hill  : 1965  MRN: [id number]  Referring provider: Marcos Curry MD  Dx:   Encounter Diagnosis     ICD-10-CM    1. Right ankle pain, unspecified chronicity  M25.571       2. Right ankle instability  M25.371                      Subjective: Pt reports that he felt somewhat better after LV.        Objective: See treatment diary below      Assessment: Tolerated treatment well. Patient experienced discomfort and pain and many different neural sensations after performing IASTM more aggressively this visit.  Pt continued with difficulty standing and walking after manuals performed.  Pt would benefit from continued PT.         Plan: Continue per plan of care.      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, Brostrom, peroneal tendons repair       Manuals    PROM  HK KO KO HK KO  KO   Jt mobs  HK  HK HK  HK    MFR gastroc and anterior ankle joint HK KO KO NP Peron  eals  KO HK KO  And  incision   Flexion Man  Resistance       KO  x30   Ther Ex              Bike 10' 10' 10' 10' 10' 10' 10'   Step downs L3  x30 L3  x30 L3  x30 L3  x30 L3  x30 L3  x30 L3  x30   Slant str  3x30\" 3x30\" 3x30\" 3x30\" 3x30\" 3x30\" 3x30\"   AROM DF/PF IV/EV x30 x30 x30 x30 x30 x30 x30   Ankle circles  x30 x30 x30 x30 x30 x30 x30   SLS AE  x3 AE  x3 AE  x3 AE  x3 AE  x3 AE  x3 AE  x3   Standing DF 2x30 2x30 2x30 2x30 2x30 2x30 2x30   T-band DF and PF, IV and EV Blk  4x30 Blk  4x30 Blk  4x30 Blk  4x30 Grn  4x30 Green  4x30 Green  4x30   SL #  3x15 110#  3x15 110#  3x15 100#  3x15 100#  3x15 100#  3x15 NP   HR's x30 x30 x30 x30 x30 x30 x30   Wall slides  x30 x30 x30 x30 x30 x30 NP   Lateral walking and  Lateral side  shuffle 3x20' 3x20' 3x20' NP NP NP NP   Lateral lunges  3x10 3x10 3x10 NP NP NP NP   Sled push 140#  5x 145#  5x #  5x 140#  5x 140#  5x NP   BAPS " "DF/PF and IV/EV L2  X30  All   L2  X30  all L2  X30  all NP NP NP NP   Bosu DL stand 3x30\" 3x30\"  (Pain) 3x30\" 3x30\" 3x30\" 3x30\" 3x30\"   Seated HR's w/5#   3x10 x30 NP NP NP   Standing ankle mobility   3x10 x30 x30 x30 x30   Gait Training          Brief                    Modalities 5/19 5/22 5/27 5/29 6/2 6/5 6/9    15' 15' 15' 15' 15' 15' 15'                                                                                                          "

## 2025-06-12 ENCOUNTER — OFFICE VISIT (OUTPATIENT)
Dept: PHYSICAL THERAPY | Facility: MEDICAL CENTER | Age: 60
End: 2025-06-12

## 2025-06-12 DIAGNOSIS — M25.371 RIGHT ANKLE INSTABILITY: ICD-10-CM

## 2025-06-12 DIAGNOSIS — M25.571 RIGHT ANKLE PAIN, UNSPECIFIED CHRONICITY: Primary | ICD-10-CM

## 2025-06-12 PROCEDURE — 97140 MANUAL THERAPY 1/> REGIONS: CPT | Performed by: PHYSICAL THERAPIST

## 2025-06-12 NOTE — PROGRESS NOTES
"Daily Note     Today's date: 2025  Patient name: Filipe Hill  : 1965  MRN: [id number]  Referring provider: Marcos Curry MD  Dx:   Encounter Diagnosis     ICD-10-CM    1. Right ankle pain, unspecified chronicity  M25.571       2. Right ankle instability  M25.371                      Subjective: Pt reports having more pain and burning after last visit, but it seems to have resolved quickly and the past 2 days have been pretty good.        Objective: See treatment diary below      Assessment: Tolerated treatment well. Patient demonstrated fatigue post treatment, exhibited good technique with therapeutic exercises, and would benefit from continued PT      Plan: Continue per plan of care.      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, Brostrom, peroneal tendons repair       Manuals    PROM  HK KO KO HK KO  KO    Jt mobs  HK  HK HK  HK     MFR gastroc and anterior ankle joint HK KO KO NP Peron  eals  KO HK KO  And  incision HK   Flexion Man  Resistance       KO  x30 NP   Ther Ex               Bike 10' 10' 10' 10' 10' 10' 10' 10'   Step downs L3  x30 L3  x30 L3  x30 L3  x30 L3  x30 L3  x30 L3  x30 L3  x30   Slant str  3x30\" 3x30\" 3x30\" 3x30\" 3x30\" 3x30\" 3x30\" 3x30\"   AROM DF/PF IV/EV x30 x30 x30 x30 x30 x30 x30 x30   Ankle circles  x30 x30 x30 x30 x30 x30 x30 x30   SLS AE  x3 AE  x3 AE  x3 AE  x3 AE  x3 AE  x3 AE  x3 AE  3x   Standing DF 2x30 2x30 2x30 2x30 2x30 2x30 2x30 3x30   T-band DF and PF, IV and EV Blk  4x30 Blk  4x30 Blk  4x30 Blk  4x30 Grn  4x30 Green  4x30 Green  4x30 Blk  4x30   SL #  3x15 110#  3x15 110#  3x15 100#  3x15 100#  3x15 100#  3x15 NP NP   HR's x30 x30 x30 x30 x30 x30 x30 x30   Wall slides  x30 x30 x30 x30 x30 x30 NP 3x20'   Lateral walking and  Lateral side  shuffle 3x20' 3x20' 3x20' NP NP NP NP    Lateral lunges  3x10 3x10 3x10 NP NP NP NP NP   Sled " "push 140#  5x 145#  5x #  5x 140#  5x 140#  5x NP NP   BAPS DF/PF and IV/EV L2  X30  All   L2  X30  all L2  X30  all NP NP NP NP NP   Bosu DL stand 3x30\" 3x30\"  (Pain) 3x30\" 3x30\" 3x30\" 3x30\" 3x30\" 3x30\"   Seated HR's w/5#   3x10 x30 NP NP NP NP   Standing ankle mobility   3x10 x30 x30 x30 x30 X30  Y   Gait Training           Brief                      Modalities 5/19 5/22 5/27 5/29 6/2 6/5 6/9 6/12    15' 15' 15' 15' 15' 15' 15' 15'                                                                                                             "

## 2025-06-13 ENCOUNTER — OFFICE VISIT (OUTPATIENT)
Dept: PHYSICAL THERAPY | Facility: MEDICAL CENTER | Age: 60
End: 2025-06-13

## 2025-06-13 DIAGNOSIS — M25.371 RIGHT ANKLE INSTABILITY: ICD-10-CM

## 2025-06-13 DIAGNOSIS — M25.571 RIGHT ANKLE PAIN, UNSPECIFIED CHRONICITY: Primary | ICD-10-CM

## 2025-06-13 PROCEDURE — 97140 MANUAL THERAPY 1/> REGIONS: CPT

## 2025-06-13 NOTE — PROGRESS NOTES
"Daily Note     Today's date: 2025  Patient name: Filipe Hill  : 1965  MRN: [id number]  Referring provider: Marcos Curry MD  Dx:   Encounter Diagnosis     ICD-10-CM    1. Right ankle pain, unspecified chronicity  M25.571       2. Right ankle instability  M25.371                      Subjective: Pt reports soreness, mainly along his lateral foot, but feels as though he has a \"hitch\" with walking.       Objective: See treatment diary below      Assessment: Tolerated treatment well. Patient demonstrated fatigue post treatment, exhibited good technique with therapeutic exercises, and would benefit from continued PT  Pt will be on vacation for two weeks and will resume PT upon return.        Plan: Continue per plan of care.      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, Brostrom, peroneal tendons repair       Manuals    PROM   KO   Jt mobs      MFR gastroc and anterior ankle joint HK KO   Flexion Man  Resistance NP NP   Ther Ex         Bike 10' 10'   Step downs L3  x30 L3  x30   Slant str  3x30\" 3x30\"   AROM DF/PF IV/EV x30 x30   Ankle circles  x30 x30   SLS AE  3x AE  3x   Standing DF 3x30 3x30   T-band DF and PF, IV and EV Blk  4x30 Blk  4x30   SL LP NP NP   HR's x30 x30   Wall slides  3x20' NP   Lateral walking and  Lateral side  shuffle  3x20'   Lateral lunges  NP 3x10   Sled push NP NP   BAPS DF/PF and IV/EV NP NP   Bosu DL stand 3x30\" 3x30\"   Seated HR's w/5# NP NP   Standing ankle mobility X30  Y X30  Y   Gait Training     Brief          Modalities     15' 15'                                                                                                         "

## 2025-07-11 ENCOUNTER — OFFICE VISIT (OUTPATIENT)
Dept: PHYSICAL THERAPY | Facility: MEDICAL CENTER | Age: 60
End: 2025-07-11

## 2025-07-11 DIAGNOSIS — M25.371 RIGHT ANKLE INSTABILITY: ICD-10-CM

## 2025-07-11 DIAGNOSIS — M25.571 RIGHT ANKLE PAIN, UNSPECIFIED CHRONICITY: Primary | ICD-10-CM

## 2025-07-11 PROCEDURE — 97140 MANUAL THERAPY 1/> REGIONS: CPT

## 2025-07-11 NOTE — PROGRESS NOTES
"Daily Note     Today's date: 2025  Patient name: Filipe Hill  : 1965  MRN: [id number]  Referring provider: Marcos Curry MD  Dx:   Encounter Diagnosis     ICD-10-CM    1. Right ankle pain, unspecified chronicity  M25.571       2. Right ankle instability  M25.371                      Subjective: Pt reports that there were a few days he had trouble walking while on vacation.      Objective: See treatment diary below      Assessment: Tolerated treatment well. Patient demonstrated fatigue post treatment, exhibited good technique with therapeutic exercises, and would benefit from continued PT  Trialed EPAT today w/fair response - assess at [place].       Plan: Continue per plan of care.      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, Brostrom, peroneal tendons repair       Manuals    PROM   KO KO   Jt mobs       MFR gastroc and anterior ankle joint HK KO EPAT  KO   Flexion Man  Resistance NP NP NP   Ther Ex          Bike 10' 10' 10'   Step downs L3  x30 L3  x30 L3  x30   Slant str  3x30\" 3x30\" 3x30\"   AROM DF/PF IV/EV x30 x30 x30   Ankle circles  x30 x30 x30   SLS AE  3x AE  3x AE  3x   Standing DF 3x30 3x30 3x30   T-band DF and PF, IV and EV Blk  4x30 Blk  4x30 Blk  4x30   SL LP NP NP    HR's x30 x30 x30   Wall slides  3x20' NP NP   Lateral walking and  Lateral side  shuffle  3x20' 3x20'   Lateral lunges  NP 3x10 3x10   Sled push NP #  20'x4   BAPS DF/PF and IV/EV NP NP L2  x30   Bosu DL stand 3x30\" 3x30\" 3x30\"   Seated HR's w/5# NP NP NP   Standing ankle mobility X30  Y X30  Y X30  Y   Gait Training      Brief            Modalities     15' 15' 15'                                                                                                            "

## 2025-07-14 ENCOUNTER — OFFICE VISIT (OUTPATIENT)
Dept: PHYSICAL THERAPY | Facility: MEDICAL CENTER | Age: 60
End: 2025-07-14
Payer: COMMERCIAL

## 2025-07-14 DIAGNOSIS — M25.571 RIGHT ANKLE PAIN, UNSPECIFIED CHRONICITY: Primary | ICD-10-CM

## 2025-07-14 DIAGNOSIS — M25.371 RIGHT ANKLE INSTABILITY: ICD-10-CM

## 2025-07-14 PROCEDURE — 97140 MANUAL THERAPY 1/> REGIONS: CPT | Performed by: PHYSICAL THERAPIST

## 2025-07-14 NOTE — PROGRESS NOTES
"Daily Note     Today's date: 2025  Patient name: Filipe Hill  : 1965  MRN: [id number]  Referring provider: Marcos Curry MD  Dx:   Encounter Diagnosis     ICD-10-CM    1. Right ankle pain, unspecified chronicity  M25.571       2. Right ankle instability  M25.371                      Subjective: Pt reports that he was able to play in a softball game that wasn't super competitive.  He is sore today, but playing went well.        Objective: See treatment diary below      Assessment: Tolerated treatment well. Patient demonstrated fatigue post treatment, exhibited good technique with therapeutic exercises, and would benefit from continued PT      Plan: Continue per plan of care.      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, Brostrom, peroneal tendons repair       Manuals    PROM   KO KO HK   Jt mobs     HK   MFR gastroc and anterior ankle joint HK KO EPAT  KO HK   Flexion Man  Resistance NP NP NP    Ther Ex           Bike 10' 10' 10' 10'   Step downs L3  x30 L3  x30 L3  x30 L3  x30   Slant str  3x30\" 3x30\" 3x30\" 3x30\"   AROM DF/PF IV/EV x30 x30 x30 30   Ankle circles  x30 x30 x30 x30   SLS AE  3x AE  3x AE  3x AE  3x   Standing DF 3x30 3x30 3x30 3x30   T-band DF and PF, IV and EV Blk  4x30 Blk  4x30 Blk  4x30 Blk  4x30   SL LP NP NP     HR's x30 x30 x30 x30   Wall slides  3x20' NP NP    Lateral walking and  Lateral side  shuffle  3x20' 3x20' 3x20'   Lateral lunges  NP 3x10 3x10 3x10   Sled push NP #  20'x4 170#  4x20'   BAPS DF/PF and IV/EV NP NP L2  x30 L2  x30   Bosu DL stand 3x30\" 3x30\" 3x30\" 3x30\"   Seated HR's w/5# NP NP NP NP   Standing ankle mobility X30  Y X30  Y X30  Y Y  x30   Ladders     2x                 Modalities     15' 15' 15' 15'                                                                                                               "

## 2025-07-23 ENCOUNTER — OFFICE VISIT (OUTPATIENT)
Dept: PHYSICAL THERAPY | Facility: MEDICAL CENTER | Age: 60
End: 2025-07-23
Payer: COMMERCIAL

## 2025-07-23 DIAGNOSIS — M25.371 RIGHT ANKLE INSTABILITY: ICD-10-CM

## 2025-07-23 DIAGNOSIS — M25.571 RIGHT ANKLE PAIN, UNSPECIFIED CHRONICITY: Primary | ICD-10-CM

## 2025-07-23 PROCEDURE — 97140 MANUAL THERAPY 1/> REGIONS: CPT

## 2025-07-23 NOTE — PROGRESS NOTES
"Daily Note     Today's date: 2025  Patient name: Filipe Hill  : 1965  MRN: [id number]  Referring provider: Marcos Curry MD  Dx:   Encounter Diagnosis     ICD-10-CM    1. Right ankle pain, unspecified chronicity  M25.571       2. Right ankle instability  M25.371                      Subjective: Pt reports to PT w/significant back pain and increased stiffness, swelling and pain in his R ankle.  Pt states that he was able to play some softball and jog around the bases to which he felt fine with this and was ok the following two days.        Objective: See treatment diary below      Assessment: Tolerated treatment poorly today 2* LBP.  Patient unable to complete ex's 2* LBP.  Pt (+) for LLD R>L which was corrected w/LAD to L LE.   This brought some relief until pt was lying on side for EPAT treatment when pain in LB significantly increased again.  Pt would benefit from PT to address ankle mobility and strength.        Plan: Continue per plan of care.      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, Brostrom, peroneal tendons repair       Manuals    PROM   KO KO HK KO   Jt mobs     HK    MFR gastroc and anterior ankle joint HK KO EPAT  KO HK EPAT  D20  10Hz  1.4  bars   Flexion Man  Resistance NP NP NP     Ther Ex            Bike 10' 10' 10' 10' 10'   Step downs L3  x30 L3  x30 L3  x30 L3  x30 L3  x30   Slant str  3x30\" 3x30\" 3x30\" 3x30\" 3x30\"   AROM DF/PF IV/EV x30 x30 x30 30 NP   Ankle circles  x30 x30 x30 x30 NP   SLS AE  3x AE  3x AE  3x AE  3x NP   Standing DF 3x30 3x30 3x30 3x30 3x30   T-band DF and PF, IV and EV Blk  4x30 Blk  4x30 Blk  4x30 Blk  4x30 NP   SL LP NP NP      HR's x30 x30 x30 x30 x30   Wall slides  3x20' NP NP     Lateral walking and  Lateral side  shuffle  3x20' 3x20' 3x20' NP   Lateral lunges  NP 3x10 3x10 3x10 NP   Sled push NP #  20'x4 170#  4x20' NP   BAPS DF/PF and IV/EV " "NP NP L2  x30 L2  x30 L2  x30   Bosu DL stand 3x30\" 3x30\" 3x30\" 3x30\" NP   Seated HR's w/5# NP NP NP NP NP   Standing ankle mobility X30  Y X30  Y X30  Y Y  x30 NP   Ladders     2x NP                   Modalities 6/12 6/13 7/11 7/14     15' 15' 15' 15'                                                                                                                   "

## 2025-08-01 ENCOUNTER — OFFICE VISIT (OUTPATIENT)
Dept: PHYSICAL THERAPY | Facility: MEDICAL CENTER | Age: 60
End: 2025-08-01

## 2025-08-01 DIAGNOSIS — M25.571 RIGHT ANKLE PAIN, UNSPECIFIED CHRONICITY: Primary | ICD-10-CM

## 2025-08-01 PROCEDURE — 97140 MANUAL THERAPY 1/> REGIONS: CPT

## 2025-08-11 ENCOUNTER — OFFICE VISIT (OUTPATIENT)
Age: 60
End: 2025-08-11

## 2025-08-15 ENCOUNTER — OFFICE VISIT (OUTPATIENT)
Dept: PHYSICAL THERAPY | Facility: MEDICAL CENTER | Age: 60
End: 2025-08-15

## 2025-08-22 ENCOUNTER — OFFICE VISIT (OUTPATIENT)
Dept: PHYSICAL THERAPY | Facility: MEDICAL CENTER | Age: 60
End: 2025-08-22

## 2025-08-22 DIAGNOSIS — M25.571 RIGHT ANKLE PAIN, UNSPECIFIED CHRONICITY: Primary | ICD-10-CM

## 2025-08-22 DIAGNOSIS — M25.371 RIGHT ANKLE INSTABILITY: ICD-10-CM

## 2025-08-22 PROCEDURE — 97140 MANUAL THERAPY 1/> REGIONS: CPT
